# Patient Record
Sex: MALE | Race: WHITE | NOT HISPANIC OR LATINO | Employment: FULL TIME | URBAN - NONMETROPOLITAN AREA
[De-identification: names, ages, dates, MRNs, and addresses within clinical notes are randomized per-mention and may not be internally consistent; named-entity substitution may affect disease eponyms.]

---

## 2023-12-14 ENCOUNTER — APPOINTMENT (OUTPATIENT)
Dept: RADIOLOGY | Facility: HOSPITAL | Age: 68
End: 2023-12-14
Payer: MEDICARE

## 2023-12-14 ENCOUNTER — APPOINTMENT (OUTPATIENT)
Dept: CARDIOLOGY | Facility: HOSPITAL | Age: 68
End: 2023-12-14
Payer: MEDICARE

## 2023-12-14 ENCOUNTER — HOSPITAL ENCOUNTER (OUTPATIENT)
Facility: HOSPITAL | Age: 68
Setting detail: OBSERVATION
LOS: 1 days | Discharge: HOME | End: 2023-12-16
Attending: FAMILY MEDICINE | Admitting: NURSE PRACTITIONER
Payer: MEDICARE

## 2023-12-14 ENCOUNTER — OFFICE VISIT (OUTPATIENT)
Dept: PRIMARY CARE | Facility: CLINIC | Age: 68
End: 2023-12-14
Payer: MEDICARE

## 2023-12-14 VITALS
BODY MASS INDEX: 19.95 KG/M2 | WEIGHT: 127.4 LBS | DIASTOLIC BLOOD PRESSURE: 64 MMHG | OXYGEN SATURATION: 99 % | HEART RATE: 73 BPM | SYSTOLIC BLOOD PRESSURE: 144 MMHG

## 2023-12-14 DIAGNOSIS — I63.9 CEREBROVASCULAR ACCIDENT (CVA), UNSPECIFIED MECHANISM (MULTI): Primary | ICD-10-CM

## 2023-12-14 DIAGNOSIS — K31.819 AVM (ARTERIOVENOUS MALFORMATION) OF DUODENUM, ACQUIRED: ICD-10-CM

## 2023-12-14 DIAGNOSIS — G45.9 TIA (TRANSIENT ISCHEMIC ATTACK): Primary | ICD-10-CM

## 2023-12-14 DIAGNOSIS — N17.0 ACUTE KIDNEY INJURY (AKI) WITH ACUTE TUBULAR NECROSIS (ATN) (CMS-HCC): ICD-10-CM

## 2023-12-14 DIAGNOSIS — J44.9 CHRONIC OBSTRUCTIVE PULMONARY DISEASE, UNSPECIFIED COPD TYPE (MULTI): ICD-10-CM

## 2023-12-14 DIAGNOSIS — E78.5 HYPERLIPIDEMIA, UNSPECIFIED HYPERLIPIDEMIA TYPE: ICD-10-CM

## 2023-12-14 DIAGNOSIS — I25.10 CORONARY ARTERY DISEASE INVOLVING NATIVE CORONARY ARTERY OF NATIVE HEART WITHOUT ANGINA PECTORIS: ICD-10-CM

## 2023-12-14 DIAGNOSIS — I63.9 ACUTE EMBOLIC STROKE (MULTI): ICD-10-CM

## 2023-12-14 DIAGNOSIS — N18.30 STAGE 3 CHRONIC KIDNEY DISEASE, UNSPECIFIED WHETHER STAGE 3A OR 3B CKD (MULTI): ICD-10-CM

## 2023-12-14 DIAGNOSIS — I10 BENIGN HYPERTENSION: ICD-10-CM

## 2023-12-14 DIAGNOSIS — R00.1 BRADYCARDIA: ICD-10-CM

## 2023-12-14 DIAGNOSIS — E03.9 HYPOTHYROIDISM, UNSPECIFIED TYPE: ICD-10-CM

## 2023-12-14 DIAGNOSIS — E78.00 HYPERCHOLESTEROLEMIA: ICD-10-CM

## 2023-12-14 DIAGNOSIS — E87.1 HYPONATREMIA: ICD-10-CM

## 2023-12-14 DIAGNOSIS — F17.200 NICOTINE DEPENDENCE, UNCOMPLICATED, UNSPECIFIED NICOTINE PRODUCT TYPE: ICD-10-CM

## 2023-12-14 DIAGNOSIS — E46 PROTEIN-CALORIE MALNUTRITION, UNSPECIFIED SEVERITY (MULTI): ICD-10-CM

## 2023-12-14 PROBLEM — G45.3 AMAUROSIS FUGAX OF RIGHT EYE: Status: ACTIVE | Noted: 2023-12-14

## 2023-12-14 PROBLEM — I65.29 CAROTID ARTERY STENOSIS: Status: ACTIVE | Noted: 2023-12-14

## 2023-12-14 PROBLEM — N17.9 RENAL FAILURE, ACUTE (CMS-HCC): Status: ACTIVE | Noted: 2023-12-14

## 2023-12-14 PROBLEM — R06.09 DYSPNEA ON EXERTION: Status: ACTIVE | Noted: 2023-12-14

## 2023-12-14 PROBLEM — K55.20 AVM (ARTERIOVENOUS MALFORMATION) OF COLON: Status: ACTIVE | Noted: 2023-12-14

## 2023-12-14 PROBLEM — D50.9 IRON DEFICIENCY ANEMIA: Status: ACTIVE | Noted: 2023-12-14

## 2023-12-14 PROBLEM — R53.83 FATIGUE: Status: ACTIVE | Noted: 2023-12-14

## 2023-12-14 PROBLEM — E55.9 VITAMIN D DEFICIENCY: Status: ACTIVE | Noted: 2023-12-14

## 2023-12-14 PROBLEM — U07.1 COVID-19 VIRUS INFECTION: Status: ACTIVE | Noted: 2023-12-14

## 2023-12-14 PROBLEM — I65.23 BILATERAL CAROTID ARTERY STENOSIS: Status: ACTIVE | Noted: 2023-12-14

## 2023-12-14 PROBLEM — D64.9 ANEMIA: Status: ACTIVE | Noted: 2023-12-14

## 2023-12-14 PROBLEM — R63.4 ABNORMAL WEIGHT LOSS: Status: ACTIVE | Noted: 2023-12-14

## 2023-12-14 PROBLEM — K21.9 GASTROESOPHAGEAL REFLUX DISEASE: Status: ACTIVE | Noted: 2023-12-14

## 2023-12-14 LAB
ALBUMIN SERPL BCP-MCNC: 4.2 G/DL (ref 3.4–5)
ALP SERPL-CCNC: 41 U/L (ref 33–136)
ALT SERPL W P-5'-P-CCNC: 15 U/L (ref 10–52)
ANION GAP SERPL CALC-SCNC: 14 MMOL/L (ref 10–20)
APPEARANCE UR: CLEAR
AST SERPL W P-5'-P-CCNC: 26 U/L (ref 9–39)
ATRIAL RATE: 55 BPM
ATRIAL RATE: 56 BPM
BASOPHILS # BLD AUTO: 0.04 X10*3/UL (ref 0–0.1)
BASOPHILS NFR BLD AUTO: 0.7 %
BILIRUB SERPL-MCNC: 0.3 MG/DL (ref 0–1.2)
BILIRUB UR STRIP.AUTO-MCNC: NEGATIVE MG/DL
BUN SERPL-MCNC: 30 MG/DL (ref 6–23)
CALCIUM SERPL-MCNC: 9.2 MG/DL (ref 8.6–10.3)
CARDIAC TROPONIN I PNL SERPL HS: 12 NG/L (ref 0–20)
CARDIAC TROPONIN I PNL SERPL HS: 13 NG/L (ref 0–20)
CHLORIDE SERPL-SCNC: 93 MMOL/L (ref 98–107)
CO2 SERPL-SCNC: 23 MMOL/L (ref 21–32)
COLOR UR: YELLOW
CREAT SERPL-MCNC: 1.85 MG/DL (ref 0.5–1.3)
EOSINOPHIL # BLD AUTO: 0.09 X10*3/UL (ref 0–0.7)
EOSINOPHIL NFR BLD AUTO: 1.6 %
ERYTHROCYTE [DISTWIDTH] IN BLOOD BY AUTOMATED COUNT: 16.8 % (ref 11.5–14.5)
GFR SERPL CREATININE-BSD FRML MDRD: 39 ML/MIN/1.73M*2
GLUCOSE SERPL-MCNC: 165 MG/DL (ref 74–99)
GLUCOSE UR STRIP.AUTO-MCNC: NEGATIVE MG/DL
HCT VFR BLD AUTO: 34 % (ref 41–52)
HGB BLD-MCNC: 10.8 G/DL (ref 13.5–17.5)
HOLD SPECIMEN: NORMAL
HYALINE CASTS #/AREA URNS AUTO: ABNORMAL /LPF
IMM GRANULOCYTES # BLD AUTO: 0.02 X10*3/UL (ref 0–0.7)
IMM GRANULOCYTES NFR BLD AUTO: 0.3 % (ref 0–0.9)
KETONES UR STRIP.AUTO-MCNC: NEGATIVE MG/DL
LACTATE SERPL-SCNC: 1.7 MMOL/L (ref 0.4–2)
LEUKOCYTE ESTERASE UR QL STRIP.AUTO: NEGATIVE
LYMPHOCYTES # BLD AUTO: 0.95 X10*3/UL (ref 1.2–4.8)
LYMPHOCYTES NFR BLD AUTO: 16.4 %
MAGNESIUM SERPL-MCNC: 1.7 MG/DL (ref 1.6–2.4)
MCH RBC QN AUTO: 26.3 PG (ref 26–34)
MCHC RBC AUTO-ENTMCNC: 31.8 G/DL (ref 32–36)
MCV RBC AUTO: 83 FL (ref 80–100)
MONOCYTES # BLD AUTO: 0.27 X10*3/UL (ref 0.1–1)
MONOCYTES NFR BLD AUTO: 4.7 %
MUCOUS THREADS #/AREA URNS AUTO: ABNORMAL /LPF
NEUTROPHILS # BLD AUTO: 4.41 X10*3/UL (ref 1.2–7.7)
NEUTROPHILS NFR BLD AUTO: 76.3 %
NITRITE UR QL STRIP.AUTO: NEGATIVE
NRBC BLD-RTO: 0 /100 WBCS (ref 0–0)
P AXIS: 67 DEGREES
P AXIS: 69 DEGREES
P OFFSET: 170 MS
P OFFSET: 172 MS
P ONSET: 110 MS
P ONSET: 116 MS
PH UR STRIP.AUTO: 6 [PH]
PLATELET # BLD AUTO: 323 X10*3/UL (ref 150–450)
POTASSIUM SERPL-SCNC: 4.5 MMOL/L (ref 3.5–5.3)
PR INTERVAL: 208 MS
PR INTERVAL: 216 MS
PROT SERPL-MCNC: 7.8 G/DL (ref 6.4–8.2)
PROT UR STRIP.AUTO-MCNC: ABNORMAL MG/DL
Q ONSET: 218 MS
Q ONSET: 220 MS
QRS COUNT: 9 BEATS
QRS COUNT: 9 BEATS
QRS DURATION: 86 MS
QRS DURATION: 96 MS
QT INTERVAL: 412 MS
QT INTERVAL: 420 MS
QTC CALCULATION(BAZETT): 397 MS
QTC CALCULATION(BAZETT): 401 MS
QTC FREDERICIA: 403 MS
QTC FREDERICIA: 408 MS
R AXIS: 72 DEGREES
R AXIS: 72 DEGREES
RBC # BLD AUTO: 4.11 X10*6/UL (ref 4.5–5.9)
RBC # UR STRIP.AUTO: NEGATIVE /UL
RBC #/AREA URNS AUTO: ABNORMAL /HPF
SODIUM SERPL-SCNC: 125 MMOL/L (ref 136–145)
SP GR UR STRIP.AUTO: 1.01
T AXIS: 46 DEGREES
T AXIS: 59 DEGREES
T OFFSET: 426 MS
T OFFSET: 428 MS
UROBILINOGEN UR STRIP.AUTO-MCNC: <2 MG/DL
VENTRICULAR RATE: 55 BPM
VENTRICULAR RATE: 56 BPM
WBC # BLD AUTO: 5.8 X10*3/UL (ref 4.4–11.3)
WBC #/AREA URNS AUTO: ABNORMAL /HPF

## 2023-12-14 PROCEDURE — G0378 HOSPITAL OBSERVATION PER HR: HCPCS

## 2023-12-14 PROCEDURE — 2500000001 HC RX 250 WO HCPCS SELF ADMINISTERED DRUGS (ALT 637 FOR MEDICARE OP): Performed by: NURSE PRACTITIONER

## 2023-12-14 PROCEDURE — 93005 ELECTROCARDIOGRAM TRACING: CPT

## 2023-12-14 PROCEDURE — 84484 ASSAY OF TROPONIN QUANT: CPT | Performed by: FAMILY MEDICINE

## 2023-12-14 PROCEDURE — 93000 ELECTROCARDIOGRAM COMPLETE: CPT | Performed by: INTERNAL MEDICINE

## 2023-12-14 PROCEDURE — 3078F DIAST BP <80 MM HG: CPT | Performed by: INTERNAL MEDICINE

## 2023-12-14 PROCEDURE — 3077F SYST BP >= 140 MM HG: CPT | Performed by: INTERNAL MEDICINE

## 2023-12-14 PROCEDURE — 96360 HYDRATION IV INFUSION INIT: CPT | Performed by: NURSE PRACTITIONER

## 2023-12-14 PROCEDURE — 80053 COMPREHEN METABOLIC PANEL: CPT | Performed by: FAMILY MEDICINE

## 2023-12-14 PROCEDURE — 83605 ASSAY OF LACTIC ACID: CPT | Performed by: FAMILY MEDICINE

## 2023-12-14 PROCEDURE — 99285 EMERGENCY DEPT VISIT HI MDM: CPT | Performed by: FAMILY MEDICINE

## 2023-12-14 PROCEDURE — 85025 COMPLETE CBC W/AUTO DIFF WBC: CPT | Performed by: FAMILY MEDICINE

## 2023-12-14 PROCEDURE — 99214 OFFICE O/P EST MOD 30 MIN: CPT | Performed by: INTERNAL MEDICINE

## 2023-12-14 PROCEDURE — 70450 CT HEAD/BRAIN W/O DYE: CPT

## 2023-12-14 PROCEDURE — 71046 X-RAY EXAM CHEST 2 VIEWS: CPT | Mod: FY

## 2023-12-14 PROCEDURE — 94762 N-INVAS EAR/PLS OXIMTRY CONT: CPT

## 2023-12-14 PROCEDURE — 2500000004 HC RX 250 GENERAL PHARMACY W/ HCPCS (ALT 636 FOR OP/ED): Performed by: FAMILY MEDICINE

## 2023-12-14 PROCEDURE — 1159F MED LIST DOCD IN RCRD: CPT | Performed by: INTERNAL MEDICINE

## 2023-12-14 PROCEDURE — 1126F AMNT PAIN NOTED NONE PRSNT: CPT | Performed by: INTERNAL MEDICINE

## 2023-12-14 PROCEDURE — 1160F RVW MEDS BY RX/DR IN RCRD: CPT | Performed by: INTERNAL MEDICINE

## 2023-12-14 PROCEDURE — 83735 ASSAY OF MAGNESIUM: CPT | Performed by: FAMILY MEDICINE

## 2023-12-14 PROCEDURE — 81003 URINALYSIS AUTO W/O SCOPE: CPT | Performed by: FAMILY MEDICINE

## 2023-12-14 PROCEDURE — 4004F PT TOBACCO SCREEN RCVD TLK: CPT | Performed by: INTERNAL MEDICINE

## 2023-12-14 PROCEDURE — 96361 HYDRATE IV INFUSION ADD-ON: CPT | Performed by: NURSE PRACTITIONER

## 2023-12-14 PROCEDURE — 71046 X-RAY EXAM CHEST 2 VIEWS: CPT | Performed by: RADIOLOGY

## 2023-12-14 PROCEDURE — 36415 COLL VENOUS BLD VENIPUNCTURE: CPT | Performed by: FAMILY MEDICINE

## 2023-12-14 PROCEDURE — 70450 CT HEAD/BRAIN W/O DYE: CPT | Performed by: RADIOLOGY

## 2023-12-14 RX ORDER — ONDANSETRON HYDROCHLORIDE 2 MG/ML
4 INJECTION, SOLUTION INTRAVENOUS EVERY 8 HOURS PRN
Status: DISCONTINUED | OUTPATIENT
Start: 2023-12-14 | End: 2023-12-16 | Stop reason: HOSPADM

## 2023-12-14 RX ORDER — ACETAMINOPHEN 160 MG/5ML
650 SOLUTION ORAL EVERY 4 HOURS PRN
Status: DISCONTINUED | OUTPATIENT
Start: 2023-12-14 | End: 2023-12-16 | Stop reason: HOSPADM

## 2023-12-14 RX ORDER — ACETAMINOPHEN 325 MG/1
650 TABLET ORAL EVERY 4 HOURS PRN
Status: DISCONTINUED | OUTPATIENT
Start: 2023-12-14 | End: 2023-12-16 | Stop reason: HOSPADM

## 2023-12-14 RX ORDER — PANTOPRAZOLE SODIUM 40 MG/10ML
40 INJECTION, POWDER, LYOPHILIZED, FOR SOLUTION INTRAVENOUS
Status: DISCONTINUED | OUTPATIENT
Start: 2023-12-15 | End: 2023-12-16 | Stop reason: HOSPADM

## 2023-12-14 RX ORDER — LISINOPRIL AND HYDROCHLOROTHIAZIDE 12.5; 2 MG/1; MG/1
1 TABLET ORAL DAILY
Status: DISCONTINUED | OUTPATIENT
Start: 2023-12-14 | End: 2023-12-15

## 2023-12-14 RX ORDER — AMLODIPINE BESYLATE 5 MG/1
5 TABLET ORAL DAILY
COMMUNITY

## 2023-12-14 RX ORDER — AMLODIPINE BESYLATE 5 MG/1
5 TABLET ORAL DAILY
Status: DISCONTINUED | OUTPATIENT
Start: 2023-12-14 | End: 2023-12-16 | Stop reason: HOSPADM

## 2023-12-14 RX ORDER — LISINOPRIL AND HYDROCHLOROTHIAZIDE 12.5; 2 MG/1; MG/1
1 TABLET ORAL DAILY
COMMUNITY

## 2023-12-14 RX ORDER — ENOXAPARIN SODIUM 100 MG/ML
40 INJECTION SUBCUTANEOUS EVERY 24 HOURS
Status: DISCONTINUED | OUTPATIENT
Start: 2023-12-14 | End: 2023-12-14

## 2023-12-14 RX ORDER — ACETAMINOPHEN 650 MG/1
650 SUPPOSITORY RECTAL EVERY 4 HOURS PRN
Status: DISCONTINUED | OUTPATIENT
Start: 2023-12-14 | End: 2023-12-16 | Stop reason: HOSPADM

## 2023-12-14 RX ORDER — ONDANSETRON 4 MG/1
4 TABLET, FILM COATED ORAL EVERY 8 HOURS PRN
Status: DISCONTINUED | OUTPATIENT
Start: 2023-12-14 | End: 2023-12-16 | Stop reason: HOSPADM

## 2023-12-14 RX ORDER — ASPIRIN 81 MG/1
81 TABLET ORAL DAILY
Status: DISCONTINUED | OUTPATIENT
Start: 2023-12-14 | End: 2023-12-16 | Stop reason: HOSPADM

## 2023-12-14 RX ORDER — ATORVASTATIN CALCIUM 40 MG/1
40 TABLET, FILM COATED ORAL NIGHTLY
Status: DISCONTINUED | OUTPATIENT
Start: 2023-12-14 | End: 2023-12-16 | Stop reason: HOSPADM

## 2023-12-14 RX ORDER — PANTOPRAZOLE SODIUM 40 MG/1
40 TABLET, DELAYED RELEASE ORAL
Status: DISCONTINUED | OUTPATIENT
Start: 2023-12-15 | End: 2023-12-16 | Stop reason: HOSPADM

## 2023-12-14 RX ORDER — ENOXAPARIN SODIUM 100 MG/ML
30 INJECTION SUBCUTANEOUS EVERY 24 HOURS
Status: DISCONTINUED | OUTPATIENT
Start: 2023-12-14 | End: 2023-12-16 | Stop reason: HOSPADM

## 2023-12-14 RX ORDER — CETIRIZINE HYDROCHLORIDE 10 MG/1
5 TABLET ORAL DAILY
COMMUNITY

## 2023-12-14 RX ORDER — POLYETHYLENE GLYCOL 3350 17 G/17G
17 POWDER, FOR SOLUTION ORAL DAILY PRN
Status: DISCONTINUED | OUTPATIENT
Start: 2023-12-14 | End: 2023-12-16 | Stop reason: HOSPADM

## 2023-12-14 RX ADMIN — SODIUM CHLORIDE 500 ML: 9 INJECTION, SOLUTION INTRAVENOUS at 14:05

## 2023-12-14 RX ADMIN — ASPIRIN 81 MG: 81 TABLET, COATED ORAL at 21:09

## 2023-12-14 RX ADMIN — ATORVASTATIN CALCIUM 40 MG: 40 TABLET, FILM COATED ORAL at 21:09

## 2023-12-14 SDOH — SOCIAL STABILITY: SOCIAL INSECURITY: ARE THERE ANY APPARENT SIGNS OF INJURIES/BEHAVIORS THAT COULD BE RELATED TO ABUSE/NEGLECT?: NO

## 2023-12-14 SDOH — SOCIAL STABILITY: SOCIAL INSECURITY: WERE YOU ABLE TO COMPLETE ALL THE BEHAVIORAL HEALTH SCREENINGS?: YES

## 2023-12-14 SDOH — SOCIAL STABILITY: SOCIAL INSECURITY: ARE YOU OR HAVE YOU BEEN THREATENED OR ABUSED PHYSICALLY, EMOTIONALLY, OR SEXUALLY BY ANYONE?: NO

## 2023-12-14 SDOH — SOCIAL STABILITY: SOCIAL INSECURITY: HAS ANYONE EVER THREATENED TO HURT YOUR FAMILY OR YOUR PETS?: NO

## 2023-12-14 SDOH — SOCIAL STABILITY: SOCIAL INSECURITY: DO YOU FEEL ANYONE HAS EXPLOITED OR TAKEN ADVANTAGE OF YOU FINANCIALLY OR OF YOUR PERSONAL PROPERTY?: NO

## 2023-12-14 SDOH — SOCIAL STABILITY: SOCIAL INSECURITY: DO YOU FEEL UNSAFE GOING BACK TO THE PLACE WHERE YOU ARE LIVING?: NO

## 2023-12-14 SDOH — SOCIAL STABILITY: SOCIAL INSECURITY: ABUSE: ADULT

## 2023-12-14 SDOH — SOCIAL STABILITY: SOCIAL INSECURITY: HAVE YOU HAD THOUGHTS OF HARMING ANYONE ELSE?: NO

## 2023-12-14 SDOH — SOCIAL STABILITY: SOCIAL INSECURITY: DOES ANYONE TRY TO KEEP YOU FROM HAVING/CONTACTING OTHER FRIENDS OR DOING THINGS OUTSIDE YOUR HOME?: NO

## 2023-12-14 ASSESSMENT — COLUMBIA-SUICIDE SEVERITY RATING SCALE - C-SSRS
1. IN THE PAST MONTH, HAVE YOU WISHED YOU WERE DEAD OR WISHED YOU COULD GO TO SLEEP AND NOT WAKE UP?: NO
2. HAVE YOU ACTUALLY HAD ANY THOUGHTS OF KILLING YOURSELF?: NO
6. HAVE YOU EVER DONE ANYTHING, STARTED TO DO ANYTHING, OR PREPARED TO DO ANYTHING TO END YOUR LIFE?: NO

## 2023-12-14 ASSESSMENT — ACTIVITIES OF DAILY LIVING (ADL)
PATIENT'S MEMORY ADEQUATE TO SAFELY COMPLETE DAILY ACTIVITIES?: YES
FEEDING YOURSELF: INDEPENDENT
DRESSING YOURSELF: INDEPENDENT
HEARING - RIGHT EAR: FUNCTIONAL
GROOMING: INDEPENDENT
TOILETING: INDEPENDENT
HEARING - LEFT EAR: FUNCTIONAL
ADEQUATE_TO_COMPLETE_ADL: YES
LACK_OF_TRANSPORTATION: NO
JUDGMENT_ADEQUATE_SAFELY_COMPLETE_DAILY_ACTIVITIES: YES
WALKS IN HOME: INDEPENDENT
BATHING: INDEPENDENT
ASSISTIVE_DEVICE: EYEGLASSES

## 2023-12-14 ASSESSMENT — PAIN SCALES - GENERAL
PAINLEVEL_OUTOF10: 0 - NO PAIN

## 2023-12-14 ASSESSMENT — LIFESTYLE VARIABLES
AUDIT-C TOTAL SCORE: 6
HOW OFTEN DO YOU HAVE A DRINK CONTAINING ALCOHOL: 4 OR MORE TIMES A WEEK
AUDIT-C TOTAL SCORE: 6
HOW MANY STANDARD DRINKS CONTAINING ALCOHOL DO YOU HAVE ON A TYPICAL DAY: 3 OR 4
SKIP TO QUESTIONS 9-10: 0
HOW OFTEN DO YOU HAVE 6 OR MORE DRINKS ON ONE OCCASION: LESS THAN MONTHLY

## 2023-12-14 ASSESSMENT — COGNITIVE AND FUNCTIONAL STATUS - GENERAL
MOBILITY SCORE: 24
PATIENT BASELINE BEDBOUND: NO
DAILY ACTIVITIY SCORE: 24

## 2023-12-14 ASSESSMENT — PAIN - FUNCTIONAL ASSESSMENT
PAIN_FUNCTIONAL_ASSESSMENT: 0-10
PAIN_FUNCTIONAL_ASSESSMENT: 0-10

## 2023-12-14 ASSESSMENT — PATIENT HEALTH QUESTIONNAIRE - PHQ9
1. LITTLE INTEREST OR PLEASURE IN DOING THINGS: NOT AT ALL
2. FEELING DOWN, DEPRESSED OR HOPELESS: NOT AT ALL
SUM OF ALL RESPONSES TO PHQ9 QUESTIONS 1 & 2: 0

## 2023-12-14 ASSESSMENT — ENCOUNTER SYMPTOMS
NUMBNESS: 1
EXTREMITY WEAKNESS: 1

## 2023-12-14 NOTE — PROGRESS NOTES
Subjective   Patient ID: Denys Denton is a 68 y.o. male who presents for Extremity Weakness (Right side foot, could not lift foot/Right arm and hand-having a hard time writing/Started on Monday 12/11/23 at 7pm/) and Numbness (Right arm ).  Extremity Weakness  Associated symptoms include numbness.   Neuropathy          Same day sick        Last seen 11-22        Patient noticed 12/11/2023 sudden onset right lower extremity and   right   upper extremity weakness and tingling.  Also denied speech difficulties.  He denied any chest pain dyspnea and cough.  Patient with a history of TIAs.     hypothyroidism on rx no side effects     s/p symptomatic anemia 2ry to AVM bleed  no bleeding seen  feels much better  was using a lot of NSAID's for arthritic pains     GERD stable on rx no side effects     CAD stable on rx no side effects     hypertension on rx no side effects     hypercholesterolemia on rx no side effects     h/o strokes 2013     nicotine cessation discussed     diet / exercise rev'd     follow up cardio / Dr Chapin     follow weights    Review of Systems   Musculoskeletal:  Positive for extremity weakness.   Neurological:  Positive for numbness.   All other systems reviewed and are negative.      Objective   /64   Pulse 73   Wt 57.8 kg (127 lb 6.4 oz)   SpO2 99%   BMI 19.95 kg/m²   Lab Results   Component Value Date    WBC 5.8 12/14/2023    HGB 10.8 (L) 12/14/2023    HCT 34.0 (L) 12/14/2023     12/14/2023    CHOL 174 02/21/2022    TRIG 60 02/21/2022    HDL 72.0 02/21/2022    ALT 15 12/14/2023    AST 26 12/14/2023     (L) 12/14/2023    K 4.5 12/14/2023    CL 93 (L) 12/14/2023    CREATININE 1.85 (H) 12/14/2023    BUN 30 (H) 12/14/2023    CO2 23 12/14/2023    TSH 11.71 (H) 08/30/2022    INR 0.9 07/07/2022    HGBA1C 5.4 11/22/2022           Physical Exam  Vitals reviewed.   Constitutional:       Appearance: Normal appearance. He is normal weight.   HENT:      Head: Normocephalic and  atraumatic.      Mouth/Throat:      Pharynx: No posterior oropharyngeal erythema.   Eyes:      General: No scleral icterus.     Conjunctiva/sclera: Conjunctivae normal.      Pupils: Pupils are equal, round, and reactive to light.   Cardiovascular:      Rate and Rhythm: Normal rate and regular rhythm.      Heart sounds: Normal heart sounds.   Pulmonary:      Effort: No respiratory distress.      Breath sounds: No wheezing.   Abdominal:      General: Abdomen is flat. Bowel sounds are normal. There is no distension.      Palpations: Abdomen is soft. There is no mass.      Tenderness: There is no abdominal tenderness. There is no rebound.   Musculoskeletal:         General: Normal range of motion.      Cervical back: Normal range of motion and neck supple.   Skin:     General: Skin is warm and dry.   Neurological:      General: No focal deficit present.      Mental Status: He is alert and oriented to person, place, and time. Mental status is at baseline.      Motor: Weakness present.      Gait: Gait abnormal.      Comments: RUE weak   Normal speech  DTR's normal / =   Psychiatric:         Mood and Affect: Mood normal.         Behavior: Behavior normal.         Thought Content: Thought content normal.         Judgment: Judgment normal.         Problem List Items Addressed This Visit             ICD-10-CM       Cardiac and Vasculature    Benign hypertension I10    CAD (coronary artery disease) I25.10    Relevant Medications    amLODIPine (Norvasc) 5 mg tablet    Hypercholesterolemia E78.00    Relevant Orders    ECG 12 Lead (Completed)       Endocrine/Metabolic    Hypothyroidism E03.9    Protein-calorie malnutrition, unspecified severity (CMS/HCC) E46       Gastrointestinal and Abdominal    AVM (arteriovenous malformation) of duodenum, acquired K31.819       Genitourinary and Reproductive    Stage 3 chronic kidney disease, unspecified whether stage 3a or 3b CKD (CMS/HCC) N18.30       Pulmonary and Pneumonias    Chronic  obstructive pulmonary disease, unspecified COPD type (CMS/[facility]) J44.9     Other Visit Diagnoses         Codes    Cerebrovascular accident (CVA), unspecified mechanism (CMS/[facility])    -  Primary I63.9    Nicotine dependence, uncomplicated, unspecified nicotine product type     F17.200          Assessment/Plan       Same day sick        Last seen 11-22        Patient noticed 12/11/2023 sudden onset right lower extremity and   right   upper extremity weakness and tingling.  Also denied speech difficulties.  He denied any chest pain dyspnea and cough.  Patient with a history of TIAs / strokes  EKG ok  To ER ASAP  D/W ER team need for admission for further workup     hypothyroidism on rx no side effects     s/p symptomatic anemia 2ry to AVM bleed  no bleeding seen  feels much better  was using a lot of NSAID's for arthritic pains     GERD stable on rx no side effects     CAD stable on rx no side effects     hypertension on rx no side effects     hypercholesterolemia on rx no side effects     h/o strokes 2013     nicotine cessation discussed     diet / exercise rev'd     follow up cardio / Dr Chapin     follow weights    Follow up pending

## 2023-12-14 NOTE — ED PROVIDER NOTES
"HPI   Chief Complaint   Patient presents with    Weakness, Gen     Right sided weakness that began on Monday; history of \"a couple of strokes,\" reports calling Dr. Queenie Garza this morning who recommended ED visit.       HPI  This 68-year-old male patient came to the emergency room with complaint of onset of weakness in right arm or right leg on Monday, 72 hours before reported emergency room patient stated that she did not fully recuperate but  he is able to walk better still has some dragging of the leg and for the stated that he has dexterity problem weak as he cannot write even though he has good handgrip and strength has improved in the arm too.  He denies loss of consciousness blackout or pass out chest pain or short of breath abdominal pain vomiting or diarrhea.  Denies prior history of stroke recent stroke on Monday.  He denies any headache and dizziness lightheadedness.  Patient not using a blood thinner.  Denies trauma fall injury.  He does smoke.  He denies history of close problem admitted history of hypertension.       PHYSICAL EXAM    CONSTITUTIONAL: Patient is awake alert pleasant cooperative.  She is drooping or drooling no stridor.  Slight weakness in right hand when he tried to read and write right since but he has good handgrip and strength is 5/5 in the right and right leg when he walks he has slightNo for asymmetrical mood but strength is 5/5 in right arm and right leg.  Actually both arms both legs.  No facial drooping or drooling no stridor neck was supple awake alert oriented x 3.    HENMT: The airway was intact. There was no ear or nose discharge. No drooling or stridor. Neck was supple. Talking and breathing comfortably.  EYES: Clear bilaterally, pupils equal, round and reactive to light. CARDIOVASCULAR: Regular rate and rhythm. No friction rub or murmur good peripheral pulses no peripheral edema. Nontender Homans sign negative. RESPIRATORY: Patient was breathing comfortably. No " tachypnea no respiratory distress.  On auscultation he has diminished breath sounds crackles in the lung bases.  However has good skin perfusion. GASTROINTESTINAL: Abdomen soft positive bowel sounds noted right lower quadrant tenderness no guarding, rebound surgery no CVA tenderness 20 no pulsatile mass.   GENITOURINARY:  No costovertebral angle tenderness. MUSCULOSKELETAL: Head was normocephalic atraumatic cervical thoracic lumbar spine nontender.  Chest was nontender  Both upper extremity good motion nontender intact distal pulse intact sensation. NEUROLOGICAL: No facial drooping or drooling symmetrical strength both upper lower extremity strength 5/5 intact sensation.  When he walks he think he has slight dragging of leg on the right.  Dorsiflexion plantar foot and toes.  He also complains of dexterity problems and unable to write and seems to drop things but handgrip is symmetrical bilaterally.      SKIN: Skin normal color for race, warm, dry and intact. No evidence of trauma or lesions. PSYCHIATRIC: Awake alert and without acute distress. No obvious depression, no suicidal thoughts or ideation. Appropriate mood. Talking and normal tone. HEME/LYMPH: No adenopathy or splenomegaly.        CRITICAL CARE: Patient required 15 minutes of critical care in the ER    VITAL SIGNS:            DISPOSITION                      No data recorded                Patient History   Past Medical History:   Diagnosis Date    Personal history of other diseases of the circulatory system     History of hypertension    Personal history of other specified conditions 08/30/2022    History of chest pain    Personal history of transient ischemic attack (TIA), and cerebral infarction without residual deficits 05/05/2021    History of cerebrovascular accident    Pure hypercholesterolemia, unspecified 08/30/2022    Hypercholesterolemia     Past Surgical History:   Procedure Laterality Date    COLOSTOMY  05/08/2014    Colostomy    KNEE SURGERY   05/08/2014    Knee Surgery    OTHER SURGICAL HISTORY  05/05/2021    Carpal tunnel surgery    OTHER SURGICAL HISTORY  05/05/2021    Sinus surgery    OTHER SURGICAL HISTORY  06/23/2021    Complete colonoscopy    OTHER SURGICAL HISTORY  06/17/2014    Carotid Artery Catheterization     No family history on file.  Social History     Tobacco Use    Smoking status: Every Day     Types: Cigarettes    Smokeless tobacco: Never   Substance Use Topics    Alcohol use: Not on file    Drug use: Not on file       Physical Exam   ED Triage Vitals [12/14/23 1237]   Temp Heart Rate Resp BP   36.3 °C (97.4 °F) 65 15 174/78      SpO2 Temp Source Heart Rate Source Patient Position   100 % Temporal -- --      BP Location FiO2 (%)     -- --       Physical Exam    ED Course & MDM   Diagnoses as of 01/10/24 0633   Acute embolic stroke (CMS/HCC)   Hyponatremia   Acute kidney injury ([facility]) with acute tubular necrosis (ATN) (CMS/HCC)   Bradycardia   EKG obtained at 1421 hrs. showed sinus bradycardia rate of 56.  Tall T waves, no ST-T wave elevation no STEMI.  When compared with the prior EKG on July 7, 2023 no new changes noted.  No STEMI.  I read this EKG.    Second EKG done at 1550 hrs. again showed sinus bradycardia with first-degree AV block on this EKG, tall T waves.  No ST-T evaluation.  No STEMI.  Abnormal EKG CO at this EKG.    Medical Decision Making  This 68-year-old male patient came to the emergency room with complaint of onset of weakness in right arm or right leg on Monday, 72 hours before he reported emergency room.  Patient stated that she did not fully recuperate but  he is able to walk better still has some dragging of the leg and for the stated that he has dexterity problem weak as he cannot write even though he has good handgrip and strength has improved in the arm too.  He denies loss of consciousness blackout or pass out chest pain or short of breath abdominal pain vomiting or diarrhea.  Denies prior history of stroke  recent stroke on Monday.  He denies any headache and dizziness lightheadedness.  Patient not using a blood thinner.  Denies trauma fall injury.  He does smoke.  He denies history of close problem admitted history of hypertension.    Upon examination patient is awake and alert oriented x 3.  No facial drooping or drooling no stridor.  No facial asymmetry.  Neck is supple lungs are clear heart regular rate and rhythm lungs auscultated abdomen soft and nontender no pulsatile mass noted.  No facial drooping or drooling symmetrical strength both upper lower extremity strength 5/5 intact sensation.  When he walks he think he has slight dragging of leg on the right.  Dorsiflexion plantar foot and toes.  He also complains of dexterity problems and unable to write and seems to drop things but handgrip is symmetrical bilaterally.    Patient reported emergency room almost 4 days after onset of symptoms he is having recovery of some of the stroke symptoms in the right arm right leg weakness.  His CAT scan however showed encephalomalacia but no definite acute stroke see CT report.    Case discussed with Dr. Blanquita MELÉNDEZ MD's stroke attending at [facility] agree patient does not qualify for thrombolysis thrombolytic treatment she advised patient to be admitted locally and they are doing virtual consult after patient workup done for stroke as an inpatient.  Patient was given aspirin and subsequently admitted to the floor.  Patient labs did show hyponatremia rest of the chemistry essentially unremarkable except BUN was 30 and creatinine 1.65.  Patient troponin negative.  Chest x-ray was negative.  EKG showed bradycardia, no ST-T wave elevation.  No STEMI.  Patient required 15 minutes of critical care in the ER:       Procedure  Procedures     Fady Yeung MD  01/10/24 [id number]       Fady Yeung MD  01/10/24 [id number]

## 2023-12-15 ENCOUNTER — APPOINTMENT (OUTPATIENT)
Dept: CARDIOLOGY | Facility: HOSPITAL | Age: 68
End: 2023-12-15
Payer: MEDICARE

## 2023-12-15 ENCOUNTER — APPOINTMENT (OUTPATIENT)
Dept: RADIOLOGY | Facility: HOSPITAL | Age: 68
End: 2023-12-15
Payer: MEDICARE

## 2023-12-15 LAB
ALBUMIN SERPL BCP-MCNC: 4 G/DL (ref 3.4–5)
ALP SERPL-CCNC: 33 U/L (ref 33–136)
ALT SERPL W P-5'-P-CCNC: 10 U/L (ref 10–52)
ANION GAP SERPL CALC-SCNC: 13 MMOL/L (ref 10–20)
AORTIC VALVE PEAK VELOCITY: 1.34
AST SERPL W P-5'-P-CCNC: 19 U/L (ref 9–39)
AV PEAK GRADIENT: 7.2
AVA (PEAK VEL): 2.79
BILIRUB DIRECT SERPL-MCNC: 0 MG/DL (ref 0–0.3)
BILIRUB SERPL-MCNC: 0.3 MG/DL (ref 0–1.2)
BNP SERPL-MCNC: 94 PG/ML (ref 0–99)
BUN SERPL-MCNC: 31 MG/DL (ref 6–23)
CALCIUM SERPL-MCNC: 9.1 MG/DL (ref 8.6–10.3)
CHLORIDE SERPL-SCNC: 100 MMOL/L (ref 98–107)
CHOLEST SERPL-MCNC: 156 MG/DL (ref 0–199)
CHOLESTEROL/HDL RATIO: 2.7
CO2 SERPL-SCNC: 21 MMOL/L (ref 21–32)
CREAT SERPL-MCNC: 1.04 MG/DL (ref 0.5–1.3)
EJECTION FRACTION APICAL 4 CHAMBER: 70.9
EJECTION FRACTION: 72
ERYTHROCYTE [DISTWIDTH] IN BLOOD BY AUTOMATED COUNT: 16.9 % (ref 11.5–14.5)
EST. AVERAGE GLUCOSE BLD GHB EST-MCNC: 108 MG/DL
GFR SERPL CREATININE-BSD FRML MDRD: 77 ML/MIN/1.73M*2
GLUCOSE SERPL-MCNC: 98 MG/DL (ref 74–99)
HBA1C MFR BLD: 5.4 %
HCT VFR BLD AUTO: 33 % (ref 41–52)
HDLC SERPL-MCNC: 58 MG/DL
HGB BLD-MCNC: 10.4 G/DL (ref 13.5–17.5)
LDLC SERPL CALC-MCNC: 88 MG/DL
LEFT ATRIUM VOLUME AREA LENGTH INDEX BSA: 28.2
LEFT VENTRICLE INTERNAL DIMENSION DIASTOLE: 4.27 (ref 3.5–6)
LEFT VENTRICULAR OUTFLOW TRACT DIAMETER: 2.1
MCH RBC QN AUTO: 26.1 PG (ref 26–34)
MCHC RBC AUTO-ENTMCNC: 31.5 G/DL (ref 32–36)
MCV RBC AUTO: 83 FL (ref 80–100)
MITRAL VALVE E/A RATIO: 0.77
MITRAL VALVE E/E' RATIO: 7.39
NON HDL CHOLESTEROL: 98 MG/DL (ref 0–149)
NRBC BLD-RTO: 0 /100 WBCS (ref 0–0)
PLATELET # BLD AUTO: 287 X10*3/UL (ref 150–450)
POTASSIUM SERPL-SCNC: 4.1 MMOL/L (ref 3.5–5.3)
PROT SERPL-MCNC: 7.1 G/DL (ref 6.4–8.2)
RBC # BLD AUTO: 3.98 X10*6/UL (ref 4.5–5.9)
RIGHT VENTRICLE FREE WALL PEAK S': 12.8
RIGHT VENTRICLE PEAK SYSTOLIC PRESSURE: 21.8
SODIUM SERPL-SCNC: 130 MMOL/L (ref 136–145)
TRICUSPID ANNULAR PLANE SYSTOLIC EXCURSION: 1.9
TRIGL SERPL-MCNC: 51 MG/DL (ref 0–149)
VLDL: 10 MG/DL (ref 0–40)
WBC # BLD AUTO: 5.2 X10*3/UL (ref 4.4–11.3)

## 2023-12-15 PROCEDURE — 36415 COLL VENOUS BLD VENIPUNCTURE: CPT | Performed by: NURSE PRACTITIONER

## 2023-12-15 PROCEDURE — 83036 HEMOGLOBIN GLYCOSYLATED A1C: CPT | Mod: GENLAB | Performed by: NURSE PRACTITIONER

## 2023-12-15 PROCEDURE — 80053 COMPREHEN METABOLIC PANEL: CPT

## 2023-12-15 PROCEDURE — 70547 MR ANGIOGRAPHY NECK W/O DYE: CPT

## 2023-12-15 PROCEDURE — 80061 LIPID PANEL: CPT | Performed by: NURSE PRACTITIONER

## 2023-12-15 PROCEDURE — 96361 HYDRATE IV INFUSION ADD-ON: CPT | Performed by: NURSE PRACTITIONER

## 2023-12-15 PROCEDURE — 70551 MRI BRAIN STEM W/O DYE: CPT

## 2023-12-15 PROCEDURE — 99222 1ST HOSP IP/OBS MODERATE 55: CPT | Performed by: NURSE PRACTITIONER

## 2023-12-15 PROCEDURE — 99233 SBSQ HOSP IP/OBS HIGH 50: CPT | Performed by: PSYCHIATRY & NEUROLOGY

## 2023-12-15 PROCEDURE — 85027 COMPLETE CBC AUTOMATED: CPT | Performed by: NURSE PRACTITIONER

## 2023-12-15 PROCEDURE — 83880 ASSAY OF NATRIURETIC PEPTIDE: CPT | Performed by: NURSE PRACTITIONER

## 2023-12-15 PROCEDURE — 36415 COLL VENOUS BLD VENIPUNCTURE: CPT

## 2023-12-15 PROCEDURE — 70551 MRI BRAIN STEM W/O DYE: CPT | Performed by: RADIOLOGY

## 2023-12-15 PROCEDURE — 93306 TTE W/DOPPLER COMPLETE: CPT

## 2023-12-15 PROCEDURE — G0378 HOSPITAL OBSERVATION PER HR: HCPCS

## 2023-12-15 PROCEDURE — 82248 BILIRUBIN DIRECT: CPT | Performed by: NURSE PRACTITIONER

## 2023-12-15 PROCEDURE — 2500000001 HC RX 250 WO HCPCS SELF ADMINISTERED DRUGS (ALT 637 FOR MEDICARE OP)

## 2023-12-15 PROCEDURE — 70547 MR ANGIOGRAPHY NECK W/O DYE: CPT | Performed by: RADIOLOGY

## 2023-12-15 PROCEDURE — 70544 MR ANGIOGRAPHY HEAD W/O DYE: CPT | Performed by: RADIOLOGY

## 2023-12-15 PROCEDURE — 70544 MR ANGIOGRAPHY HEAD W/O DYE: CPT | Mod: 59

## 2023-12-15 PROCEDURE — 2500000004 HC RX 250 GENERAL PHARMACY W/ HCPCS (ALT 636 FOR OP/ED): Mod: MUE

## 2023-12-15 RX ORDER — LISINOPRIL 20 MG/1
20 TABLET ORAL DAILY
Status: DISCONTINUED | OUTPATIENT
Start: 2023-12-15 | End: 2023-12-16 | Stop reason: HOSPADM

## 2023-12-15 RX ORDER — HYDROCHLOROTHIAZIDE 12.5 MG/1
12.5 TABLET ORAL DAILY
Status: DISCONTINUED | OUTPATIENT
Start: 2023-12-15 | End: 2023-12-16 | Stop reason: HOSPADM

## 2023-12-15 RX ORDER — SODIUM CHLORIDE 9 MG/ML
75 INJECTION, SOLUTION INTRAVENOUS CONTINUOUS
Status: DISCONTINUED | OUTPATIENT
Start: 2023-12-15 | End: 2023-12-16 | Stop reason: HOSPADM

## 2023-12-15 RX ADMIN — SODIUM CHLORIDE 75 ML/HR: 9 INJECTION, SOLUTION INTRAVENOUS at 13:40

## 2023-12-15 RX ADMIN — LISINOPRIL 20 MG: 20 TABLET ORAL at 08:36

## 2023-12-15 RX ADMIN — HYDROCHLOROTHIAZIDE 12.5 MG: 12.5 TABLET ORAL at 08:36

## 2023-12-15 RX ADMIN — AMLODIPINE BESYLATE 5 MG: 5 TABLET ORAL at 08:36

## 2023-12-15 RX ADMIN — PANTOPRAZOLE SODIUM 40 MG: 40 TABLET, DELAYED RELEASE ORAL at 08:36

## 2023-12-15 RX ADMIN — ASPIRIN 81 MG: 81 TABLET, COATED ORAL at 08:36

## 2023-12-15 RX ADMIN — ATORVASTATIN CALCIUM 40 MG: 40 TABLET, FILM COATED ORAL at 21:54

## 2023-12-15 SDOH — ECONOMIC STABILITY: HOUSING INSECURITY: IN THE LAST 12 MONTHS, HOW MANY PLACES HAVE YOU LIVED?: 1

## 2023-12-15 SDOH — SOCIAL STABILITY: SOCIAL NETWORK: ARE YOU MARRIED, WIDOWED, DIVORCED, SEPARATED, NEVER MARRIED, OR LIVING WITH A PARTNER?: MARRIED

## 2023-12-15 SDOH — ECONOMIC STABILITY: HOUSING INSECURITY
IN THE LAST 12 MONTHS, WAS THERE A TIME WHEN YOU DID NOT HAVE A STEADY PLACE TO SLEEP OR SLEPT IN A SHELTER (INCLUDING NOW)?: NO

## 2023-12-15 SDOH — SOCIAL STABILITY: SOCIAL INSECURITY
WITHIN THE LAST YEAR, HAVE TO BEEN RAPED OR FORCED TO HAVE ANY KIND OF SEXUAL ACTIVITY BY YOUR PARTNER OR EX-PARTNER?: NO

## 2023-12-15 SDOH — ECONOMIC STABILITY: FOOD INSECURITY: WITHIN THE PAST 12 MONTHS, YOU WORRIED THAT YOUR FOOD WOULD RUN OUT BEFORE YOU GOT MONEY TO BUY MORE.: NEVER TRUE

## 2023-12-15 SDOH — SOCIAL STABILITY: SOCIAL INSECURITY: WITHIN THE LAST YEAR, HAVE YOU BEEN HUMILIATED OR EMOTIONALLY ABUSED IN OTHER WAYS BY YOUR PARTNER OR EX-PARTNER?: NO

## 2023-12-15 SDOH — ECONOMIC STABILITY: FOOD INSECURITY: WITHIN THE PAST 12 MONTHS, THE FOOD YOU BOUGHT JUST DIDN'T LAST AND YOU DIDN'T HAVE MONEY TO GET MORE.: NEVER TRUE

## 2023-12-15 SDOH — HEALTH STABILITY: MENTAL HEALTH
STRESS IS WHEN SOMEONE FEELS TENSE, NERVOUS, ANXIOUS, OR CAN'T SLEEP AT NIGHT BECAUSE THEIR MIND IS TROUBLED. HOW STRESSED ARE YOU?: NOT AT ALL

## 2023-12-15 SDOH — SOCIAL STABILITY: SOCIAL INSECURITY
WITHIN THE LAST YEAR, HAVE YOU BEEN KICKED, HIT, SLAPPED, OR OTHERWISE PHYSICALLY HURT BY YOUR PARTNER OR EX-PARTNER?: NO

## 2023-12-15 SDOH — SOCIAL STABILITY: SOCIAL INSECURITY: WITHIN THE LAST YEAR, HAVE YOU BEEN AFRAID OF YOUR PARTNER OR EX-PARTNER?: NO

## 2023-12-15 SDOH — ECONOMIC STABILITY: INCOME INSECURITY: IN THE LAST 12 MONTHS, WAS THERE A TIME WHEN YOU WERE NOT ABLE TO PAY THE MORTGAGE OR RENT ON TIME?: NO

## 2023-12-15 SDOH — ECONOMIC STABILITY: TRANSPORTATION INSECURITY
IN THE PAST 12 MONTHS, HAS THE LACK OF TRANSPORTATION KEPT YOU FROM MEDICAL APPOINTMENTS OR FROM GETTING MEDICATIONS?: NO

## 2023-12-15 SDOH — ECONOMIC STABILITY: INCOME INSECURITY: IN THE PAST 12 MONTHS, HAS THE ELECTRIC, GAS, OIL, OR WATER COMPANY THREATENED TO SHUT OFF SERVICE IN YOUR HOME?: NO

## 2023-12-15 SDOH — ECONOMIC STABILITY: TRANSPORTATION INSECURITY
IN THE PAST 12 MONTHS, HAS LACK OF TRANSPORTATION KEPT YOU FROM MEETINGS, WORK, OR FROM GETTING THINGS NEEDED FOR DAILY LIVING?: NO

## 2023-12-15 SDOH — ECONOMIC STABILITY: INCOME INSECURITY: HOW HARD IS IT FOR YOU TO PAY FOR THE VERY BASICS LIKE FOOD, HOUSING, MEDICAL CARE, AND HEATING?: NOT HARD AT ALL

## 2023-12-15 ASSESSMENT — PAIN - FUNCTIONAL ASSESSMENT
PAIN_FUNCTIONAL_ASSESSMENT: 0-10
PAIN_FUNCTIONAL_ASSESSMENT: 0-10

## 2023-12-15 ASSESSMENT — COGNITIVE AND FUNCTIONAL STATUS - GENERAL
MOBILITY SCORE: 24
DAILY ACTIVITIY SCORE: 24

## 2023-12-15 ASSESSMENT — PAIN SCALES - GENERAL
PAINLEVEL_OUTOF10: 0 - NO PAIN

## 2023-12-15 NOTE — PROGRESS NOTES
Denys Denton is a 68 y.o. male on day 1 of admission presenting with TIA (transient ischemic attack).      Subjective   Pt assessed at bedside. States he suddenly developed right sided weakness on Monday evening. He said it improved slightly but he slept most of the day Tuesday. He was still experiencing some weakness in his right arm so he called his PCP on Thursday and decided to come into the ED for eval on Thursday night. Pt states that he does not take ASA or plavix because they made him bleed. Pt also states that he was on a cholesterol medication years ago with his prior CVA but stopped taking it because his cholesterol is fine.        Objective     Last Recorded Vitals  /83 (BP Location: Right arm, Patient Position: Sitting)   Pulse 56   Temp 36.7 °C (98.1 °F) (Temporal)   Resp 18   Wt 55.8 kg (123 lb 0.3 oz)   SpO2 96%   Intake/Output last 3 Shifts:    Intake/Output Summary (Last 24 hours) at 12/15/2023 0962  Last data filed at 12/15/2023 0924  Gross per 24 hour   Intake 740 ml   Output --   Net 740 ml       Admission Weight  Weight: 57.6 kg (127 lb) (12/14/23 1237)    Daily Weight  12/14/23 : 55.8 kg (123 lb 0.3 oz)    Image Results  ECG 12 Lead  NSR 62  ECG 12 lead  Sinus bradycardia  Otherwise normal ECG  When compared with ECG of 07-JUL-2022 04:27,  No significant change was found  See ED provider note for full interpretation and clinical correlation  Confirmed by Anjelica Earl ([id number]) on 12/14/2023 8:55:11 PM  ECG 12 lead  Sinus bradycardia with 1st degree AV block  Otherwise normal ECG  When compared with ECG of 14-DEC-2023 14:21, (unconfirmed)  No significant change was found  See ED provider note for full interpretation and clinical correlation  Confirmed by Anjelica Earl ([id number]) on 12/14/2023 8:54:25 PM  XR chest 2 views  Narrative: Interpreted By:  Gopal Shaw,   STUDY:  XR CHEST 2 VIEWS;  12/14/2023 2:45 pm      INDICATION:  Signs/Symptoms:VCA SX.       COMPARISON:  07/07/2022      ACCESSION NUMBER(S):  [id number]      ORDERING CLINICIAN:  ADWOA ANSARI      FINDINGS:  The lungs are clear without apparent pleural effusion. Unchanged  appearance of the cardiomediastinal silhouette. Aortic  atherosclerosis. No pulmonary vascular congestion. Thoracic  degenerative changes are present.      Impression: No active disease in the chest.          Signed by: Gopal Shaw 12/14/2023 3:07 PM  Dictation workstation:   [id number]  CT head wo IV contrast  Narrative: Interpreted By:  Igor Cabrera,   STUDY:  CT HEAD WO IV CONTRAST; 12/14/2023 2:36 pm      INDICATION:  Stroke syndrome with right-sided weakness started on Monday with some  interval improvement..      COMPARISON:  None.      ACCESSION NUMBER(S):  [id number]      ORDERING CLINICIAN:  ADWOA ANSARI      TECHNIQUE:  Contiguous axial CT images were obtained through the head at 5 mm  slice thickness without contrast administration.      FINDINGS:  INTRACRANIAL:  Encephalomalacia is seen in the right parietal lobe, consistent with  prior infarct. Mild diffuse volume loss is seen bilaterally. Mild  subcortical and periventricular white matter changes are seen.  The  grey-white differentiation is intact. There is no mass effect or  midline shift. There is no extraaxial fluid collection. There is no  intracranial hemorrhage.  The calvarium  is unremarkable.      EXTRACRANIAL:  Visualized paranasal sinuses and mastoids are clear.      Impression: 1. Encephalomalacia in the right parietal lobe, consistent with prior  infarct.  2. Diffuse volume loss and periventricular white matter changes, most  consistent with small vessel ischemic disease.  3. No evidence of acute cortical infarct or intracranial hemorrhage.          MACRO:  None      Signed by: Igor Cabrera 12/14/2023 2:46 PM  Dictation workstation:   [id number]      Physical Exam  HENT:      Head: Normocephalic.      Nose: Nose normal.      Mouth/Throat:       Pharynx: Oropharynx is clear.   Eyes:      Conjunctiva/sclera: Conjunctivae normal.   Cardiovascular:      Rate and Rhythm: Normal rate and regular rhythm.   Pulmonary:      Breath sounds: Decreased breath sounds present.   Abdominal:      General: Bowel sounds are normal.      Palpations: Abdomen is soft.   Musculoskeletal:         General: Normal range of motion.      Cervical back: Normal range of motion and neck supple.   Skin:     General: Skin is dry.   Neurological:      Mental Status: He is alert and oriented to person, place, and time.      Sensory: Sensation is intact.      Motor: Motor function is intact.      Coordination: Coordination is intact.      Gait: Gait is intact.      Comments: Muscle strength in right upper and lower extremities 4/5   Psychiatric:         Mood and Affect: Mood normal.         Behavior: Behavior normal.         Relevant Results  Scheduled medications  amLODIPine, 5 mg, oral, Daily  aspirin, 81 mg, oral, Daily  atorvastatin, 40 mg, oral, Nightly  enoxaparin, 30 mg, subcutaneous, q24h  hydroCHLOROthiazide, 12.5 mg, oral, Daily  lisinopril, 20 mg, oral, Daily  pantoprazole, 40 mg, oral, Daily before breakfast   Or  pantoprazole, 40 mg, intravenous, Daily before breakfast  perflutren lipid microspheres, 0.5-10 mL of dilution, intravenous, Once in imaging  perflutren protein A microsphere, 0.5 mL, intravenous, Once in imaging  sulfur hexafluoride microsphr, 2 mL, intravenous, Once in imaging      Continuous medications  sodium chloride 0.9%, 75 mL/hr      PRN medications  PRN medications: acetaminophen **OR** acetaminophen **OR** acetaminophen, acetaminophen **OR** acetaminophen **OR** acetaminophen, ondansetron **OR** ondansetron, oxygen, polyethylene glycol  Results for orders placed or performed during the hospital encounter of 12/14/23 (from the past 24 hour(s))   CBC and Auto Differential   Result Value Ref Range    WBC 5.8 4.4 - 11.3 x10*3/uL    nRBC 0.0 0.0 - 0.0 /100 WBCs     RBC 4.11 (L) 4.50 - 5.90 x10*6/uL    Hemoglobin 10.8 (L) 13.5 - 17.5 g/dL    Hematocrit 34.0 (L) 41.0 - 52.0 %    MCV 83 80 - 100 fL    MCH 26.3 26.0 - 34.0 pg    MCHC 31.8 (L) 32.0 - 36.0 g/dL    RDW 16.8 (H) 11.5 - 14.5 %    Platelets 323 150 - 450 x10*3/uL    Neutrophils % 76.3 40.0 - 80.0 %    Immature Granulocytes %, Automated 0.3 0.0 - 0.9 %    Lymphocytes % 16.4 13.0 - 44.0 %    Monocytes % 4.7 2.0 - 10.0 %    Eosinophils % 1.6 0.0 - 6.0 %    Basophils % 0.7 0.0 - 2.0 %    Neutrophils Absolute 4.41 1.20 - 7.70 x10*3/uL    Immature Granulocytes Absolute, Automated 0.02 0.00 - 0.70 x10*3/uL    Lymphocytes Absolute 0.95 (L) 1.20 - 4.80 x10*3/uL    Monocytes Absolute 0.27 0.10 - 1.00 x10*3/uL    Eosinophils Absolute 0.09 0.00 - 0.70 x10*3/uL    Basophils Absolute 0.04 0.00 - 0.10 x10*3/uL   Comprehensive Metabolic Panel   Result Value Ref Range    Glucose 165 (H) 74 - 99 mg/dL    Sodium 125 (L) 136 - 145 mmol/L    Potassium 4.5 3.5 - 5.3 mmol/L    Chloride 93 (L) 98 - 107 mmol/L    Bicarbonate 23 21 - 32 mmol/L    Anion Gap 14 10 - 20 mmol/L    Urea Nitrogen 30 (H) 6 - 23 mg/dL    Creatinine 1.85 (H) 0.50 - 1.30 mg/dL    eGFR 39 (L) >60 mL/min/1.73m*2    Calcium 9.2 8.6 - 10.3 mg/dL    Albumin 4.2 3.4 - 5.0 g/dL    Alkaline Phosphatase 41 33 - 136 U/L    Total Protein 7.8 6.4 - 8.2 g/dL    AST 26 9 - 39 U/L    Bilirubin, Total 0.3 0.0 - 1.2 mg/dL    ALT 15 10 - 52 U/L   Magnesium   Result Value Ref Range    Magnesium 1.70 1.60 - 2.40 mg/dL   Lactate   Result Value Ref Range    Lactate 1.7 0.4 - 2.0 mmol/L   Troponin I, High Sensitivity, Initial   Result Value Ref Range    Troponin I, High Sensitivity 13 0 - 20 ng/L   ECG 12 lead   Result Value Ref Range    Ventricular Rate 55 BPM    Atrial Rate 55 BPM    ND Interval 216 ms    QRS Duration 96 ms    QT Interval 420 ms    QTC Calculation(Bazett) 401 ms    P Axis 69 degrees    R Axis 72 degrees    T Axis 59 degrees    QRS Count 9 beats    Q Onset 218 ms    P Onset  110 ms    P Offset 170 ms    T Offset 428 ms    QTC Fredericia 408 ms   ECG 12 lead   Result Value Ref Range    Ventricular Rate 56 BPM    Atrial Rate 56 BPM    PA Interval 208 ms    QRS Duration 86 ms    QT Interval 412 ms    QTC Calculation(Bazett) 397 ms    P Axis 67 degrees    R Axis 72 degrees    T Axis 46 degrees    QRS Count 9 beats    Q Onset 220 ms    P Onset 116 ms    P Offset 172 ms    T Offset 426 ms    QTC Fredericia 403 ms   Troponin, High Sensitivity, 1 Hour   Result Value Ref Range    Troponin I, High Sensitivity 12 0 - 20 ng/L   Urinalysis with Reflex Microscopic and Culture   Result Value Ref Range    Color, Urine Yellow Straw, Yellow    Appearance, Urine Clear Clear    Specific Gravity, Urine 1.012 1.005 - 1.035    pH, Urine 6.0 5.0, 5.5, 6.0, 6.5, 7.0, 7.5, 8.0    Protein, Urine 100 (2+) (N) NEGATIVE mg/dL    Glucose, Urine NEGATIVE NEGATIVE mg/dL    Blood, Urine NEGATIVE NEGATIVE    Ketones, Urine NEGATIVE NEGATIVE mg/dL    Bilirubin, Urine NEGATIVE NEGATIVE    Urobilinogen, Urine <2.0 <2.0 mg/dL    Nitrite, Urine NEGATIVE NEGATIVE    Leukocyte Esterase, Urine NEGATIVE NEGATIVE   Extra Urine Gray Tube   Result Value Ref Range    Extra Tube Hold for add-ons.    Urinalysis Microscopic   Result Value Ref Range    WBC, Urine NONE 1-5, NONE /HPF    RBC, Urine NONE NONE, 1-2, 3-5 /HPF    Mucus, Urine FEW Reference range not established. /LPF    Hyaline Casts, Urine 2+ (A) NONE /LPF   CBC   Result Value Ref Range    WBC 5.2 4.4 - 11.3 x10*3/uL    nRBC 0.0 0.0 - 0.0 /100 WBCs    RBC 3.98 (L) 4.50 - 5.90 x10*6/uL    Hemoglobin 10.4 (L) 13.5 - 17.5 g/dL    Hematocrit 33.0 (L) 41.0 - 52.0 %    MCV 83 80 - 100 fL    MCH 26.1 26.0 - 34.0 pg    MCHC 31.5 (L) 32.0 - 36.0 g/dL    RDW 16.9 (H) 11.5 - 14.5 %    Platelets 287 150 - 450 x10*3/uL   Basic metabolic panel   Result Value Ref Range    Glucose 98 74 - 99 mg/dL    Sodium 130 (L) 136 - 145 mmol/L    Potassium 4.1 3.5 - 5.3 mmol/L    Chloride 100 98 - 107  "mmol/L    Bicarbonate 21 21 - 32 mmol/L    Anion Gap 13 10 - 20 mmol/L    Urea Nitrogen 31 (H) 6 - 23 mg/dL    Creatinine 1.04 0.50 - 1.30 mg/dL    eGFR 77 >60 mL/min/1.73m*2    Calcium 9.1 8.6 - 10.3 mg/dL   Hepatic Function Panel   Result Value Ref Range    Albumin 4.0 3.4 - 5.0 g/dL    Bilirubin, Total 0.3 0.0 - 1.2 mg/dL    Bilirubin, Direct 0.0 0.0 - 0.3 mg/dL    Alkaline Phosphatase 33 33 - 136 U/L    ALT 10 10 - 52 U/L    AST 19 9 - 39 U/L    Total Protein 7.1 6.4 - 8.2 g/dL   B-Type Natriuretic Peptide   Result Value Ref Range    BNP 94 0 - 99 pg/mL   Lipid Panel   Result Value Ref Range    Cholesterol 156 0 - 199 mg/dL    HDL-Cholesterol 58.0 mg/dL    Cholesterol/HDL Ratio 2.7     LDL Calculated 88 <=99 mg/dL    VLDL 10 0 - 40 mg/dL    Triglycerides 51 0 - 149 mg/dL    Non HDL Cholesterol 98 0 - 149 mg/dL   Transthoracic Echo (TTE) Complete   Result Value Ref Range    BSA 1.6 m2          Assessment/Plan        Principal Problem:    TIA (transient ischemic attack)  Active Problems:    Acute embolic stroke (CMS/HCC)    #TIA with hx of CVA  #Essential HTN  -Right sided weakness since Monday evening  -Not on ASA, plavix, or other anticoagulation due to GI bleeding per pt   -Stopped taking statin because his \"cholesterol is fine\"  -CT head: 1. Encephalomalacia in the right parietal lobe, consistent with prior  infarct.  2. Diffuse volume loss and periventricular white matter changes, most  consistent with small vessel ischemic disease.  3. No evidence of acute cortical infarct or intracranial hemorrhage.  -MRI/MRA pending  -Lipid panel wnl  -Hgb A1C pending  -Trop 12  -Neuro tele stroke consult, appreciate recs  -Cardiology consult, appreciate recs  -Q4 neuro checks   -Q4 vital signs   -Cardiac monitoring  -Started ASA, atorvastatin   -Continue amlodipine, hydrochlorothiazide, lisinopril     #Tobacco use disorder  -Nicotine patch PRN  -Encourage cessation    DVT ppx  -lovenox    PUD ppx  -pantoprazole    F: " PRN  E: Replete per protocol  N: Regular  A: PIV    Disposition: Pt requires less than 2 inpatient days at this time  Code Status: Full Code    Total accumulated time spent face to face and not face to face preparing to see the patient, obtaining and reviewing separately obtained history; performing a medically appropriate examination and/or evaluation; counseling and educating the patient, family; ordering medications, tests, or procedures; referring and communicating with other health care professionals; documenting clinical information in the patient's medical record; independently interpreting results and communicating the results to the patient, family; and care coordination was 45 minutes.         Zoe Stephenson, APRN-CNP

## 2023-12-15 NOTE — PROGRESS NOTES
Patient evaluated at bedside. AAOX3. Lives with his wife and two adult children in a 2 story single family home. There are 3 steps with single railing to enter the residence. Once inside, he has 13 stairs with single railing to navigate to his bedroom. Patient denies any difficulty performing stairs.  DME: none. Independent in ADL's and iADL's. Patient does drive. He drove himself to the hospital and will drive himself home on discharge.  Patient denies any falls within the past 6 months. PCP: Queenie Garza last seen 12/14/23. Pharmacy: [facility] in [place]. Patient self manages their home medications directly from the medication bottles without difficulty, and denies issues with affordability. Patient denies any need for further assistance after discharge home. They feel comfortable going home when medically ready.     Discharge plan: Home no needs. PLAN SECURE.

## 2023-12-15 NOTE — CARE PLAN
The patient's goals for the shift include  to be discharged    The clinical goals for the shift include patient neurochecks to remain WNL this shift    Over the shift, the patient did make progress toward the following goals.     1530 Patient returned to floor from MRI. Assumed care of patient. Tele reapplied. Patient denies pain or complaints at this time.    1650 Cardiology at bedside.    1705 Dr. Chapin states patient is cleared from Cardiology standpoint.  states he spoke to Neurologist who agrees with discharge and no need to wait for MRI results. Dr. Chapin requests that patient is discharged with statin and Asa, message sent to ROSEMARY for discharge.    1905 Message sent to ROSEMARY regarding discussed discharge by cardiology. Order to discharge in am if stable. Patient made aware by charge nurse. Patient agrees to plan.    2200 Patient resting in bed. IV infusing as ordered. Denies complaints. Call light within reach.

## 2023-12-15 NOTE — CONSULTS
Consults  History Of Present Illness:    Denys Denton is a 68 y.o. male presenting with acute right lower extremity weakness and right upper extremity paresthesia, weakness, dysequilibrium    No palpitations or chest pain or dyspnea    Telemetry reviewed normal sinus rhythm     Imaging studies and old records reviewed    CT of Head showed Encephalomalacia in the right parietal lobe, consistent with prior infarct. 2. Diffuse volume loss and periventricular white matter changes, most consistent with small vessel ischemic disease. 3. No evidence of acute cortical infarct or intracranial hemorrhage.      EKG Sinus bradycardia with 1st degree AV block Otherwise normal ECG When compared with ECG of 14-DEC-2023     Past Medical History:  He has a past medical history of Personal history of other diseases of the circulatory system, Personal history of other specified conditions (08/30/2022), Personal history of transient ischemic attack (TIA), and cerebral infarction without residual deficits (05/05/2021), and Pure hypercholesterolemia, unspecified (08/30/2022).    Past Surgical History:  He has a past surgical history that includes Colostomy (05/08/2014); Knee surgery (05/08/2014); Other surgical history (05/05/2021); Other surgical history (05/05/2021); Other surgical history (06/23/2021); Other surgical history (06/17/2014); MR angio head wo IV contrast (12/15/2023); and MR angio neck wo IV contrast (12/15/2023).      Social History:  He reports that he has been smoking cigarettes. He has been smoking an average of 1 pack per day. He has been exposed to tobacco smoke. He has never used smokeless tobacco. He reports current alcohol use of about 28.0 standard drinks of alcohol per week. He reports that he does not use drugs.    Family History:  No family history on file.     Allergies:  Clopidogrel, Sulfa (sulfonamide antibiotics), and Sulfamethoxazole-trimethoprim    Outpatient Medications:  Current Outpatient  Medications   Medication Instructions    amLODIPine (NORVASC) 5 mg, oral, Daily    cetirizine (ZYRTEC) 5 mg, oral, Daily    lisinopriL-hydrochlorothiazide 20-12.5 mg tablet 1 tablet, oral, Daily         Inpatient Medications:  PRN medications   Medication    acetaminophen    Or    acetaminophen    Or    acetaminophen    acetaminophen    Or    acetaminophen    Or    acetaminophen    ondansetron    Or    ondansetron    oxygen    polyethylene glycol     Continuous Medications   Medication Dose Last Rate    sodium chloride 0.9%  75 mL/hr 75 mL/hr (12/15/23 1340)       Last Labs:  Results for orders placed or performed during the hospital encounter of 12/14/23 (from the past 96 hour(s))   CBC and Auto Differential   Result Value Ref Range    WBC 5.8 4.4 - 11.3 x10*3/uL    nRBC 0.0 0.0 - 0.0 /100 WBCs    RBC 4.11 (L) 4.50 - 5.90 x10*6/uL    Hemoglobin 10.8 (L) 13.5 - 17.5 g/dL    Hematocrit 34.0 (L) 41.0 - 52.0 %    MCV 83 80 - 100 fL    MCH 26.3 26.0 - 34.0 pg    MCHC 31.8 (L) 32.0 - 36.0 g/dL    RDW 16.8 (H) 11.5 - 14.5 %    Platelets 323 150 - 450 x10*3/uL    Neutrophils % 76.3 40.0 - 80.0 %    Immature Granulocytes %, Automated 0.3 0.0 - 0.9 %    Lymphocytes % 16.4 13.0 - 44.0 %    Monocytes % 4.7 2.0 - 10.0 %    Eosinophils % 1.6 0.0 - 6.0 %    Basophils % 0.7 0.0 - 2.0 %    Neutrophils Absolute 4.41 1.20 - 7.70 x10*3/uL    Immature Granulocytes Absolute, Automated 0.02 0.00 - 0.70 x10*3/uL    Lymphocytes Absolute 0.95 (L) 1.20 - 4.80 x10*3/uL    Monocytes Absolute 0.27 0.10 - 1.00 x10*3/uL    Eosinophils Absolute 0.09 0.00 - 0.70 x10*3/uL    Basophils Absolute 0.04 0.00 - 0.10 x10*3/uL   Comprehensive Metabolic Panel   Result Value Ref Range    Glucose 165 (H) 74 - 99 mg/dL    Sodium 125 (L) 136 - 145 mmol/L    Potassium 4.5 3.5 - 5.3 mmol/L    Chloride 93 (L) 98 - 107 mmol/L    Bicarbonate 23 21 - 32 mmol/L    Anion Gap 14 10 - 20 mmol/L    Urea Nitrogen 30 (H) 6 - 23 mg/dL    Creatinine 1.85 (H) 0.50 - 1.30 mg/dL     eGFR 39 (L) >60 mL/min/1.73m*2    Calcium 9.2 8.6 - 10.3 mg/dL    Albumin 4.2 3.4 - 5.0 g/dL    Alkaline Phosphatase 41 33 - 136 U/L    Total Protein 7.8 6.4 - 8.2 g/dL    AST 26 9 - 39 U/L    Bilirubin, Total 0.3 0.0 - 1.2 mg/dL    ALT 15 10 - 52 U/L   Magnesium   Result Value Ref Range    Magnesium 1.70 1.60 - 2.40 mg/dL   Lactate   Result Value Ref Range    Lactate 1.7 0.4 - 2.0 mmol/L   Troponin I, High Sensitivity, Initial   Result Value Ref Range    Troponin I, High Sensitivity 13 0 - 20 ng/L   ECG 12 lead   Result Value Ref Range    Ventricular Rate 55 BPM    Atrial Rate 55 BPM    TN Interval 216 ms    QRS Duration 96 ms    QT Interval 420 ms    QTC Calculation(Bazett) 401 ms    P Axis 69 degrees    R Axis 72 degrees    T Axis 59 degrees    QRS Count 9 beats    Q Onset 218 ms    P Onset 110 ms    P Offset 170 ms    T Offset 428 ms    QTC Fredericia 408 ms   ECG 12 lead   Result Value Ref Range    Ventricular Rate 56 BPM    Atrial Rate 56 BPM    TN Interval 208 ms    QRS Duration 86 ms    QT Interval 412 ms    QTC Calculation(Bazett) 397 ms    P Axis 67 degrees    R Axis 72 degrees    T Axis 46 degrees    QRS Count 9 beats    Q Onset 220 ms    P Onset 116 ms    P Offset 172 ms    T Offset 426 ms    QTC Fredericia 403 ms   Troponin, High Sensitivity, 1 Hour   Result Value Ref Range    Troponin I, High Sensitivity 12 0 - 20 ng/L   Urinalysis with Reflex Microscopic and Culture   Result Value Ref Range    Color, Urine Yellow Straw, Yellow    Appearance, Urine Clear Clear    Specific Gravity, Urine 1.012 1.005 - 1.035    pH, Urine 6.0 5.0, 5.5, 6.0, 6.5, 7.0, 7.5, 8.0    Protein, Urine 100 (2+) (N) NEGATIVE mg/dL    Glucose, Urine NEGATIVE NEGATIVE mg/dL    Blood, Urine NEGATIVE NEGATIVE    Ketones, Urine NEGATIVE NEGATIVE mg/dL    Bilirubin, Urine NEGATIVE NEGATIVE    Urobilinogen, Urine <2.0 <2.0 mg/dL    Nitrite, Urine NEGATIVE NEGATIVE    Leukocyte Esterase, Urine NEGATIVE NEGATIVE   Extra Urine Gray Tube    Result Value Ref Range    Extra Tube Hold for add-ons.    Urinalysis Microscopic   Result Value Ref Range    WBC, Urine NONE 1-5, NONE /HPF    RBC, Urine NONE NONE, 1-2, 3-5 /HPF    Mucus, Urine FEW Reference range not established. /LPF    Hyaline Casts, Urine 2+ (A) NONE /LPF   CBC   Result Value Ref Range    WBC 5.2 4.4 - 11.3 x10*3/uL    nRBC 0.0 0.0 - 0.0 /100 WBCs    RBC 3.98 (L) 4.50 - 5.90 x10*6/uL    Hemoglobin 10.4 (L) 13.5 - 17.5 g/dL    Hematocrit 33.0 (L) 41.0 - 52.0 %    MCV 83 80 - 100 fL    MCH 26.1 26.0 - 34.0 pg    MCHC 31.5 (L) 32.0 - 36.0 g/dL    RDW 16.9 (H) 11.5 - 14.5 %    Platelets 287 150 - 450 x10*3/uL   Basic metabolic panel   Result Value Ref Range    Glucose 98 74 - 99 mg/dL    Sodium 130 (L) 136 - 145 mmol/L    Potassium 4.1 3.5 - 5.3 mmol/L    Chloride 100 98 - 107 mmol/L    Bicarbonate 21 21 - 32 mmol/L    Anion Gap 13 10 - 20 mmol/L    Urea Nitrogen 31 (H) 6 - 23 mg/dL    Creatinine 1.04 0.50 - 1.30 mg/dL    eGFR 77 >60 mL/min/1.73m*2    Calcium 9.1 8.6 - 10.3 mg/dL   Hepatic Function Panel   Result Value Ref Range    Albumin 4.0 3.4 - 5.0 g/dL    Bilirubin, Total 0.3 0.0 - 1.2 mg/dL    Bilirubin, Direct 0.0 0.0 - 0.3 mg/dL    Alkaline Phosphatase 33 33 - 136 U/L    ALT 10 10 - 52 U/L    AST 19 9 - 39 U/L    Total Protein 7.1 6.4 - 8.2 g/dL   B-Type Natriuretic Peptide   Result Value Ref Range    BNP 94 0 - 99 pg/mL   Lipid Panel   Result Value Ref Range    Cholesterol 156 0 - 199 mg/dL    HDL-Cholesterol 58.0 mg/dL    Cholesterol/HDL Ratio 2.7     LDL Calculated 88 <=99 mg/dL    VLDL 10 0 - 40 mg/dL    Triglycerides 51 0 - 149 mg/dL    Non HDL Cholesterol 98 0 - 149 mg/dL   Transthoracic Echo (TTE) Complete   Result Value Ref Range    AV pk yovany 1.34     LVOT diam 2.10     LV biplane EF 72     MV avg E/e' ratio 7.39     MV E/A ratio 0.77     LA vol index A/L 28.2     Tricuspid annular plane systolic excursion 1.9     RV free wall pk S' 12.80     LVIDd 4.27     RVSP 21.8     Aortic  Valve Area by Continuity of Peak Velocity 2.79     AV pk grad 7.2     LV A4C EF 70.9           Last Recorded Vitals:  Vitals:    12/15/23 0657 12/15/23 0818 12/15/23 1133 12/15/23 1425   BP: 141/70 139/83 145/82 149/80   BP Location: Right arm Right arm Right arm Right arm   Patient Position: Lying Sitting Sitting Sitting   Pulse: 58 56 58 53   Resp: 16 18 18 18   Temp: 36.6 °C (97.9 °F) 36.7 °C (98.1 °F) 36.4 °C (97.5 °F) 36.5 °C (97.7 °F)   TempSrc: Temporal Temporal Temporal Temporal   SpO2: 96% 96% 100% 100%   Weight:       Height:         I/O last 3 completed shifts:  In: 500 (9 mL/kg) [IV Piggyback:500]  Out: - (0 mL/kg)   Weight: 55.8 kg       Physical Exam:  Constitutional: Well developed, awake/alert/oriented x3, no distress, alert and cooperative  Eyes: PERRL, EOMI, clear sclera  ENMT: mucous membranes moist, no apparent injury, no lesions seen  Head/Neck: Neck supple, no apparent injury, thyroid without mass or tenderness, No JVD, trachea midline, no bruits  Respiratory/Thorax: Patent airways, CTAB, normal breath sounds with good chest expansion, thorax symmetric  Cardiovascular: Regular, rate and rhythm, no murmurs, 2+ equal pulses of the extremities, normal S 1and S 2  Gastrointestinal: Nondistended, soft, non-tender, no rebound tenderness or guarding, no masses palpable, no organomegaly, +BS, no bruits  Musculoskeletal: ROM intact, no joint swelling, normal strength  Extremities: normal extremities, no cyanosis edema, contusions or wounds, no clubbing  Neurological: alert and oriented x3, intact senses, motor, response and reflexes, normal strength  Lymphatic: No significant lymphadenopathy  Psychological: Appropriate mood and behavior  Skin: Warm and dry, no lesions, no rashes     Assessment/Plan   Problem List Items Addressed This Visit          Neuro    * (Principal) TIA (transient ischemic attack) - Primary    Relevant Orders    Transthoracic Echo (TTE) Complete (Completed)    Acute embolic stroke  (CMS/HCC)    Old CVA  Tobacco user  HTN    Advise  Admitted and monitored on telemetry, no AF detected  Echo reviewed normal LV RV function no valve disease negative bubble study  MRI Brain pending  Stable for discharge on low dose ASA, statins, ACE-hydrochlorothiazide  OP Ziopatch, if negative will need loop recorder implant   Advised to quit smoking    Peripheral IV 12/14/23 20 G Right Antecubital (Active)   Site Assessment Clean;Dry;Intact 12/15/23 1539   Dressing Status Clean;Dry;Occlusive 12/15/23 1539   Number of days: 1       Code Status:  Full Code    I spent 30 minutes in the professional and overall care of this patient.        Tavares Hodge MD

## 2023-12-15 NOTE — NURSING NOTE
Secure chat to Chris Santiago NP- Doing the MRI screening form. Patient has bilateral titanium knee joints & is unsure if he has mesh from an ABD hernia repair. He says surgeries were after his last MRI.     Ordered to discontinue MRI order.

## 2023-12-15 NOTE — H&P
History and Physical         Denys Denton 68 y.o. 1955     History Of Present Illness  Denys Denton is a 68 y.o. male presented to [facility] from home. Chief complaint of acute right sided weakness. PMH of TIA noted.  On 12/11/2023 sudden onset right lower extremity and   right   upper extremity weakness and tingling. Patient reported that since Monday he has had gross Right sided weakness. He reported an inability to ambulate steadily. In the ED CT of Head showed Encephalomalacia in the right parietal lobe, consistent with prior infarct. 2. Diffuse volume loss and periventricular white matter changes, most consistent with small vessel ischemic disease. 3. No evidence of acute cortical infarct or intracranial hemorrhage.  -EKG Sinus bradycardia with 1st degree AV block Otherwise normal ECG When compared with ECG of 14-DEC-2023  On exam  patient resting in bed Alert x 4 CN II-XII grossly intact Bilateral Hand  noted. No  pronator drift noted. Patient was able to roll over to stomach for posterior lung exam with assistance of difficult. Patient denies pain cp dizziness SOB CHASE N/V/D .  No acute medical needs at this time.      Past Medical History  Past Medical History:   Diagnosis Date    Personal history of other diseases of the circulatory system     History of hypertension    Personal history of other specified conditions 08/30/2022    History of chest pain    Personal history of transient ischemic attack (TIA), and cerebral infarction without residual deficits 05/05/2021    History of cerebrovascular accident    Pure hypercholesterolemia, unspecified 08/30/2022    Hypercholesterolemia        Surgical History  He has a past surgical history that includes Colostomy (05/08/2014); Knee surgery (05/08/2014); Other surgical history (05/05/2021); Other surgical history (05/05/2021); Other surgical history (06/23/2021); and Other surgical history (06/17/2014).     Social  History  Social History     Socioeconomic History    Marital status:      Spouse name: Not on file    Number of children: Not on file    Years of education: Not on file    Highest education level: Not on file   Occupational History    Not on file   Tobacco Use    Smoking status: Every Day     Packs/day: 1     Types: Cigarettes     Passive exposure: Current    Smokeless tobacco: Never   Vaping Use    Vaping Use: Never used   Substance and Sexual Activity    Alcohol use: Yes     Alcohol/week: 28.0 standard drinks of alcohol     Types: 28 Cans of beer per week     Comment: daily colleen drinker 2-3 x many years    Drug use: Never    Sexual activity: Not on file   Other Topics Concern    Not on file   Social History Narrative    Not on file     Social Determinants of Health     Financial Resource Strain: Low Risk  (12/14/2023)    Overall Financial Resource Strain (CARDIA)     Difficulty of Paying Living Expenses: Not hard at all   Food Insecurity: Not on file   Transportation Needs: No Transportation Needs (12/14/2023)    PRAPARE - Transportation     Lack of Transportation (Medical): No     Lack of Transportation (Non-Medical): No   Physical Activity: Not on file   Stress: Not on file   Social Connections: Not on file   Intimate Partner Violence: Not on file   Housing Stability: Low Risk  (12/14/2023)    Housing Stability Vital Sign     Unable to Pay for Housing in the Last Year: No     Number of Places Lived in the Last Year: 1     Unstable Housing in the Last Year: No        Family History  No family history on file.     Allergies  Allergies   Allergen Reactions    Clopidogrel GI bleeding     May/June 2013    Sulfa (Sulfonamide Antibiotics) Hives and Rash    Sulfamethoxazole-Trimethoprim Rash        Vital Signs  Temp:  [36.3 °C (97.3 °F)-37.1 °C (98.8 °F)] 37.1 °C (98.8 °F)  Heart Rate:  [51-73] 61  Resp:  [15-18] 18  BP: (124-174)/(54-78) 124/54    Home Medications   Prior to Admission Medications   Prescriptions  Last Dose Informant Patient Reported? Taking?   amLODIPine (Norvasc) 5 mg tablet 12/14/2023 at 0900  Yes Yes   Sig: Take 1 tablet (5 mg) by mouth once daily.   cetirizine (ZyrTEC) 10 mg tablet 12/14/2023 at 0900  Yes Yes   Sig: Take 0.5 tablets (5 mg) by mouth once daily.   lisinopriL-hydrochlorothiazide 20-12.5 mg tablet 12/14/2023 at 0900  Yes Yes   Sig: Take 1 tablet by mouth once daily.      Facility-Administered Medications: None       New Hospital Orders  Current Facility-Administered Medications   Medication Dose Route Frequency Provider Last Rate Last Admin    acetaminophen (Tylenol) tablet 650 mg  650 mg oral q4h PRN DANIEL Berg        Or    acetaminophen (Tylenol) oral liquid 650 mg  650 mg nasogastric tube q4h PRN DANIEL Berg        Or    acetaminophen (Tylenol) suppository 650 mg  650 mg rectal q4h PRN DANIEL Berg        acetaminophen (Tylenol) tablet 650 mg  650 mg oral q4h PRN DANIEL Berg        Or    acetaminophen (Tylenol) oral liquid 650 mg  650 mg oral q4h PRN DANIEL Berg        Or    acetaminophen (Tylenol) suppository 650 mg  650 mg rectal q4h PRN DANIEL Berg        amLODIPine (Norvasc) tablet 5 mg  5 mg oral Daily DANIEL Ochoa        aspirin EC tablet 81 mg  81 mg oral Daily DANIEL Berg   81 mg at 12/14/23 2109    atorvastatin (Lipitor) tablet 40 mg  40 mg oral Nightly DANIEL Berg   40 mg at 12/14/23 2109    enoxaparin (Lovenox) syringe 30 mg  30 mg subcutaneous q24h DANIEL Ochoa        lisinopriL-hydrochlorothiazide 20-12.5 mg per tablet 1 tablet  1 tablet oral Daily DANIEL Ochoa        ondansetron (Zofran) tablet 4 mg  4 mg oral q8h PRN DANIEL Berg        Or    ondansetron (Zofran) injection 4 mg  4 mg intravenous q8h PRN Estela Brock, APRN-CNP        oxygen (O2) therapy   inhalation Continuous PRN - O2/gases  "ABDULKADIR Berg-CYRUS        pantoprazole (ProtoNix) EC tablet 40 mg  40 mg oral Daily before breakfast DANIEL Berg        Or    pantoprazole (ProtoNix) injection 40 mg  40 mg intravenous Daily before breakfast DANIEL Berg        polyethylene glycol (Glycolax, Miralax) packet 17 g  17 g oral Daily PRN DANIEL Berg               Review of Systems   All other systems reviewed and are negative.          Physical Exam  Constitutional:       Appearance: Normal appearance.   HENT:      Head: Normocephalic and atraumatic.      Nose: Nose normal.      Mouth/Throat:      Mouth: Mucous membranes are moist.      Pharynx: Oropharynx is clear.   Eyes:      Extraocular Movements: Extraocular movements intact.      Pupils: Pupils are equal, round, and reactive to light.   Cardiovascular:      Rate and Rhythm: Normal rate and regular rhythm.      Pulses: Normal pulses.      Heart sounds: Normal heart sounds.   Pulmonary:      Effort: Pulmonary effort is normal.      Breath sounds: Normal breath sounds.   Abdominal:      General: Abdomen is flat.      Palpations: Abdomen is soft.   Musculoskeletal:         General: Normal range of motion.      Cervical back: Normal range of motion and neck supple.   Skin:     General: Skin is warm and dry.   Neurological:      General: No focal deficit present.      Mental Status: He is alert and oriented to person, place, and time.   Psychiatric:         Mood and Affect: Mood normal.         Behavior: Behavior normal.            Last Recorded Vitals  Blood pressure 124/54, pulse 61, temperature 37.1 °C (98.8 °F), temperature source Temporal, resp. rate 18, height 1.651 m (5' 5\"), weight 55.8 kg (123 lb 0.3 oz), SpO2 97 %.    Relevant Results  Results for orders placed or performed during the hospital encounter of 12/14/23   CBC and Auto Differential   Result Value Ref Range    WBC 5.8 4.4 - 11.3 x10*3/uL    nRBC 0.0 0.0 - 0.0 /100 WBCs    RBC " 4.11 (L) 4.50 - 5.90 x10*6/uL    Hemoglobin 10.8 (L) 13.5 - 17.5 g/dL    Hematocrit 34.0 (L) 41.0 - 52.0 %    MCV 83 80 - 100 fL    MCH 26.3 26.0 - 34.0 pg    MCHC 31.8 (L) 32.0 - 36.0 g/dL    RDW 16.8 (H) 11.5 - 14.5 %    Platelets 323 150 - 450 x10*3/uL    Neutrophils % 76.3 40.0 - 80.0 %    Immature Granulocytes %, Automated 0.3 0.0 - 0.9 %    Lymphocytes % 16.4 13.0 - 44.0 %    Monocytes % 4.7 2.0 - 10.0 %    Eosinophils % 1.6 0.0 - 6.0 %    Basophils % 0.7 0.0 - 2.0 %    Neutrophils Absolute 4.41 1.20 - 7.70 x10*3/uL    Immature Granulocytes Absolute, Automated 0.02 0.00 - 0.70 x10*3/uL    Lymphocytes Absolute 0.95 (L) 1.20 - 4.80 x10*3/uL    Monocytes Absolute 0.27 0.10 - 1.00 x10*3/uL    Eosinophils Absolute 0.09 0.00 - 0.70 x10*3/uL    Basophils Absolute 0.04 0.00 - 0.10 x10*3/uL   Comprehensive Metabolic Panel   Result Value Ref Range    Glucose 165 (H) 74 - 99 mg/dL    Sodium 125 (L) 136 - 145 mmol/L    Potassium 4.5 3.5 - 5.3 mmol/L    Chloride 93 (L) 98 - 107 mmol/L    Bicarbonate 23 21 - 32 mmol/L    Anion Gap 14 10 - 20 mmol/L    Urea Nitrogen 30 (H) 6 - 23 mg/dL    Creatinine 1.85 (H) 0.50 - 1.30 mg/dL    eGFR 39 (L) >60 mL/min/1.73m*2    Calcium 9.2 8.6 - 10.3 mg/dL    Albumin 4.2 3.4 - 5.0 g/dL    Alkaline Phosphatase 41 33 - 136 U/L    Total Protein 7.8 6.4 - 8.2 g/dL    AST 26 9 - 39 U/L    Bilirubin, Total 0.3 0.0 - 1.2 mg/dL    ALT 15 10 - 52 U/L   Magnesium   Result Value Ref Range    Magnesium 1.70 1.60 - 2.40 mg/dL   Lactate   Result Value Ref Range    Lactate 1.7 0.4 - 2.0 mmol/L   Troponin I, High Sensitivity, Initial   Result Value Ref Range    Troponin I, High Sensitivity 13 0 - 20 ng/L   Troponin, High Sensitivity, 1 Hour   Result Value Ref Range    Troponin I, High Sensitivity 12 0 - 20 ng/L   Urinalysis with Reflex Microscopic and Culture   Result Value Ref Range    Color, Urine Yellow Straw, Yellow    Appearance, Urine Clear Clear    Specific Gravity, Urine 1.012 1.005 - 1.035    pH,  Urine 6.0 5.0, 5.5, 6.0, 6.5, 7.0, 7.5, 8.0    Protein, Urine 100 (2+) (N) NEGATIVE mg/dL    Glucose, Urine NEGATIVE NEGATIVE mg/dL    Blood, Urine NEGATIVE NEGATIVE    Ketones, Urine NEGATIVE NEGATIVE mg/dL    Bilirubin, Urine NEGATIVE NEGATIVE    Urobilinogen, Urine <2.0 <2.0 mg/dL    Nitrite, Urine NEGATIVE NEGATIVE    Leukocyte Esterase, Urine NEGATIVE NEGATIVE   Extra Urine Gray Tube   Result Value Ref Range    Extra Tube Hold for add-ons.    ECG 12 lead   Result Value Ref Range    Ventricular Rate 55 BPM    Atrial Rate 55 BPM    WY Interval 216 ms    QRS Duration 96 ms    QT Interval 420 ms    QTC Calculation(Bazett) 401 ms    P Axis 69 degrees    R Axis 72 degrees    T Axis 59 degrees    QRS Count 9 beats    Q Onset 218 ms    P Onset 110 ms    P Offset 170 ms    T Offset 428 ms    QTC Fredericia 408 ms   ECG 12 lead   Result Value Ref Range    Ventricular Rate 56 BPM    Atrial Rate 56 BPM    WY Interval 208 ms    QRS Duration 86 ms    QT Interval 412 ms    QTC Calculation(Bazett) 397 ms    P Axis 67 degrees    R Axis 72 degrees    T Axis 46 degrees    QRS Count 9 beats    Q Onset 220 ms    P Onset 116 ms    P Offset 172 ms    T Offset 426 ms    QTC Fredericia 403 ms   Urinalysis Microscopic   Result Value Ref Range    WBC, Urine NONE 1-5, NONE /HPF    RBC, Urine NONE NONE, 1-2, 3-5 /HPF    Mucus, Urine FEW Reference range not established. /LPF    Hyaline Casts, Urine 2+ (A) NONE /LPF        Imaging   ECG 12 Lead    Result Date: 12/14/2023  NSR 62    ECG 12 lead    Result Date: 12/14/2023  Sinus bradycardia Otherwise normal ECG When compared with ECG of 07-JUL-2022 04:27, No significant change was found See ED provider note for full interpretation and clinical correlation Confirmed by Anjelica Earl ([id number]) on 12/14/2023 8:55:11 PM    ECG 12 lead    Result Date: 12/14/2023  Sinus bradycardia with 1st degree AV block Otherwise normal ECG When compared with ECG of 14-DEC-2023 14:21, (unconfirmed) No significant  change was found See ED provider note for full interpretation and clinical correlation Confirmed by Anjelica Earl ([id number]) on 12/14/2023 8:54:25 PM    XR chest 2 views    Result Date: 12/14/2023  Interpreted By:  Gopal Shaw, STUDY: XR CHEST 2 VIEWS;  12/14/2023 2:45 pm   INDICATION: Signs/Symptoms:VCA SX.   COMPARISON: 07/07/2022   ACCESSION NUMBER(S): [id number]   ORDERING CLINICIAN: ADWOA ANSARI   FINDINGS: The lungs are clear without apparent pleural effusion. Unchanged appearance of the cardiomediastinal silhouette. Aortic atherosclerosis. No pulmonary vascular congestion. Thoracic degenerative changes are present.       No active disease in the chest.     Signed by: Gopal Shaw 12/14/2023 3:07 PM Dictation workstation:   [id number]    CT head wo IV contrast    Result Date: 12/14/2023  Interpreted By:  Igor Cabrera, STUDY: CT HEAD WO IV CONTRAST; 12/14/2023 2:36 pm   INDICATION: Stroke syndrome with right-sided weakness started on Monday with some interval improvement..   COMPARISON: None.   ACCESSION NUMBER(S): [id number]   ORDERING CLINICIAN: ADWOA ANSARI   TECHNIQUE: Contiguous axial CT images were obtained through the head at 5 mm slice thickness without contrast administration.   FINDINGS: INTRACRANIAL: Encephalomalacia is seen in the right parietal lobe, consistent with prior infarct. Mild diffuse volume loss is seen bilaterally. Mild subcortical and periventricular white matter changes are seen.  The grey-white differentiation is intact. There is no mass effect or midline shift. There is no extraaxial fluid collection. There is no intracranial hemorrhage.  The calvarium  is unremarkable.   EXTRACRANIAL: Visualized paranasal sinuses and mastoids are clear.       1. Encephalomalacia in the right parietal lobe, consistent with prior infarct. 2. Diffuse volume loss and periventricular white matter changes, most consistent with small vessel ischemic disease. 3. No evidence of acute cortical infarct  or intracranial hemorrhage.     MACRO: None   Signed by: Igor Cabrera 12/14/2023 2:46 PM Dictation workstation:   [id number]       Assessment/Plan   Principal Problem:    TIA (transient ischemic attack)  Active Problems:    Acute embolic stroke (CMS/HCC)  -On exam Patient moves all extremities well  -Patient noticed 12/11/2023 sudden onset right lower extremity and   right   upper extremity weakness and tingling.  Also denied speech difficulties.  He denied any chest pain dyspnea and cough.  Patient with a history of TIAs / strokes   -CT of Head Encephalomalacia in the right parietal lobe, consistent with prior infarct. 2. Diffuse volume loss and periventricular white matter changes, most consistent with small vessel ischemic disease. 3. No evidence of acute cortical infarct or intracranial hemorrhage.   -EKG Sinus bradycardia with 1st degree AV block Otherwise normal ECG When compared with ECG of 14-DEC-2023   -Ordered MRI then CX Patient has bilateral titanium knee joints & is unsure if he has mesh from an ABD hernia repair   -Order MRA of head   -Order  MRA of neck   -Pending labs Lipid Panel BNP Hba1c   -Repeat labs in AM   -Tele Monitor     ##HTN HLD CAD  Well Controlled   Continue home meds  Lipitor Lisinopril hydrochlorothiazide     DVT Prophylaxis Lovenox   Fluids: PRN   Electrolytes: replace as needed  Nutrition: Regular   Adjuncts: PIV        Total accumulated time spent face to face and not face to face preparing to see the patient, obtaining and reviewing separately obtained history; performing a medically appropriate examination and/or evaluation; counseling and educating the patient, family; ordering medications, tests, or procedures; referring and communicating with other health care professionals; documenting clinical information in the patient's medical record; independently interpreting results and communicating the results to the patient, family; and care coordination was 40  minutes      Chris  ANASTASIA Santiago, APRN-CNP

## 2023-12-15 NOTE — DISCHARGE INSTR - OTHER ORDERS
Thank you for choosing [facility] for your Health Care needs. As you transition from the hospital back to home, we hope we took your preferences into account on how you manage your health needs so you can manage your health at home.     You may receive a survey in the mail within the next couple weeks. Please take the time to complete it and return it. Your input is ALWAYS important to us. Thank you!  Your Care Transition Team - Grace Turner Amanda, Sonido Chaudhari - [phone]

## 2023-12-15 NOTE — TELECONSULT
HPI: Telestroke consult at   68 y.o. male with hx of stroke in 2013 with no residual deficits who presented to [facility] on 12/14th with 3d hx of R sided weakness.    Patient states he first noticed weakness in his R foot when coming back from work Monday evening (4 days prior). He was still able to drive home which is only 5min away. When he got home, he noticed his R had and arm was weak as well. Denies any sensory changes. Denies facial involvement or speech / language changes. The sx has been slowly improving since the onset. He went to his PCP yesterday where he was advised to go to ED. He was admitted for workup of R hemiparesis.    He states that he still has R hand fine motor issues such as writing. His typing on his laptop has been ok. Denies trouble with balance or walking. He has been eating ok without coughing or choking.    Note he has hx of R parietal ischemic infarct in 2013. He states he has been placed on plavix at that time, which resulted in gi bleed. Per chart review, this was secondary to AVM bleed in colon. He is unsure if he was taking both aspirin and plavix.     At the time of patient interview MRI / MRA results were pending. Subsequently MRI showed L corona radiata ischemic stroke. MRA did not reveal any significant stenosis.     Vascular neurology evaluation:  MRI brain 12/15/2023: L ren radiata   MRA head / neck: no flow-limiting stenosis  Echo: LVEF 70%, LA upper limit of normal, negative bubble, mild aortic valve regurgitation  LDL 12/15/2023: 88 (not on any meds)   A1c 12/15/2023: 5.4   BP on presentation 170/53, currently 153/73   APT: none prior; currently on 81mg     Last known Well: 12/11 evening (4d ago)   NIH Stroke Scale Reported: 0    Prior Functional Status (Modified [place] Scale):  0 No symptoms at all     Patient Active Problem List    Diagnosis Date Noted    Abnormal weight loss 12/14/2023    Amaurosis fugax of right eye 12/14/2023    Anemia 12/14/2023    AVM (arteriovenous  malformation) of colon 12/14/2023    AVM (arteriovenous malformation) of duodenum, acquired 12/14/2023    Benign hypertension 12/14/2023    Bilateral carotid artery stenosis 12/14/2023    CAD (coronary artery disease) 12/14/2023    Carotid artery stenosis 12/14/2023    COVID-19 virus infection 12/14/2023    Dyspnea on exertion 12/14/2023    Fatigue 12/14/2023    Gastroesophageal reflux disease 12/14/2023    Hypercholesterolemia 12/14/2023    Hyperlipidemia 12/14/2023    Hyponatremia 12/14/2023    Hypothyroidism 12/14/2023    Iron deficiency anemia 12/14/2023    Renal failure, acute (CMS/[facility]) 12/14/2023    Vitamin D deficiency 12/14/2023    Protein-calorie malnutrition, unspecified severity (CMS/[facility]) 12/14/2023    Chronic obstructive pulmonary disease, unspecified COPD type (CMS/[facility]) 12/14/2023    TIA (transient ischemic attack) 12/14/2023    Acute embolic stroke (CMS/[facility]) 12/14/2023    Stage 3 chronic kidney disease, unspecified whether stage 3a or 3b CKD (CMS/[facility]) 12/14/2023     Current Facility-Administered Medications   Medication Dose Route Frequency Provider Last Rate Last Admin    acetaminophen (Tylenol) tablet 650 mg  650 mg oral q4h PRN ABDULKADIR Odonnell-CNP        Or    acetaminophen (Tylenol) oral liquid 650 mg  650 mg nasogastric tube q4h PRN Zoe Stephenson APRN-CNP        Or    acetaminophen (Tylenol) suppository 650 mg  650 mg rectal q4h PRN ABDULKADIR Odonnell-CYRUS        acetaminophen (Tylenol) tablet 650 mg  650 mg oral q4h PRN ABDULKADIR Odonnell-CNP        Or    acetaminophen (Tylenol) oral liquid 650 mg  650 mg oral q4h PRN Zoe Stephenson APRN-CNP        Or    acetaminophen (Tylenol) suppository 650 mg  650 mg rectal q4h PRN DANIEL Odonnell        amLODIPine (Norvasc) tablet 5 mg  5 mg oral Daily Zoe Stephenson APRN-CNP   5 mg at 12/15/23 0836    aspirin EC tablet 81 mg  81 mg oral Daily Zoe Stephenson APRN-CNP   81 mg at 12/15/23 0836    atorvastatin (Lipitor) tablet 40 mg  40 mg  oral Nightly AIDAN OdonnellCNP   40 mg at 12/14/23 2109    enoxaparin (Lovenox) syringe 30 mg  30 mg subcutaneous q24h DANIEL Odonnell        hydroCHLOROthiazide (HYDRODiuril) tablet 12.5 mg  12.5 mg oral Daily ABDULKADIR Odonnell-CNP   12.5 mg at 12/15/23 0836    lisinopril tablet 20 mg  20 mg oral Daily ABDULKADIR Odonnell-CNP   20 mg at 12/15/23 0836    ondansetron (Zofran) tablet 4 mg  4 mg oral q8h PRN DANIEL Odonnell        Or    ondansetron (Zofran) injection 4 mg  4 mg intravenous q8h PRN DANIEL Odonnell        oxygen (O2) therapy   inhalation Continuous PRN - O2/gases DANIEL Odonnell        pantoprazole (ProtoNix) EC tablet 40 mg  40 mg oral Daily before breakfast ABDULKADIR Odonnell-CNP   40 mg at 12/15/23 0836    Or    pantoprazole (ProtoNix) injection 40 mg  40 mg intravenous Daily before breakfast DANIEL Odonnell        perflutren lipid microspheres (Definity) injection 0.5-10 mL of dilution  0.5-10 mL of dilution intravenous Once in imaging Estela DANIEL Coleman        perflutren protein A microsphere (Optison) injection 0.5 mL  0.5 mL intravenous Once in imaging Estela DANIEL Coleman        polyethylene glycol (Glycolax, Miralax) packet 17 g  17 g oral Daily PRN DANIEL Odonnell        sodium chloride 0.9% infusion  75 mL/hr intravenous Continuous DANIEL Odonnell 75 mL/hr at 12/15/23 1340 75 mL/hr at 12/15/23 1340    sulfur hexafluoride microsphr (Lumason) injection 24.28 mg  2 mL intravenous Once in imaging Estela DANIEL Coleman         Allergies: Clopidogrel, Sulfa (sulfonamide antibiotics), and Sulfamethoxazole-trimethoprim  Past Medical History:   Diagnosis Date    Personal history of other diseases of the circulatory system     History of hypertension    Personal history of other specified conditions 08/30/2022    History of chest pain    Personal history of transient ischemic attack (TIA), and cerebral  infarction without residual deficits 05/05/2021    History of cerebrovascular accident    Pure hypercholesterolemia, unspecified 08/30/2022    Hypercholesterolemia     Past Surgical History:   Procedure Laterality Date    COLOSTOMY  05/08/2014    Colostomy    KNEE SURGERY  05/08/2014    Knee Surgery    MR HEAD ANGIO WO IV CONTRAST  12/15/2023    MR HEAD ANGIO WO IV CONTRAST 12/15/2023 GEN MRI    MR NECK ANGIO WO IV CONTRAST  12/15/2023    MR NECK ANGIO WO IV CONTRAST 12/15/2023 GEN MRI    OTHER SURGICAL HISTORY  05/05/2021    Carpal tunnel surgery    OTHER SURGICAL HISTORY  05/05/2021    Sinus surgery    OTHER SURGICAL HISTORY  06/23/2021    Complete colonoscopy    OTHER SURGICAL HISTORY  06/17/2014    Carotid Artery Catheterization     No family history on file.  Social History     Tobacco Use    Smoking status: Every Day     Packs/day: 1     Types: Cigarettes     Passive exposure: Current    Smokeless tobacco: Never   Substance Use Topics    Alcohol use: Yes     Alcohol/week: 28.0 standard drinks of alcohol     Types: 28 Cans of beer per week     Comment: daily colleen drinker 2-3 x many years       Exam:  Vitals:    12/15/23 1950   BP: 153/73   Pulse: 59   Resp: 19   Temp: 37 °C (98.6 °F)   SpO2: 97%     NIHSS 0  Language intact  Face symmetric   No drift x4  Slowed finger tapping on R hand   Sensation intact  Gait intact     Imaging Results:  MRI: MR brain wo IV contrast    Result Date: 12/15/2023  Interpreted By:  Aryan Graff and Tavana Shahrzad STUDY: MR BRAIN WO IV CONTRAST; MR ANGIO HEAD WO IV CONTRAST; MR ANGIO NECK WO IV CONTRAST;  12/15/2023 3:27 pm   INDICATION: Signs/Symptoms:weakness; Signs/Symptoms:dizziness.  Stroke protocol.   COMPARISON: MRI 04/04/2013, CT 12/14/2023   ACCESSION NUMBER(S): [id number]; [id number]; [id number]   ORDERING CLINICIAN: MARIO ALBERTO ANDRE   TECHNIQUE: Axial T2, FLAIR, DWI, gradient echo T2 and  sagittal and coronal T1 weighted images of brain were acquired.    Time-of-flight MRA of the head  and neck was performed. The images were reviewed as source images and maximum intensity projections.   FINDINGS: Brain:   CSF Spaces: Mild prominence of the ventricles and sulci, consistent with mild generalized parenchymal volume loss. The basal cisterns are patent.   Parenchyma: There is a small focus of diffusion restriction within the left centrum semiovale corresponding to loss of signal on ADC map and associated FLAIR hyperintense signal, consistent with an acute/early subacute infarct. No significant mass effect or hemorrhagic conversion identified. Encephalomalacia is noted within the right parietal lobe corresponding to a remote focus of infarct. Moderate to severe burden of nonspecific T2 hyperintense signal in the cerebral white matter bilaterally , likely secondary to chronic small vessel ischemic disease in this age demographic.  No acute intracranial hemorrhage. There is no mass effect or midline shift.   Paranasal Sinuses and Mastoids: Polypoidal mucosal thickening predominantly involving the ethmoid air cells in the frontal sinuses is overall less prominent compared to prior MRI on 04/04/2013. The mastoid air cells are clear.     MRA of head:   Anterior circulation:  Slightly attenuated flow is noted within the proximal right A1 segment. Otherwise, there is expected flow signal in bilateral intracranial internal carotid arteries, bilateral carotid terminals, bilateral proximal anterior and middle cerebral arteries.   Posterior circulation:  Left dominant vertebral artery system. The right vertebral artery is hypoplastic and appears to terminate in PICA. The vertebrobasilar junction, basilar artery, bilateral superior cerebellar arteries and the right PCA demonstrate normal signal. Bilateral posterior communicating arteries are noted with a fetal origin of the left PCA supplying the left P2 segment and a hypoplastic left P1 segment.     MRA of neck:   The source images  are moderately degraded by artifact.   Right carotid vessels:  There is expected flow signal in the visualized portion of the common carotid artery.  There is mild attenuation of flow signal at the carotid bifurcation which may be secondary to flow related artifact. The internal carotid artery in the neck demonstrates expected flow signal.   Left carotid vessels:   There is expected flow signal in the visualized portion of the common carotid artery.  There is mild attenuation of flow signal at the carotid bifurcation which may be secondary to flow related artifact. The internal carotid artery in the neck demonstrates expected flow signal.   Vertebral vessels: Left dominant vertebral artery system. The visualized segments of the cervical vertebral arteries demonstrate expected flow signal.       MRI Brain:   Small focus of acute/early subacute infarct within the left centrum semiovale without significant mass effect or hemorrhagic conversion. A remote right parietal infarct is again noted with associated encephalomalacia, superimposed on moderate to severe burden of T2/FLAIR hyperintense signal in the periventricular white matter, suggestive of small-vessel ischemic change in this age demographic.   MRA: Slightly attenuated flow within the proximal A1 segment of the right FRANK may represent a focal mild narrowing of the vessel. Otherwise, no evidence of major vessel cutoff or significant stenosis on MRA head and neck.   MACRO: Dr. Leyla Garcia discussed the significance and urgency of this critical finding by epic secure chat with CNP MARIO ALBERTO ANDRE on 12/15/2023 at 3:45 pm.  (**-RCF-**) Findings:  See findings.   Signed by: Aryan Graff 12/15/2023 4:02 PM Dictation workstation:   [id number]   CT Head: CT head wo IV contrast    Result Date: 12/14/2023  Interpreted By:  Igor Cabrera, STUDY: CT HEAD WO IV CONTRAST; 12/14/2023 2:36 pm   INDICATION: Stroke syndrome with right-sided weakness started on Monday with  some interval improvement..   COMPARISON: None.   ACCESSION NUMBER(S): [id number]   ORDERING CLINICIAN: ADWOA ANSARI   TECHNIQUE: Contiguous axial CT images were obtained through the head at 5 mm slice thickness without contrast administration.   FINDINGS: INTRACRANIAL: Encephalomalacia is seen in the right parietal lobe, consistent with prior infarct. Mild diffuse volume loss is seen bilaterally. Mild subcortical and periventricular white matter changes are seen.  The grey-white differentiation is intact. There is no mass effect or midline shift. There is no extraaxial fluid collection. There is no intracranial hemorrhage.  The calvarium  is unremarkable.   EXTRACRANIAL: Visualized paranasal sinuses and mastoids are clear.       1. Encephalomalacia in the right parietal lobe, consistent with prior infarct. 2. Diffuse volume loss and periventricular white matter changes, most consistent with small vessel ischemic disease. 3. No evidence of acute cortical infarct or intracranial hemorrhage.     MACRO: None   Signed by: Igor Cabrera 12/14/2023 2:46 PM Dictation workstation:   [id number]     Vascular evaluation:  Echo: Transthoracic Echo (TTE) Complete    Result Date: 12/15/2023   [facility], [address] [place] [address]              Tel [phone] and Fax [phone] TRANSTHORACIC ECHOCARDIOGRAM REPORT  Patient Name:      RIDGE Cortez Physician:    [id number] Tavares Hodge MD Study Date:        12/15/2023          Ordering Provider:    [id number] ANDRE MRN/PID:           [id number]            Fellow: Accession#:        [id number]        Nurse:                Diane Branch RN Date of Birth/Age: 1955 / 68 years Sonographer:          Gina Britt RDCS Gender:            M                   Additional Staff: Height:            165.10 cm            Admit Date: Weight:            55.79 kg            Admission Status:     Inpatient - Routine BSA:               1.61 m2             Encounter#:           [id number]                                        Department Location:  [facility] Blood Pressure: 141 /70 mmHg Study Type:    TRANSTHORACIC ECHO (TTE) COMPLETE Diagnosis/ICD: Transient cerebral ischemic attack, unspecified (NOT on                LCD)-G45.9 Indication:    Transischemic Attack CPT Code:      Echo Complete w Full Doppler-[id number] Patient History: Pertinent History: Chest Pain, CAD, CVD, HTN, TIA, Dyspnea and COPD. GERD, CKD                    III, hypothyroid. Study Detail: The following Echo studies were performed: 2D, Doppler, M-Mode and               color flow. Agitated saline used as a contrast agent for               intraseptal flow evaluation. The patient was awake.  PHYSICIAN INTERPRETATION: Left Ventricle: The left ventricular systolic function is hyperdynamic, with an estimated ejection fraction of 70%. There are no regional wall motion abnormalities. The left ventricular cavity size is normal. There is mild concentric left ventricular hypertrophy. Spectral Doppler shows an impaired relaxation pattern of left ventricular diastolic filling. Left Atrium: The left atrium is upper limits of normal in size. A bubble study using agitated saline was performed. Bubble study is negative. Right Ventricle: The right ventricle is normal in size. There is normal right ventricular global systolic function. Right Atrium: The right atrium is normal in size. Aortic Valve: The aortic valve is trileaflet. There is mild aortic valve cusp calcification. There is mild aortic valve regurgitation. The peak instantaneous gradient of the aortic valve is 7.2 mmHg. Mitral Valve: The mitral valve is normal in structure. There is trace mitral valve regurgitation. Tricuspid Valve: The tricuspid valve is  structurally normal. There is trace to mild tricuspid regurgitation. Pulmonic Valve: The pulmonic valve is structurally normal. There is physiologic pulmonic valve regurgitation. Pericardium: There is no pericardial effusion noted. Aorta: The aortic root is normal. Pulmonary Artery: The tricuspid regurgitant velocity is 2.17 m/s, and with an estimated right atrial pressure of 3 mmHg, the estimated pulmonary artery pressure is normal with the RVSP at 21.8 mmHg. Pulmonary Veins: The pulmonary veins appear normal and return normally to the left atrium. Systemic Veins: The inferior vena cava size appears small. There is IVC inspiratory collapse greater than 50%.  CONCLUSIONS:  1. Left ventricular systolic function is hyperdynamic with a 70% estimated ejection fraction.  2. Spectral Doppler shows an impaired relaxation pattern of left ventricular diastolic filling.  3. Mild aortic valve regurgitation.  4. CVP is low. QUANTITATIVE DATA SUMMARY: 2D MEASUREMENTS:                          Normal Ranges: Ao Root d:     3.10 cm   (2.0-3.7cm) LAs:           3.10 cm   (2.7-4.0cm) IVSd:          1.03 cm   (0.6-1.1cm) LVPWd:         0.95 cm   (0.6-1.1cm) LVIDd:         4.27 cm   (3.9-5.9cm) LVIDs:         2.74 cm LV Mass Index: 86.6 g/m2 LV % FS        35.8 % LA VOLUME:                               Normal Ranges: LA Vol A4C:        35.8 ml    (22+/-6mL/m2) LA Vol A2C:        53.6 ml LA Vol BP:         45.4 ml LA Vol Index A4C:  22.3ml/m2 LA Vol Index A2C:  33.3 ml/m2 LA Vol Index BP:   28.2 ml/m2 LA Area A4C:       15.9 cm2 LA Area A2C:       18.8 cm2 LA Major Axis A4C: 6.0 cm LA Major Axis A2C: 5.6 cm LA Volume Index:   27.0 ml/m2 RA VOLUME BY A/L METHOD:                               Normal Ranges: RA Vol A4C:        33.2 ml    (8.3-19.5ml) RA Vol Index A4C:  20.7 ml/m2 RA Area A4C:       14.8 cm2 RA Major Axis A4C: 5.6 cm M-MODE MEASUREMENTS:                  Normal Ranges: Ao Root: 3.20 cm (2.0-3.7cm) LAs:     2.90 cm  (2.7-4.0cm) AORTA MEASUREMENTS:                    Normal Ranges: Asc Ao, d: 3.40 cm (2.1-3.4cm) LV SYSTOLIC FUNCTION BY 2D PLANIMETRY (MOD):                     Normal Ranges: EF-A4C View: 70.9 % (>=55%) EF-A2C View: 72.6 % EF-Biplane:  71.9 % LV DIASTOLIC FUNCTION:                               Normal Ranges: MV Peak E:        0.66 m/s    (0.7-1.2 m/s) MV Peak A:        0.87 m/s    (0.42-0.7 m/s) E/A Ratio:        0.77        (1.0-2.2) MV e'             0.09 m/s    (>8.0) MV lateral e'     0.11 m/s MV medial e'      0.07 m/s MV A Dur:         217.00 msec E/e' Ratio:       7.39        (<8.0) PulmV Sys Jose Roberto:    61.10 cm/s PulmV Dominguez Jose Roberto:   41.10 cm/s PulmV S/D Jose Roberto:    1.50 PulmV A Revs Jose Roberto: 47.90 cm/s PulmV A Revs Dur: 137.00 msec MITRAL VALVE:                 Normal Ranges: MV DT: 417 msec (150-240msec) AORTIC VALVE:                         Normal Ranges: AoV Vmax:      1.34 m/s (<=1.7m/s) AoV Peak P.2 mmHg (<20mmHg) LVOT Max Jose Roberto:  1.08 m/s (<=1.1m/s) LVOT VTI:      25.10 cm LVOT Diameter: 2.10 cm  (1.8-2.4cm) AoV Area,Vmax: 2.79 cm2 (2.5-4.5cm2) AORTIC INSUFFICIENCY: AI Vmax:       4.94 m/s AI Half-time:  635 msec AI Decel Rate: 228.00 cm/s2  RIGHT VENTRICLE: RV Basal 2.80 cm RV Mid   1.50 cm RV Major 8.1 cm TAPSE:   19.0 mm RV s'    0.13 m/s TRICUSPID VALVE/RVSP:                             Normal Ranges: Peak TR Velocity: 2.17 m/s RV Syst Pressure: 21.8 mmHg (< 30mmHg) IVC Diam:         1.10 cm PULMONIC VALVE:                      Normal Ranges: PV Max Jose Roberto: 1.0 m/s  (0.6-0.9m/s) PV Max P.2 mmHg Pulmonary Veins: PulmV A Revs Dur: 137.00 msec PulmV A Revs Jose Roberto: 47.90 cm/s PulmV Dominguez Jose Roberto:   41.10 cm/s PulmV S/D Jose Roberto:    1.50 PulmV Sys Jose Roberto:    61.10 cm/s  [id number] Tavares Hodge MD Electronically signed on 12/15/2023 at 2:58:52 PM  ** Final **   - pending final read  LDL:   88  A1c:   Results from last 7 days   Lab Units 12/15/23  0633   HEMOGLOBIN A1C % 5.4   5.4   Current antithrombotics:  aspirin      Assessment:   Denys Denton is a 68 y.o. male who presented to the hospital with R sided weakness, already improving. MRI confirmed L corona radiata ischemic stroke. Etiology likely Ischemic Stroke: Small-vessel disease    Diagnoses:  Remote hx of R parietal cortical infarct in 2013, unclear etiology, evaluation data unavailable   L corona radiata new ischemic infarct, likely small vessel disease   Hx of GIB 2/2 AVM on plavix   HTN    Tobacco use     Recommendations:   IV tPA is not recommended/ Rationale: outside of window, mild deficit  Patient is NOT a candidate for endovascular treatment/ Rationale: out of window, mild deficit      Additional Recommendations:   - agree with starting aspirin 81mg, will avoid more aggressive measures given hx of GIB, will coordinate care with patient's inpatient provider, outpatient PCP, GI and cardiology providers  - agree with starting high intensity statin, goal LDL<70  - continue optimization of BP, goal <130/90  - patient may need outpt OT given persistent R hand weakness  - follow up as outpatient with Dr. Blanquita Hodge (mentor clinic or virtual clinic)   - not addressed today but will discuss smoking cessation on follow up    Patient seen, evaluated and discussed with attending physician, Dr. Hodge.    Kirstie Morales PGY5  Vascular Neurology Fellow      Consult completed by: TELEPHONE and VIDEO interactive communication was used to provide this telehealth service. Time includes consultation with ED provider and extensive review of data- history, medical records, lab/radiology/medical test results, neuroimaging studies:  70 minutes was spent in INITIAL telehealth consultation    I saw and evaluated the patient. I personally obtained the key and critical portions of the history and physical exam or was physically present for key and critical portions performed by the resident/fellow. I reviewed the resident/fellow's documentation and discussed the patient  with the resident/fellow. I agree with the resident/fellow's medical decision making as documented in the note with the exception/addition of the following:  Of note, I was present for the entire telehealth visit.    On exam he does have subtle finding of right hemiparesis with right finger curling on [place] and orbiting around the right arm.  No other focal signs including no sensory changes.  Anticipate that he has a lacunar infarct.  Rec aspirin 81mg, atorvastatin 40mg, (will likely be able to lower statin dose later).  Of note, there is evidence that statins can reduced stroke risk through mechanisms independent of cholesterol, his goal LDL is less than 70.  Echo shows Ejection fraction of 70% with Left ventricular hypertrophy.      Addendum: MRI shows a small left corona radiata infarction (lacune) without large vessel occlusion.  OK to discharge to home as detailed by Dr Morales.

## 2023-12-16 VITALS
SYSTOLIC BLOOD PRESSURE: 161 MMHG | WEIGHT: 123.02 LBS | BODY MASS INDEX: 20.5 KG/M2 | TEMPERATURE: 98.2 F | OXYGEN SATURATION: 98 % | HEART RATE: 64 BPM | HEIGHT: 65 IN | DIASTOLIC BLOOD PRESSURE: 83 MMHG | RESPIRATION RATE: 14 BRPM

## 2023-12-16 LAB
ANION GAP SERPL CALC-SCNC: 9 MMOL/L (ref 10–20)
BUN SERPL-MCNC: 23 MG/DL (ref 6–23)
CALCIUM SERPL-MCNC: 8.9 MG/DL (ref 8.6–10.3)
CHLORIDE SERPL-SCNC: 102 MMOL/L (ref 98–107)
CO2 SERPL-SCNC: 24 MMOL/L (ref 21–32)
CREAT SERPL-MCNC: 0.91 MG/DL (ref 0.5–1.3)
ERYTHROCYTE [DISTWIDTH] IN BLOOD BY AUTOMATED COUNT: 16.8 % (ref 11.5–14.5)
GFR SERPL CREATININE-BSD FRML MDRD: >90 ML/MIN/1.73M*2
GLUCOSE SERPL-MCNC: 92 MG/DL (ref 74–99)
HCT VFR BLD AUTO: 31 % (ref 41–52)
HGB BLD-MCNC: 9.7 G/DL (ref 13.5–17.5)
MCH RBC QN AUTO: 26.1 PG (ref 26–34)
MCHC RBC AUTO-ENTMCNC: 31.3 G/DL (ref 32–36)
MCV RBC AUTO: 83 FL (ref 80–100)
NRBC BLD-RTO: 0 /100 WBCS (ref 0–0)
PLATELET # BLD AUTO: 276 X10*3/UL (ref 150–450)
POTASSIUM SERPL-SCNC: 4.1 MMOL/L (ref 3.5–5.3)
RBC # BLD AUTO: 3.72 X10*6/UL (ref 4.5–5.9)
SODIUM SERPL-SCNC: 131 MMOL/L (ref 136–145)
WBC # BLD AUTO: 4.9 X10*3/UL (ref 4.4–11.3)

## 2023-12-16 PROCEDURE — 36415 COLL VENOUS BLD VENIPUNCTURE: CPT

## 2023-12-16 PROCEDURE — 2500000001 HC RX 250 WO HCPCS SELF ADMINISTERED DRUGS (ALT 637 FOR MEDICARE OP)

## 2023-12-16 PROCEDURE — 80048 BASIC METABOLIC PNL TOTAL CA: CPT

## 2023-12-16 PROCEDURE — G0378 HOSPITAL OBSERVATION PER HR: HCPCS

## 2023-12-16 PROCEDURE — 97166 OT EVAL MOD COMPLEX 45 MIN: CPT | Mod: GO

## 2023-12-16 PROCEDURE — 85027 COMPLETE CBC AUTOMATED: CPT

## 2023-12-16 PROCEDURE — 2500000004 HC RX 250 GENERAL PHARMACY W/ HCPCS (ALT 636 FOR OP/ED): Mod: MUE

## 2023-12-16 PROCEDURE — 99231 SBSQ HOSP IP/OBS SF/LOW 25: CPT | Performed by: NURSE PRACTITIONER

## 2023-12-16 RX ORDER — ASPIRIN 81 MG/1
81 TABLET ORAL DAILY
Qty: 30 TABLET | Refills: 0 | Status: SHIPPED | OUTPATIENT
Start: 2023-12-16 | End: 2024-01-15

## 2023-12-16 RX ORDER — ATORVASTATIN CALCIUM 40 MG/1
40 TABLET, FILM COATED ORAL NIGHTLY
Qty: 30 TABLET | Refills: 0 | Status: SHIPPED | OUTPATIENT
Start: 2023-12-16 | End: 2023-12-18 | Stop reason: SDUPTHER

## 2023-12-16 RX ADMIN — AMLODIPINE BESYLATE 5 MG: 5 TABLET ORAL at 09:06

## 2023-12-16 RX ADMIN — LISINOPRIL 20 MG: 20 TABLET ORAL at 09:06

## 2023-12-16 RX ADMIN — ASPIRIN 81 MG: 81 TABLET, COATED ORAL at 09:06

## 2023-12-16 RX ADMIN — PANTOPRAZOLE SODIUM 40 MG: 40 TABLET, DELAYED RELEASE ORAL at 06:31

## 2023-12-16 RX ADMIN — HYDROCHLOROTHIAZIDE 12.5 MG: 12.5 TABLET ORAL at 09:06

## 2023-12-16 ASSESSMENT — COGNITIVE AND FUNCTIONAL STATUS - GENERAL
DAILY ACTIVITIY SCORE: 24
MOBILITY SCORE: 24
DAILY ACTIVITIY SCORE: 24

## 2023-12-16 ASSESSMENT — PAIN SCALES - GENERAL
PAINLEVEL_OUTOF10: 0 - NO PAIN
PAINLEVEL_OUTOF10: 0 - NO PAIN

## 2023-12-16 ASSESSMENT — PAIN - FUNCTIONAL ASSESSMENT: PAIN_FUNCTIONAL_ASSESSMENT: 0-10

## 2023-12-16 ASSESSMENT — ACTIVITIES OF DAILY LIVING (ADL)
BATHING_ASSISTANCE: INDEPENDENT
ADL_ASSISTANCE: INDEPENDENT

## 2023-12-16 NOTE — CARE PLAN
The clinical goals for the shift include Patient will have no change in neurochecks during shift    Over the shift, the patient did make progress toward the following goals. Patient being discharged home. No change in neurochecks, no deficits noted.

## 2023-12-16 NOTE — CARE PLAN
The patient's goals for the shift include      The clinical goals for the shift include patient neurochecks to remain WNL this shift    Pt ready to be discharged was anticipating a dishcarge on 12/15 and was upset that he had to stay another night. Otherwise no complaints or concerns

## 2023-12-16 NOTE — PROGRESS NOTES
Occupational Therapy    Evaluation    Patient Name: Denys Denton  MRN: [id number]  Today's Date: 12/16/2023  Time Calculation  Start Time: 0923  Stop Time: 0950  Time Calculation (min): 27 min    Assessment  IP OT Assessment  OT Assessment: OT evaluation completed.   OT intervention does not appear to be indicated at this time as the pt. seems to be at his pior level of functioning for self care skills & functional transfers.  Pt. verbalized agreement with this & declines the need for any further OT services at this time. OT intructed the pt. on activities & functional tasks that he can perform to address his  mild R UE coordination deficit.  Discontinue OT due to no further acute care OT needs at this time.  Evaluation/Treatment Tolerance: Patient tolerated treatment well  Medical Staff Made Aware: Yes  End of Session Communication: Bedside nurse  End of Session Patient Position: Up in chair, Alarm off, not on at start of session  Plan:  No Skilled OT: At baseline function  OT Discharge Recommendations: No further acute OT, No OT needed after discharge.  Pt. declines the need for outpatient OT.  OT Recommended Transfer Status: Independent  OT - OK to Discharge: Yes Based on completed evaluation and care plan recommendations, no barriers to discharge to next site of care      Subjective   Current Problem:  1. TIA (transient ischemic attack)  Transthoracic Echo (TTE) Complete    Transthoracic Echo (TTE) Complete    CANCELED: Transthoracic echo (TTE) complete    CANCELED: Transthoracic echo (TTE) complete      2. Acute embolic stroke (CMS/HCC)        3. Hyperlipidemia, unspecified hyperlipidemia type  aspirin 81 mg EC tablet    atorvastatin (Lipitor) 40 mg tablet        General:  General  Reason for Referral: Acute CVA  Referred By: Estela Chau CNP  Past Medical History Relevant to Rehab: HTN, CAD, GERD, TIA's, CVA's, hypothyroidism, colostomy, B TKA(12/22 & 1/23), AVM, COPD, CKD 3, vitamin D  deficiency.  Family/Caregiver Present: No  Prior to Session Communication: Bedside nurse  Patient Position Received: Up in chair, Alarm off, not on at start of session  Preferred Learning Style: verbal, visual  Precautions:  Precautions Comment: telemetry     Pain:No c/o pain at this time.     Objective   Cognition:  Overall Cognitive Status: Within Functional Limits  Attention: Within Functional Limits  Memory: Within Funtional Limits  Safety/Judgement: Within Functional Limits       Home Living:  Type of Home: House  Lives With:  His wife & his 2 adult children  Home Adaptive Equipment: grab bars in the shower  Home Layout: Two level, 1/2 bath on main level  Home Access: Stairs to enter with rails  Entrance Stairs-Rails: Both  Entrance Stairs-Number of Steps: 3 steps with 1 railing  Bathroom Shower/Tub: Walk-in shower  Bathroom Equipment: Grab bars in shower  Home Living Comments: Has a half bathroom on the first floor. The pt.'s bedroom & full bathroom(Walk in shower) are located on the second floor.   Pt. has 13 steps with 1 railing to access the second level of his home.  Prior Function:  Level of [place]: Independent with ADLs and functional transfers, Independent with homemaking with ambulation  ADL Assistance: Independent  Homemaking Assistance: Independent. Pt. reported that he does most of the meal prepartion &  laundry.  Ambulatory Assistance: Independent ambulation without a device.  Vocational: Pt. works at a golf course during the season at [place]  Leisure: Pt. reported that he likes to do projects around the house.  Hand Dominance: Right  IADL History:  Homemaking Responsibilities: Yes  Meal Prep Responsibility: Primary  Laundry Responsibility: Primary  Current License: Yes  ADL:  Eating Assistance: Independent  Grooming Assistance: Independent  Bathing Assistance: Independent  UE Dressing Assistance: Independent Independent donning a pull over shirt.  Toileting Assistance with  Device: Independent  Activity Tolerance:  Endurance: Endurance does not limit participation in activity  Bed Mobility/Transfers: Transfers  Transfer: Yes  Transfer 1  Technique 1: Stand pivot, Sit to stand, Stand to sit  Transfer Level of Assistance 1: Independent    Ambulation/Gait Training:  Ambulation/Gait Training  Ambulation/Gait Training Performed: Yes  Ambulation/Gait Training 1  Surface 1: Level tile  Device 1: No device  Assistance 1: Independent  Sitting Balance:  Static Sitting Balance  Static Sitting-Level of Assistance: Independent  Standing Balance:  Static Standing Balance  Static Standing-Level of Assistance: Independent  Dynamic Standing Balance  Dynamic Standing-Comments: Pt. was able to perform item retrieval up off of the floor inedpendently without any observed loss of balance at this time.    IADL's:   Homemaking Responsibilities: Yes  Meal Prep Responsibility: Primary  Laundry Responsibility: Primary  Current License: Yes  Vision: Vision - Basic Assessment  Current Vision: Pt. wears contact & glasses for reading.  Sensation:  Light Touch: No apparent deficits  Stereognosis: No apparent deficits  Proprioception: No apparent deficits  Strength:  Strength Comments:  L UE strength is WFL.  R UE:should flex & abd:G-, distal R UE strength is G  Perception:Pt.'s clock drawing was WFL.     Coordination:  Finger to [employer]: Slight dysmetria observed with R UE  Coordination Comment: Pt. was able to write his name using his R hand with good legibility.  Hand Function:  Hand Function  Gross Grasp: Functional  Coordination:  R UE fine motor coordination appears good-, however pt.'s R UE coordination is WFL for ADL performance(ie. tying shoes)  Extremities:   RUE : Within Functional Limits and LUE   LUE: Within Functional Limits    Outcome Measures: [facility] Daily Activity  Putting on and taking off regular lower body clothing: None  Bathing (including washing, rinsing, drying): None  Putting on and taking off  regular upper body clothing: None  Toileting, which includes using toilet, bedpan or urinal: None  Taking care of personal grooming such as brushing teeth: None  Eating Meals: None  Daily Activity - Total Score: 24      Education Documentation  Precautions, taught by Jazmin Porras OT at 12/16/2023 11:29 AM.  Learner: Patient  Readiness: Acceptance  Method: Explanation, Demonstration  Response: Verbalizes Understanding, Demonstrated Understanding  Comment: OT educated the pt. on OT services, R UE coordination exercises & activities & on safety with balance during transitional movements.    ADL Training, taught by Jazmin Porras OT at 12/16/2023 11:29 AM.  Learner: Patient  Readiness: Acceptance  Method: Explanation, Demonstration  Response: Verbalizes Understanding, Demonstrated Understanding  Comment: OT educated the pt. on OT services, R UE coordination exercises & activities & on safety with balance during transitional movements.

## 2023-12-16 NOTE — DISCHARGE SUMMARY
Discharge Diagnosis  TIA (transient ischemic attack)  Stroke/MRI brain 12/15/2023: L corona radiata   Tobacco use  Hypertension  Hyperlipidemia    HOSPITAL COURSE:    HPI:   68 y.o. male with hx of stroke in 2013 with no residual deficits who presented to [facility] on 12/14th with 3d hx of R sided weakness.     Patient states he first noticed weakness in his R foot when coming back [employer] Monday evening. He was still able to drive home. When he got home, he noticed his R had and arm was weak as well. Denies any sensory changes. Denies facial involvement or speech / language changes. The sx has been slowly improving since the onset. He went to his PCP who recommended he be evaluated in the ED for R hemiparesis.     He states that he still has R hand fine motor issues such as writing. His typing on his laptop has been ok. Denies trouble with balance or walking. He has been eating ok without coughing or choking.     Note he has hx of R parietal ischemic infarct in 2013. He states he has been placed on plavix at that time, which resulted in gi bleed.     Vascular neurology evaluation:  MRI brain 12/15/2023: L ren radiata   MRA head / neck: no flow-limiting stenosis  Echo: LVEF 70%, LA upper limit of normal, negative bubble, mild aortic valve regurgitation  LDL 12/15/2023: 88 (not on any meds)   A1c 12/15/2023: 5.4   BP on presentation 170/53  APT: none prior; currently on 81mg      Last known Well: 12/11 evening (4d ago)   NIH Stroke Scale Reported: 0     Prior Functional Status (Modified Efren Scale):  0 No symptoms at all     Exam:      Vitals:     12/15/23 1950   BP: 153/73   Pulse: 59   Resp: 19   Temp: 37 °C (98.6 °F)   SpO2: 97%      NIHSS 0  Language intact  Face symmetric   No drift x4  Slowed finger tapping on R hand   Sensation intact  Gait intact      Imaging Results:  MRI: MR brain wo IV contrast     Result Date: 12/15/2023  Interpreted By:  Aryan Graff,  and Jose Mayer STUDY: MR BRAIN WO IV  CONTRAST; MR ANGIO HEAD WO IV CONTRAST; MR ANGIO NECK WO IV CONTRAST;  12/15/2023 3:27 pm   INDICATION: Signs/Symptoms:weakness; Signs/Symptoms:dizziness.  Stroke protocol.   COMPARISON: MRI 04/04/2013, CT 12/14/2023   ACCESSION NUMBER(S): [id number]; [id number]; [id number]   ORDERING CLINICIAN: MARIO ALBERTO ANDRE   TECHNIQUE: Axial T2, FLAIR, DWI, gradient echo T2 and  sagittal and coronal T1 weighted images of brain were acquired.   Time-of-flight MRA of the head  and neck was performed. The images were reviewed as source images and maximum intensity projections.   FINDINGS: Brain:   CSF Spaces: Mild prominence of the ventricles and sulci, consistent with mild generalized parenchymal volume loss. The basal cisterns are patent.   Parenchyma: There is a small focus of diffusion restriction within the left centrum semiovale corresponding to loss of signal on ADC map and associated FLAIR hyperintense signal, consistent with an acute/early subacute infarct. No significant mass effect or hemorrhagic conversion identified. Encephalomalacia is noted within the right parietal lobe corresponding to a remote focus of infarct. Moderate to severe burden of nonspecific T2 hyperintense signal in the cerebral white matter bilaterally , likely secondary to chronic small vessel ischemic disease in this age demographic.  No acute intracranial hemorrhage. There is no mass effect or midline shift.   Paranasal Sinuses and Mastoids: Polypoidal mucosal thickening predominantly involving the ethmoid air cells in the frontal sinuses is overall less prominent compared to prior MRI on 04/04/2013. The mastoid air cells are clear.     MRA of head:   Anterior circulation:  Slightly attenuated flow is noted within the proximal right A1 segment. Otherwise, there is expected flow signal in bilateral intracranial internal carotid arteries, bilateral carotid terminals, bilateral proximal anterior and middle cerebral arteries.   Posterior  circulation:  Left dominant vertebral artery system. The right vertebral artery is hypoplastic and appears to terminate in PICA. The vertebrobasilar junction, basilar artery, bilateral superior cerebellar arteries and the right PCA demonstrate normal signal. Bilateral posterior communicating arteries are noted with a fetal origin of the left PCA supplying the left P2 segment and a hypoplastic left P1 segment.     MRA of neck:   The source images are moderately degraded by artifact.   Right carotid vessels:  There is expected flow signal in the visualized portion of the common carotid artery.  There is mild attenuation of flow signal at the carotid bifurcation which may be secondary to flow related artifact. The internal carotid artery in the neck demonstrates expected flow signal.   Left carotid vessels:   There is expected flow signal in the visualized portion of the common carotid artery.  There is mild attenuation of flow signal at the carotid bifurcation which may be secondary to flow related artifact. The internal carotid artery in the neck demonstrates expected flow signal.   Vertebral vessels: Left dominant vertebral artery system. The visualized segments of the cervical vertebral arteries demonstrate expected flow signal.        MRI Brain:   Small focus of acute/early subacute infarct within the left centrum semiovale without significant mass effect or hemorrhagic conversion. A remote right parietal infarct is again noted with associated encephalomalacia, superimposed on moderate to severe burden of T2/FLAIR hyperintense signal in the periventricular white matter, suggestive of small-vessel ischemic change in this age demographic.   MRA: Slightly attenuated flow within the proximal A1 segment of the right FRANK may represent a focal mild narrowing of the vessel. Otherwise, no evidence of major vessel cutoff or significant stenosis on MRA head and neck.   MACRO: Dr. Leyla Garcia discussed the significance  and urgency of this critical finding by [facility] secure chat with CYRUS MARIO ALBERTO ANDRE on 12/15/2023 at 3:45 pm.  (**-RCF-**) Findings:  See findings.   Signed by: Aryan Graff 12/15/2023 4:02 PM Dictation workstation:   [id number]   CT Head: CT head wo IV contrast     Result Date: 12/14/2023  Interpreted By:  Igor Cabrera, STUDY: CT HEAD WO IV CONTRAST; 12/14/2023 2:36 pm   INDICATION: Stroke syndrome with right-sided weakness started on Monday with some interval improvement..   COMPARISON: None.   ACCESSION NUMBER(S): [id number]   ORDERING CLINICIAN: ADWOA ANSARI   TECHNIQUE: Contiguous axial CT images were obtained through the head at 5 mm slice thickness without contrast administration.   FINDINGS: INTRACRANIAL: Encephalomalacia is seen in the right parietal lobe, consistent with prior infarct. Mild diffuse volume loss is seen bilaterally. Mild subcortical and periventricular white matter changes are seen.  The grey-white differentiation is intact. There is no mass effect or midline shift. There is no extraaxial fluid collection. There is no intracranial hemorrhage.  The calvarium  is unremarkable.   EXTRACRANIAL: Visualized paranasal sinuses and mastoids are clear.        1. Encephalomalacia in the right parietal lobe, consistent with prior infarct. 2. Diffuse volume loss and periventricular white matter changes, most consistent with small vessel ischemic disease. 3. No evidence of acute cortical infarct or intracranial hemorrhage.     MACRO: None   Signed by: Igor Cabrera 12/14/2023 2:46 PM Dictation workstation:   [id number]      Vascular evaluation:  Echo: Transthoracic Echo (TTE) Complete     Result Date: 12/15/2023   [facility], [address] [address]              Tel [phone] and Fax [phone] TRANSTHORACIC ECHOCARDIOGRAM REPORT  Patient Name:      RIDGE Cortez Physician:    [id number] Tavares                                                               Naveen BABIN Study Date:        12/15/2023          Ordering Provider:    [id number] MARIO ALBERTO ANDRE MRN/PID:           [id number]            Fellow: Accession#:        [id number]        Nurse:                Diane Branch RN Date of Birth/Age: 1955 / 68 years Sonographer:          Gina Britt RDCS Gender:            M                   Additional Staff: Height:            165.10 cm           Admit Date: Weight:            55.79 kg            Admission Status:     Inpatient - Routine BSA:               1.61 m2             Encounter#:           [id number]                                        Department Location:  [facility] Blood Pressure: 141 /70 mmHg Study Type:    TRANSTHORACIC ECHO (TTE) COMPLETE Diagnosis/ICD: Transient cerebral ischemic attack, unspecified (NOT on                LCD)-G45.9 Indication:    Transischemic Attack CPT Code:      Echo Complete w Full Doppler-[id number] Patient History: Pertinent History: Chest Pain, CAD, CVD, HTN, TIA, Dyspnea and COPD. GERD, CKD                    III, hypothyroid. Study Detail: The following Echo studies were performed: 2D, Doppler, M-Mode and               color flow. Agitated saline used as a contrast agent for               intraseptal flow evaluation. The patient was awake.  PHYSICIAN INTERPRETATION: Left Ventricle: The left ventricular systolic function is hyperdynamic, with an estimated ejection fraction of 70%. There are no regional wall motion abnormalities. The left ventricular cavity size is normal. There is mild concentric left ventricular hypertrophy. Spectral Doppler shows an impaired relaxation pattern of left ventricular diastolic filling. Left Atrium: The left atrium is upper limits of normal in size. A bubble study using agitated saline was performed. Bubble study is negative. Right Ventricle: The right ventricle is  normal in size. There is normal right ventricular global systolic function. Right Atrium: The right atrium is normal in size. Aortic Valve: The aortic valve is trileaflet. There is mild aortic valve cusp calcification. There is mild aortic valve regurgitation. The peak instantaneous gradient of the aortic valve is 7.2 mmHg. Mitral Valve: The mitral valve is normal in structure. There is trace mitral valve regurgitation. Tricuspid Valve: The tricuspid valve is structurally normal. There is trace to mild tricuspid regurgitation. Pulmonic Valve: The pulmonic valve is structurally normal. There is physiologic pulmonic valve regurgitation. Pericardium: There is no pericardial effusion noted. Aorta: The aortic root is normal. Pulmonary Artery: The tricuspid regurgitant velocity is 2.17 m/s, and with an estimated right atrial pressure of 3 mmHg, the estimated pulmonary artery pressure is normal with the RVSP at 21.8 mmHg. Pulmonary Veins: The pulmonary veins appear normal and return normally to the left atrium. Systemic Veins: The inferior vena cava size appears small. There is IVC inspiratory collapse greater than 50%.  CONCLUSIONS:  1. Left ventricular systolic function is hyperdynamic with a 70% estimated ejection fraction.  2. Spectral Doppler shows an impaired relaxation pattern of left ventricular diastolic filling.  3. Mild aortic valve regurgitation.  4. CVP is low. QUANTITATIVE DATA SUMMARY: 2D MEASUREMENTS:                          Normal Ranges: Ao Root d:     3.10 cm   (2.0-3.7cm) LAs:           3.10 cm   (2.7-4.0cm) IVSd:          1.03 cm   (0.6-1.1cm) LVPWd:         0.95 cm   (0.6-1.1cm) LVIDd:         4.27 cm   (3.9-5.9cm) LVIDs:         2.74 cm LV Mass Index: 86.6 g/m2 LV % FS        35.8 % LA VOLUME:                               Normal Ranges: LA Vol A4C:        35.8 ml    (22+/-6mL/m2) LA Vol A2C:        53.6 ml LA Vol BP:         45.4 ml LA Vol Index A4C:  22.3ml/m2 LA Vol Index A2C:  33.3 ml/m2 LA Vol  Index BP:   28.2 ml/m2 LA Area A4C:       15.9 cm2 LA Area A2C:       18.8 cm2 LA Major Axis A4C: 6.0 cm LA Major Axis A2C: 5.6 cm LA Volume Index:   27.0 ml/m2 RA VOLUME BY A/L METHOD:                               Normal Ranges: RA Vol A4C:        33.2 ml    (8.3-19.5ml) RA Vol Index A4C:  20.7 ml/m2 RA Area A4C:       14.8 cm2 RA Major Axis A4C: 5.6 cm M-MODE MEASUREMENTS:                  Normal Ranges: Ao Root: 3.20 cm (2.0-3.7cm) LAs:     2.90 cm (2.7-4.0cm) AORTA MEASUREMENTS:                    Normal Ranges: Asc Ao, d: 3.40 cm (2.1-3.4cm) LV SYSTOLIC FUNCTION BY 2D PLANIMETRY (MOD):                     Normal Ranges: EF-A4C View: 70.9 % (>=55%) EF-A2C View: 72.6 % EF-Biplane:  71.9 % LV DIASTOLIC FUNCTION:                               Normal Ranges: MV Peak E:        0.66 m/s    (0.7-1.2 m/s) MV Peak A:        0.87 m/s    (0.42-0.7 m/s) E/A Ratio:        0.77        (1.0-2.2) MV e'             0.09 m/s    (>8.0) MV lateral e'     0.11 m/s MV medial e'      0.07 m/s MV A Dur:         217.00 msec E/e' Ratio:       7.39        (<8.0) PulmV Sys Jose Roberto:    61.10 cm/s PulmV Dominguez Jose Roberto:   41.10 cm/s PulmV S/D Jose Roberto:    1.50 PulmV A Revs Jose Roberto: 47.90 cm/s PulmV A Revs Dur: 137.00 msec MITRAL VALVE:                 Normal Ranges: MV DT: 417 msec (150-240msec) AORTIC VALVE:                         Normal Ranges: AoV Vmax:      1.34 m/s (<=1.7m/s) AoV Peak P.2 mmHg (<20mmHg) LVOT Max Jose Roberto:  1.08 m/s (<=1.1m/s) LVOT VTI:      25.10 cm LVOT Diameter: 2.10 cm  (1.8-2.4cm) AoV Area,Vmax: 2.79 cm2 (2.5-4.5cm2) AORTIC INSUFFICIENCY: AI Vmax:       4.94 m/s AI Half-time:  635 msec AI Decel Rate: 228.00 cm/s2  RIGHT VENTRICLE: RV Basal 2.80 cm RV Mid   1.50 cm RV Major 8.1 cm TAPSE:   19.0 mm RV s'    0.13 m/s TRICUSPID VALVE/RVSP:                             Normal Ranges: Peak TR Velocity: 2.17 m/s RV Syst Pressure: 21.8 mmHg (< 30mmHg) IVC Diam:         1.10 cm PULMONIC VALVE:                      Normal Ranges: PV Max Jose Roberto:  1.0 m/s  (0.6-0.9m/s) PV Max P.2 mmHg Pulmonary Veins: PulmV A Revs Dur: 137.00 msec PulmV A Revs Jose Roberto: 47.90 cm/s PulmV Dominguez Jose Roberto:   41.10 cm/s PulmV S/D Jose Roberto:    1.50 PulmV Sys Jose Roberto:    61.10 cm/s  [id number] Tavares Hodge MD Electronically signed on 12/15/2023 at 2:58:52 PM  ** Final **   - pending final read  LDL:   88  A1c:        Results from last 7 days   Lab Units 12/15/23  0633   HEMOGLOBIN A1C % 5.4   5.4   Current antithrombotics:  aspirin      Assessment:   Denys Denton is a 68 y.o. male who presented to the hospital with R sided weakness, already improving. MRI confirmed L corona radiata ischemic stroke. 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 on plavix   HTN    Tobacco use      Recommendations:   IV tPA is not recommended/ Rationale: outside of window, mild deficit  Patient is NOT a candidate for endovascular treatment/ Rationale: out of window, mild deficit      Additional Recommendations:   - agree with starting aspirin 81mg, will avoid more aggressive measures given hx of GIB, will coordinate care with patient's inpatient provider, outpatient PCP, GI and cardiology providers  - agree with starting high intensity statin, goal LDL<70  - continue optimization of BP, goal <130/90  - patient may need outpt OT given persistent R hand weakness  - follow up as outpatient with Dr. Blanquita Hodge (mentor clinic or virtual clinic)             Discharge Meds     Your medication list        START taking these medications        Instructions Last Dose Given Next Dose Due   aspirin 81 mg EC tablet      Take 1 tablet (81 mg) by mouth once daily.       atorvastatin 40 mg tablet  Commonly known as: Lipitor      Take 1 tablet (40 mg) by mouth once daily at bedtime.              CONTINUE taking  these medications        Instructions Last Dose Given Next Dose Due   amLODIPine 5 mg tablet  Commonly known as: Norvasc           cetirizine 10 mg tablet  Commonly known as: ZyrTEC           lisinopriL-hydrochlorothiazide 20-12.5 mg tablet                     Where to Get Your Medications        These medications were sent to [facility] [address] [place] [address] [address] [facility]  [address] [facility] [address]      Phone: [phone]   aspirin 81 mg EC tablet  atorvastatin 40 mg tablet         Test Results Pending At Discharge  Pending Labs       No current pending labs.                Pertinent Physical Exam At Time of Discharge  Physical Exam  Vitals and nursing note reviewed.   Constitutional:       Appearance: Normal appearance. He is normal weight.   HENT:      Head: Normocephalic and atraumatic.      Right Ear: Tympanic membrane, ear canal and external ear normal.      Left Ear: Tympanic membrane, ear canal and external ear normal.      Nose: Nose normal.      Mouth/Throat:      Mouth: Mucous membranes are moist.      Pharynx: Oropharynx is clear.   Cardiovascular:      Rate and Rhythm: Normal rate and regular rhythm.      Pulses: Normal pulses.      Heart sounds: Normal heart sounds.   Pulmonary:      Effort: Pulmonary effort is normal.      Breath sounds: Normal breath sounds.   Abdominal:      General: Bowel sounds are normal.      Palpations: Abdomen is soft.   Musculoskeletal:         General: Normal range of motion.      Cervical back: Normal range of motion and neck supple.   Skin:     General: Skin is warm and dry.      Capillary Refill: Capillary refill takes less than 2 seconds.   Neurological:      General: No focal deficit present.      Mental Status: He is alert and oriented to person, place, and time. Mental status is at baseline.   Psychiatric:         Mood and Affect: Mood normal.         Behavior: Behavior normal.         Thought Content: Thought content normal.          Judgment: Judgment normal.     +slight weakness in right hand with  strength  And right foot      Outpatient Follow-Up  Discussed with patient regarding need for PT OT evaluations due to right side weakness; he declined  He verbalized need to see primary care doctor, cardiologist and neurologist. He will need heart monitor to be picked up at cards office.    Smoking cessation spent 6 minutes discussing with patient about the importance of smoking cessation, benefits and risks reviewed.    Needs to schedule followup appt with   1) primary care doctor  2) cardiologist  3) neurologist - Dr Alvares WILL SEE OUTPATIENT    Discharge home   Total accumulated time spent face to face and not face to face preparing to see the patient, obtaining and reviewing separately obtained history; performing a medically appropriate examination and/or evaluation; counseling and educating the patient, family; ordering medications, tests, or procedures; referring and communicating with other health care professionals; documenting clinical information in the patient's medical record; independently interpreting results and communicating the results to the patient, family; and care coordination was 45 minutes.          Estela Chau, ABDULKADIR-CNP

## 2023-12-16 NOTE — NURSING NOTE
0807 Dr. Orozco and Dai NP aware patient wants to discharge today.  1000 OT states he has not deficits, no OT needs for discharge.  1104 Discharge instructions given to patient. Educated on the new medications. Aware he needs to make follow up appointments upon discharge. Educated on symptoms of stroke and when to call 911 for stroke symptoms.

## 2023-12-18 ENCOUNTER — OFFICE VISIT (OUTPATIENT)
Dept: PRIMARY CARE | Facility: CLINIC | Age: 68
End: 2023-12-18
Payer: MEDICARE

## 2023-12-18 ENCOUNTER — TELEPHONE (OUTPATIENT)
Dept: PRIMARY CARE | Facility: CLINIC | Age: 68
End: 2023-12-18

## 2023-12-18 VITALS
BODY MASS INDEX: 21.47 KG/M2 | WEIGHT: 129 LBS | HEART RATE: 66 BPM | OXYGEN SATURATION: 97 % | SYSTOLIC BLOOD PRESSURE: 148 MMHG | DIASTOLIC BLOOD PRESSURE: 68 MMHG

## 2023-12-18 DIAGNOSIS — F17.200 NICOTINE DEPENDENCE, UNCOMPLICATED, UNSPECIFIED NICOTINE PRODUCT TYPE: ICD-10-CM

## 2023-12-18 DIAGNOSIS — I10 BENIGN HYPERTENSION: ICD-10-CM

## 2023-12-18 DIAGNOSIS — E03.9 HYPOTHYROIDISM, UNSPECIFIED TYPE: ICD-10-CM

## 2023-12-18 DIAGNOSIS — I63.9 CEREBROVASCULAR ACCIDENT (CVA), UNSPECIFIED MECHANISM (MULTI): Primary | ICD-10-CM

## 2023-12-18 DIAGNOSIS — E78.5 HYPERLIPIDEMIA, UNSPECIFIED HYPERLIPIDEMIA TYPE: ICD-10-CM

## 2023-12-18 DIAGNOSIS — I25.10 CORONARY ARTERY DISEASE INVOLVING NATIVE CORONARY ARTERY OF NATIVE HEART WITHOUT ANGINA PECTORIS: ICD-10-CM

## 2023-12-18 DIAGNOSIS — K31.819 AVM (ARTERIOVENOUS MALFORMATION) OF DUODENUM, ACQUIRED: ICD-10-CM

## 2023-12-18 PROCEDURE — 3078F DIAST BP <80 MM HG: CPT | Performed by: INTERNAL MEDICINE

## 2023-12-18 PROCEDURE — 99496 TRANSJ CARE MGMT HIGH F2F 7D: CPT | Performed by: INTERNAL MEDICINE

## 2023-12-18 PROCEDURE — 1160F RVW MEDS BY RX/DR IN RCRD: CPT | Performed by: INTERNAL MEDICINE

## 2023-12-18 PROCEDURE — 3008F BODY MASS INDEX DOCD: CPT | Performed by: INTERNAL MEDICINE

## 2023-12-18 PROCEDURE — 1111F DSCHRG MED/CURRENT MED MERGE: CPT | Performed by: INTERNAL MEDICINE

## 2023-12-18 PROCEDURE — 1159F MED LIST DOCD IN RCRD: CPT | Performed by: INTERNAL MEDICINE

## 2023-12-18 PROCEDURE — 4004F PT TOBACCO SCREEN RCVD TLK: CPT | Performed by: INTERNAL MEDICINE

## 2023-12-18 PROCEDURE — 1126F AMNT PAIN NOTED NONE PRSNT: CPT | Performed by: INTERNAL MEDICINE

## 2023-12-18 PROCEDURE — 3077F SYST BP >= 140 MM HG: CPT | Performed by: INTERNAL MEDICINE

## 2023-12-18 RX ORDER — ATORVASTATIN CALCIUM 40 MG/1
40 TABLET, FILM COATED ORAL NIGHTLY
Qty: 30 TABLET | Refills: 0 | Status: SHIPPED | OUTPATIENT
Start: 2023-12-18 | End: 2024-01-31 | Stop reason: SDUPTHER

## 2023-12-18 RX ORDER — FOLIC ACID 1 MG/1
1 TABLET ORAL
COMMUNITY

## 2023-12-18 NOTE — PROGRESS NOTES
Subjective   Patient ID: Denys Denton is a 68 y.o. male who presents for TCM  HPI    TCM    Dx stroke    Patient noticed 12/11/2023 sudden onset right lower extremity and   right   upper extremity weakness and tingling.  Also denied speech difficulties.  He denied any chest pain dyspnea and cough.  Patient with a history of TIAs / strokes  Sx improving slowly with exercises, aspirin  Neuro follow up  EKG ok    hypothyroidism on rx no side effects     s/p symptomatic anemia 2ry to AVM bleed  no bleeding seen  feels much better  was using a lot of NSAID's for arthritic pains     GERD stable on rx no side effects     CAD stable on rx no side effects     hypertension on rx no side effects     hypercholesterolemia on rx no side effects     h/o strokes 2013     nicotine cessation discussed     diet / exercise rev'd     follow up cardio / Dr Chapin     follow weights      Review of Systems   All other systems reviewed and are negative.      Objective   /68 (BP Location: Right arm, Patient Position: Sitting, BP Cuff Size: Adult)   Pulse 66   Wt 58.5 kg (129 lb)   SpO2 97%   BMI 21.47 kg/m²   Lab Results   Component Value Date    WBC 4.9 12/16/2023    HGB 9.7 (L) 12/16/2023    HCT 31.0 (L) 12/16/2023     12/16/2023    CHOL 156 12/15/2023    TRIG 51 12/15/2023    HDL 58.0 12/15/2023    ALT 10 12/15/2023    AST 19 12/15/2023     (L) 12/16/2023    K 4.1 12/16/2023     12/16/2023    CREATININE 0.91 12/16/2023    BUN 23 12/16/2023    CO2 24 12/16/2023    TSH 11.71 (H) 08/30/2022    INR 0.9 07/07/2022    HGBA1C 5.4 12/15/2023           Physical Exam  Vitals reviewed.   Constitutional:       Appearance: Normal appearance. He is normal weight.   HENT:      Head: Normocephalic and atraumatic.      Mouth/Throat:      Pharynx: No posterior oropharyngeal erythema.   Eyes:      General: No scleral icterus.     Conjunctiva/sclera: Conjunctivae normal.      Pupils: Pupils are equal, round, and reactive  to light.   Cardiovascular:      Rate and Rhythm: Normal rate and regular rhythm.      Heart sounds: Normal heart sounds.   Pulmonary:      Effort: No respiratory distress.      Breath sounds: No wheezing.   Abdominal:      General: Abdomen is flat. Bowel sounds are normal. There is no distension.      Palpations: Abdomen is soft. There is no mass.      Tenderness: There is no abdominal tenderness. There is no rebound.   Musculoskeletal:         General: Normal range of motion.      Cervical back: Normal range of motion and neck supple.   Skin:     General: Skin is warm and dry.   Neurological:      General: No focal deficit present.      Mental Status: He is alert and oriented to person, place, and time. Mental status is at baseline.   Psychiatric:         Mood and Affect: Mood normal.         Behavior: Behavior normal.         Thought Content: Thought content normal.         Judgment: Judgment normal.         Problem List Items Addressed This Visit             ICD-10-CM       Cardiac and Vasculature    Benign hypertension I10    CAD (coronary artery disease) I25.10    Hyperlipidemia E78.5    Relevant Medications    atorvastatin (Lipitor) 40 mg tablet       Endocrine/Metabolic    Hypothyroidism E03.9       Gastrointestinal and Abdominal    AVM (arteriovenous malformation) of duodenum, acquired K31.819     Other Visit Diagnoses         Codes    Cerebrovascular accident (CVA), unspecified mechanism (CMS/HCC)    -  Primary I63.9    Relevant Medications    atorvastatin (Lipitor) 40 mg tablet    Other Relevant Orders    PT eval and treat    Referral to Occupational Therapy    Nicotine dependence, uncomplicated, unspecified nicotine product type     F17.200    BMI 21.0-21.9, adult     Z68.21          Assessment/Plan     TCM    Dx stroke    Patient noticed 12/11/2023 sudden onset right lower extremity and   right   upper extremity weakness and tingling.  Also denied speech difficulties.  He denied any chest pain dyspnea  and cough.  Patient with a history of TIAs / strokes  Sx improving slowly with exercises, aspirin  Neuro and cardio  follow up  EKG ok    hypothyroidism on rx no side effects     s/p symptomatic anemia 2ry to AVM bleed  no bleeding seen  feels much better  was using a lot of NSAID's for arthritic pains     GERD stable on rx no side effects     CAD stable on rx no side effects     hypertension on rx no side effects     hypercholesterolemia on rx no side effects     h/o strokes 2013     nicotine cessation discussed     diet / exercise rev'd     follow up cardio / Dr Chapin     follow weights    Follow up 6 weeks

## 2023-12-18 NOTE — TELEPHONE ENCOUNTER
Transition of Care    Inpatient facility: [facility]  Discharge diagnosis: TIA  Discharged to: HOME  Discharge date: 12/16/2023  Initial Call date: 12/18/2023  Spoke with patient/caregiver: PATIENT                                                                     Do you need assistance  visits prior to your PCP visit: No  Home health care ordered: No  Have you been contacted by home care and have a start of care date: No  Are you taking medications as prescribed at discharge: Yes    Referral to [facility] Pharmacist: No  Patient advised to bring all medications to PCP follow-up appointment.  Patient advised to follow discharge instructions until provider follow-up.  TCM visit date: 12/18/2023  TCM provider visit with:

## 2023-12-20 ENCOUNTER — PATIENT OUTREACH (OUTPATIENT)
Dept: CARE COORDINATION | Facility: CLINIC | Age: 68
End: 2023-12-20
Payer: MEDICARE

## 2023-12-20 NOTE — PROGRESS NOTES
Outreach call to patient to support a smooth transition of care from recent admission.  Left voicemail message for patient with my contact information.  Enrolled patient in [employer] chatbot for additional support and patient education through transition period.  Will continue to monitor through transition period.    RAINA CovarrubiasN, RN

## 2024-01-10 PROBLEM — N17.0 ACUTE KIDNEY INJURY (AKI) WITH ACUTE TUBULAR NECROSIS (ATN) (CMS-HCC): Status: ACTIVE | Noted: 2023-12-14

## 2024-01-26 ENCOUNTER — APPOINTMENT (OUTPATIENT)
Dept: NEUROLOGY | Facility: HOSPITAL | Age: 69
End: 2024-01-26
Payer: MEDICARE

## 2024-01-26 PROBLEM — M17.10 ARTHRITIS OF KNEE: Status: ACTIVE | Noted: 2022-12-07

## 2024-01-26 PROBLEM — M17.12 PRIMARY OSTEOARTHRITIS OF LEFT KNEE: Status: ACTIVE | Noted: 2023-01-13

## 2024-01-31 ENCOUNTER — OFFICE VISIT (OUTPATIENT)
Dept: PRIMARY CARE | Facility: CLINIC | Age: 69
End: 2024-01-31
Payer: MEDICARE

## 2024-01-31 VITALS
SYSTOLIC BLOOD PRESSURE: 150 MMHG | WEIGHT: 127 LBS | DIASTOLIC BLOOD PRESSURE: 68 MMHG | HEART RATE: 73 BPM | BODY MASS INDEX: 22.5 KG/M2 | HEIGHT: 63 IN | OXYGEN SATURATION: 97 %

## 2024-01-31 DIAGNOSIS — F17.200 NICOTINE DEPENDENCE, UNCOMPLICATED, UNSPECIFIED NICOTINE PRODUCT TYPE: ICD-10-CM

## 2024-01-31 DIAGNOSIS — Z00.00 ENCOUNTER FOR SUBSEQUENT ANNUAL WELLNESS VISIT (AWV) IN MEDICARE PATIENT: Primary | ICD-10-CM

## 2024-01-31 DIAGNOSIS — I25.10 CORONARY ARTERY DISEASE INVOLVING NATIVE CORONARY ARTERY OF NATIVE HEART WITHOUT ANGINA PECTORIS: ICD-10-CM

## 2024-01-31 DIAGNOSIS — D64.9 ANEMIA, UNSPECIFIED TYPE: ICD-10-CM

## 2024-01-31 DIAGNOSIS — E78.5 HYPERLIPIDEMIA, UNSPECIFIED HYPERLIPIDEMIA TYPE: ICD-10-CM

## 2024-01-31 DIAGNOSIS — N18.30 STAGE 3 CHRONIC KIDNEY DISEASE, UNSPECIFIED WHETHER STAGE 3A OR 3B CKD (MULTI): ICD-10-CM

## 2024-01-31 DIAGNOSIS — E03.9 HYPOTHYROIDISM, UNSPECIFIED TYPE: ICD-10-CM

## 2024-01-31 DIAGNOSIS — E46 PROTEIN-CALORIE MALNUTRITION, UNSPECIFIED SEVERITY (MULTI): ICD-10-CM

## 2024-01-31 DIAGNOSIS — J44.9 CHRONIC OBSTRUCTIVE PULMONARY DISEASE, UNSPECIFIED COPD TYPE (MULTI): ICD-10-CM

## 2024-01-31 DIAGNOSIS — K21.9 GASTROESOPHAGEAL REFLUX DISEASE WITHOUT ESOPHAGITIS: ICD-10-CM

## 2024-01-31 DIAGNOSIS — Z86.73 HISTORY OF STROKE: ICD-10-CM

## 2024-01-31 DIAGNOSIS — I10 BENIGN HYPERTENSION: ICD-10-CM

## 2024-01-31 PROCEDURE — 3078F DIAST BP <80 MM HG: CPT | Performed by: INTERNAL MEDICINE

## 2024-01-31 PROCEDURE — 3077F SYST BP >= 140 MM HG: CPT | Performed by: INTERNAL MEDICINE

## 2024-01-31 PROCEDURE — G0439 PPPS, SUBSEQ VISIT: HCPCS | Performed by: INTERNAL MEDICINE

## 2024-01-31 PROCEDURE — 1124F ACP DISCUSS-NO DSCNMKR DOCD: CPT | Performed by: INTERNAL MEDICINE

## 2024-01-31 PROCEDURE — 3008F BODY MASS INDEX DOCD: CPT | Performed by: INTERNAL MEDICINE

## 2024-01-31 PROCEDURE — 99406 BEHAV CHNG SMOKING 3-10 MIN: CPT | Performed by: INTERNAL MEDICINE

## 2024-01-31 PROCEDURE — 1159F MED LIST DOCD IN RCRD: CPT | Performed by: INTERNAL MEDICINE

## 2024-01-31 PROCEDURE — 1126F AMNT PAIN NOTED NONE PRSNT: CPT | Performed by: INTERNAL MEDICINE

## 2024-01-31 PROCEDURE — 1160F RVW MEDS BY RX/DR IN RCRD: CPT | Performed by: INTERNAL MEDICINE

## 2024-01-31 PROCEDURE — 99214 OFFICE O/P EST MOD 30 MIN: CPT | Performed by: INTERNAL MEDICINE

## 2024-01-31 PROCEDURE — 1170F FXNL STATUS ASSESSED: CPT | Performed by: INTERNAL MEDICINE

## 2024-01-31 RX ORDER — ATORVASTATIN CALCIUM 40 MG/1
40 TABLET, FILM COATED ORAL NIGHTLY
Qty: 90 TABLET | Refills: 0 | Status: SHIPPED | OUTPATIENT
Start: 2024-01-31 | End: 2024-02-19

## 2024-01-31 ASSESSMENT — ACTIVITIES OF DAILY LIVING (ADL)
TAKING_MEDICATION: INDEPENDENT
BATHING: INDEPENDENT
DRESSING: INDEPENDENT
GROCERY_SHOPPING: INDEPENDENT
MANAGING_FINANCES: INDEPENDENT
DOING_HOUSEWORK: INDEPENDENT

## 2024-01-31 ASSESSMENT — ENCOUNTER SYMPTOMS
OCCASIONAL FEELINGS OF UNSTEADINESS: 0
DEPRESSION: 0
LOSS OF SENSATION IN FEET: 0

## 2024-01-31 ASSESSMENT — PATIENT HEALTH QUESTIONNAIRE - PHQ9
SUM OF ALL RESPONSES TO PHQ9 QUESTIONS 1 AND 2: 0
1. LITTLE INTEREST OR PLEASURE IN DOING THINGS: NOT AT ALL
2. FEELING DOWN, DEPRESSED OR HOPELESS: NOT AT ALL

## 2024-02-16 DIAGNOSIS — E78.5 HYPERLIPIDEMIA, UNSPECIFIED HYPERLIPIDEMIA TYPE: ICD-10-CM

## 2024-02-19 RX ORDER — ATORVASTATIN CALCIUM 40 MG/1
40 TABLET, FILM COATED ORAL NIGHTLY
Qty: 90 TABLET | Refills: 0 | Status: SHIPPED | OUTPATIENT
Start: 2024-02-19 | End: 2024-04-18 | Stop reason: SDUPTHER

## 2024-04-13 ENCOUNTER — LAB (OUTPATIENT)
Dept: LAB | Facility: LAB | Age: 69
End: 2024-04-13
Payer: MEDICARE

## 2024-04-13 DIAGNOSIS — D64.9 ANEMIA, UNSPECIFIED TYPE: ICD-10-CM

## 2024-04-13 LAB
BASOPHILS # BLD AUTO: 0.06 X10*3/UL (ref 0–0.1)
BASOPHILS NFR BLD AUTO: 1.3 %
EOSINOPHIL # BLD AUTO: 0.07 X10*3/UL (ref 0–0.7)
EOSINOPHIL NFR BLD AUTO: 1.5 %
ERYTHROCYTE [DISTWIDTH] IN BLOOD BY AUTOMATED COUNT: 19.3 % (ref 11.5–14.5)
HCT VFR BLD AUTO: 25.3 % (ref 41–52)
HGB BLD-MCNC: 7.2 G/DL (ref 13.5–17.5)
IMM GRANULOCYTES # BLD AUTO: 0.01 X10*3/UL (ref 0–0.7)
IMM GRANULOCYTES NFR BLD AUTO: 0.2 % (ref 0–0.9)
LYMPHOCYTES # BLD AUTO: 0.89 X10*3/UL (ref 1.2–4.8)
LYMPHOCYTES NFR BLD AUTO: 18.8 %
MCH RBC QN AUTO: 21.1 PG (ref 26–34)
MCHC RBC AUTO-ENTMCNC: 28.5 G/DL (ref 32–36)
MCV RBC AUTO: 74 FL (ref 80–100)
MONOCYTES # BLD AUTO: 0.45 X10*3/UL (ref 0.1–1)
MONOCYTES NFR BLD AUTO: 9.5 %
NEUTROPHILS # BLD AUTO: 3.26 X10*3/UL (ref 1.2–7.7)
NEUTROPHILS NFR BLD AUTO: 68.7 %
NRBC BLD-RTO: 0 /100 WBCS (ref 0–0)
PLATELET # BLD AUTO: 344 X10*3/UL (ref 150–450)
RBC # BLD AUTO: 3.41 X10*6/UL (ref 4.5–5.9)
WBC # BLD AUTO: 4.7 X10*3/UL (ref 4.4–11.3)

## 2024-04-13 PROCEDURE — 36415 COLL VENOUS BLD VENIPUNCTURE: CPT

## 2024-04-15 ENCOUNTER — APPOINTMENT (OUTPATIENT)
Dept: RADIOLOGY | Facility: HOSPITAL | Age: 69
End: 2024-04-15
Payer: MEDICARE

## 2024-04-15 ENCOUNTER — APPOINTMENT (OUTPATIENT)
Dept: CARDIOLOGY | Facility: HOSPITAL | Age: 69
End: 2024-04-15
Payer: MEDICARE

## 2024-04-15 ENCOUNTER — HOSPITAL ENCOUNTER (EMERGENCY)
Facility: HOSPITAL | Age: 69
Discharge: HOME | End: 2024-04-15
Attending: EMERGENCY MEDICINE
Payer: MEDICARE

## 2024-04-15 ENCOUNTER — HOSPITAL ENCOUNTER (OUTPATIENT)
Dept: CARDIOLOGY | Facility: HOSPITAL | Age: 69
Discharge: HOME | End: 2024-04-15
Payer: MEDICARE

## 2024-04-15 ENCOUNTER — TELEPHONE (OUTPATIENT)
Dept: PRIMARY CARE | Facility: CLINIC | Age: 69
End: 2024-04-15
Payer: MEDICARE

## 2024-04-15 VITALS
TEMPERATURE: 97.9 F | OXYGEN SATURATION: 96 % | RESPIRATION RATE: 18 BRPM | HEIGHT: 67 IN | BODY MASS INDEX: 19.62 KG/M2 | WEIGHT: 125 LBS | SYSTOLIC BLOOD PRESSURE: 148 MMHG | DIASTOLIC BLOOD PRESSURE: 91 MMHG | HEART RATE: 54 BPM

## 2024-04-15 DIAGNOSIS — D50.0 IRON DEFICIENCY ANEMIA DUE TO CHRONIC BLOOD LOSS: Primary | ICD-10-CM

## 2024-04-15 LAB
ABO GROUP (TYPE) IN BLOOD: NORMAL
ALBUMIN SERPL BCP-MCNC: 4.4 G/DL (ref 3.4–5)
ALP SERPL-CCNC: 46 U/L (ref 33–136)
ALT SERPL W P-5'-P-CCNC: 13 U/L (ref 10–52)
ANION GAP BLDV CALCULATED.4IONS-SCNC: 11 MMOL/L (ref 10–25)
ANION GAP SERPL CALC-SCNC: 15 MMOL/L (ref 10–20)
ANTIBODY SCREEN: NORMAL
APTT PPP: 30 SECONDS (ref 27–38)
AST SERPL W P-5'-P-CCNC: 20 U/L (ref 9–39)
BASE EXCESS BLDV CALC-SCNC: -1 MMOL/L (ref -2–3)
BASOPHILS # BLD AUTO: 0.04 X10*3/UL (ref 0–0.1)
BASOPHILS NFR BLD AUTO: 1 %
BILIRUB SERPL-MCNC: 0.3 MG/DL (ref 0–1.2)
BLOOD EXPIRATION DATE: NORMAL
BNP SERPL-MCNC: 385 PG/ML (ref 0–99)
BODY TEMPERATURE: ABNORMAL
BUN SERPL-MCNC: 31 MG/DL (ref 6–23)
CA-I BLDV-SCNC: 1.19 MMOL/L (ref 1.1–1.33)
CALCIUM SERPL-MCNC: 9.1 MG/DL (ref 8.6–10.3)
CARDIAC TROPONIN I PNL SERPL HS: 11 NG/L (ref 0–20)
CHLORIDE BLDV-SCNC: 104 MMOL/L (ref 98–107)
CHLORIDE SERPL-SCNC: 98 MMOL/L (ref 98–107)
CO2 SERPL-SCNC: 23 MMOL/L (ref 21–32)
CREAT SERPL-MCNC: 1.42 MG/DL (ref 0.5–1.3)
DISPENSE STATUS: NORMAL
EGFRCR SERPLBLD CKD-EPI 2021: 53 ML/MIN/1.73M*2
EOSINOPHIL # BLD AUTO: 0.09 X10*3/UL (ref 0–0.7)
EOSINOPHIL NFR BLD AUTO: 2.2 %
ERYTHROCYTE [DISTWIDTH] IN BLOOD BY AUTOMATED COUNT: 19.4 % (ref 11.5–14.5)
GLUCOSE BLDV-MCNC: 101 MG/DL (ref 74–99)
GLUCOSE SERPL-MCNC: 97 MG/DL (ref 74–99)
HCO3 BLDV-SCNC: 23.4 MMOL/L (ref 22–26)
HCT VFR BLD AUTO: 24.2 % (ref 41–52)
HCT VFR BLD EST: 20 % (ref 41–52)
HGB BLD-MCNC: 7.1 G/DL (ref 13.5–17.5)
HGB BLDV-MCNC: 6.8 G/DL (ref 13.5–17.5)
HYPOCHROMIA BLD QL SMEAR: NORMAL
IMM GRANULOCYTES # BLD AUTO: 0.03 X10*3/UL (ref 0–0.7)
IMM GRANULOCYTES NFR BLD AUTO: 0.7 % (ref 0–0.9)
INHALED O2 CONCENTRATION: 21 %
INR PPP: 1 (ref 0.9–1.1)
LACTATE BLDV-SCNC: 0.8 MMOL/L (ref 0.4–2)
LYMPHOCYTES # BLD AUTO: 1.03 X10*3/UL (ref 1.2–4.8)
LYMPHOCYTES NFR BLD AUTO: 24.8 %
MCH RBC QN AUTO: 21.8 PG (ref 26–34)
MCHC RBC AUTO-ENTMCNC: 29.3 G/DL (ref 32–36)
MCV RBC AUTO: 74 FL (ref 80–100)
MONOCYTES # BLD AUTO: 0.45 X10*3/UL (ref 0.1–1)
MONOCYTES NFR BLD AUTO: 10.8 %
NEUTROPHILS # BLD AUTO: 2.51 X10*3/UL (ref 1.2–7.7)
NEUTROPHILS NFR BLD AUTO: 60.5 %
NRBC BLD-RTO: 0 /100 WBCS (ref 0–0)
OXYHGB MFR BLDV: 73.8 % (ref 45–75)
PCO2 BLDV: 36 MM HG (ref 41–51)
PH BLDV: 7.42 PH (ref 7.33–7.43)
PLATELET # BLD AUTO: 326 X10*3/UL (ref 150–450)
PO2 BLDV: 47 MM HG (ref 35–45)
POLYCHROMASIA BLD QL SMEAR: NORMAL
POTASSIUM BLDV-SCNC: 4.3 MMOL/L (ref 3.5–5.3)
POTASSIUM SERPL-SCNC: 4.1 MMOL/L (ref 3.5–5.3)
PRODUCT BLOOD TYPE: 9500
PRODUCT CODE: NORMAL
PROT SERPL-MCNC: 8.1 G/DL (ref 6.4–8.2)
PROTHROMBIN TIME: 11.7 SECONDS (ref 9.8–12.8)
RBC # BLD AUTO: 3.26 X10*6/UL (ref 4.5–5.9)
RBC MORPH BLD: NORMAL
RH FACTOR (ANTIGEN D): NORMAL
SAO2 % BLDV: 78 % (ref 45–75)
SODIUM BLDV-SCNC: 134 MMOL/L (ref 136–145)
SODIUM SERPL-SCNC: 132 MMOL/L (ref 136–145)
UNIT ABO: NORMAL
UNIT NUMBER: NORMAL
UNIT RH: NORMAL
UNIT VOLUME: 350
WBC # BLD AUTO: 4.2 X10*3/UL (ref 4.4–11.3)
XM INTEP: NORMAL

## 2024-04-15 PROCEDURE — 93005 ELECTROCARDIOGRAM TRACING: CPT

## 2024-04-15 PROCEDURE — 36415 COLL VENOUS BLD VENIPUNCTURE: CPT | Performed by: STUDENT IN AN ORGANIZED HEALTH CARE EDUCATION/TRAINING PROGRAM

## 2024-04-15 PROCEDURE — 71046 X-RAY EXAM CHEST 2 VIEWS: CPT

## 2024-04-15 PROCEDURE — 84484 ASSAY OF TROPONIN QUANT: CPT | Performed by: STUDENT IN AN ORGANIZED HEALTH CARE EDUCATION/TRAINING PROGRAM

## 2024-04-15 PROCEDURE — 99283 EMERGENCY DEPT VISIT LOW MDM: CPT | Mod: 25

## 2024-04-15 PROCEDURE — 99285 EMERGENCY DEPT VISIT HI MDM: CPT | Mod: 25

## 2024-04-15 PROCEDURE — 85610 PROTHROMBIN TIME: CPT | Performed by: STUDENT IN AN ORGANIZED HEALTH CARE EDUCATION/TRAINING PROGRAM

## 2024-04-15 PROCEDURE — 86920 COMPATIBILITY TEST SPIN: CPT

## 2024-04-15 PROCEDURE — 86901 BLOOD TYPING SEROLOGIC RH(D): CPT | Performed by: STUDENT IN AN ORGANIZED HEALTH CARE EDUCATION/TRAINING PROGRAM

## 2024-04-15 PROCEDURE — 85025 COMPLETE CBC W/AUTO DIFF WBC: CPT | Performed by: STUDENT IN AN ORGANIZED HEALTH CARE EDUCATION/TRAINING PROGRAM

## 2024-04-15 PROCEDURE — 36430 TRANSFUSION BLD/BLD COMPNT: CPT

## 2024-04-15 PROCEDURE — P9016 RBC LEUKOCYTES REDUCED: HCPCS

## 2024-04-15 PROCEDURE — 84132 ASSAY OF SERUM POTASSIUM: CPT | Performed by: STUDENT IN AN ORGANIZED HEALTH CARE EDUCATION/TRAINING PROGRAM

## 2024-04-15 PROCEDURE — 71046 X-RAY EXAM CHEST 2 VIEWS: CPT | Performed by: RADIOLOGY

## 2024-04-15 PROCEDURE — 83880 ASSAY OF NATRIURETIC PEPTIDE: CPT | Performed by: STUDENT IN AN ORGANIZED HEALTH CARE EDUCATION/TRAINING PROGRAM

## 2024-04-15 ASSESSMENT — COLUMBIA-SUICIDE SEVERITY RATING SCALE - C-SSRS
1. IN THE PAST MONTH, HAVE YOU WISHED YOU WERE DEAD OR WISHED YOU COULD GO TO SLEEP AND NOT WAKE UP?: NO
6. HAVE YOU EVER DONE ANYTHING, STARTED TO DO ANYTHING, OR PREPARED TO DO ANYTHING TO END YOUR LIFE?: NO
2. HAVE YOU ACTUALLY HAD ANY THOUGHTS OF KILLING YOURSELF?: NO

## 2024-04-15 ASSESSMENT — PAIN SCALES - GENERAL: PAINLEVEL_OUTOF10: 0 - NO PAIN

## 2024-04-15 ASSESSMENT — LIFESTYLE VARIABLES
TOTAL SCORE: 0
HAVE PEOPLE ANNOYED YOU BY CRITICIZING YOUR DRINKING: NO
EVER HAD A DRINK FIRST THING IN THE MORNING TO STEADY YOUR NERVES TO GET RID OF A HANGOVER: NO
EVER FELT BAD OR GUILTY ABOUT YOUR DRINKING: NO
HAVE YOU EVER FELT YOU SHOULD CUT DOWN ON YOUR DRINKING: NO

## 2024-04-15 ASSESSMENT — PAIN - FUNCTIONAL ASSESSMENT: PAIN_FUNCTIONAL_ASSESSMENT: 0-10

## 2024-04-15 NOTE — ED PROVIDER NOTES
HPI   Chief Complaint   Patient presents with    abnormal labs       HPI  Patient is a 68-year-old male with past medical history of hypertension, hyperlipidemia, coronary artery disease, CKD, TIA, COPD and known AVM of the duodenum with chronic microcytic anemia who presents emergency department with chief complaint of abnormal labs.  Patient had routine lab work done as an outpatient and his hemoglobin was found to be 7.2.  He chronically has anemia and notes that the reason that he got labs drawn was that he was starting experience some exertional shortness of breath that is consistent with prior times where she has required transfusion.  He does have a known AVM in the duodenum that is being watched by GI.  He has not noticed any bleeding symptoms specifically denying any melena, hemoptysis, hematochezia or hematuria.  No chest pain, lightheadedness or syncopal events.    PMH:as above.  Meds:reviewed in EMR.  PSH:reviewed in EMR.  allergies:reviewed in EMR.  social:Denies X3.  Family History: non-contributory to acute presentation.    A full 10 point Review of Systems was reviewed with the patient and is negative unless stated in the HPI.                  Rainier Coma Scale Score: 15                     Patient History   Past Medical History:   Diagnosis Date    Personal history of other diseases of the circulatory system     History of hypertension    Personal history of other specified conditions 08/30/2022    History of chest pain    Personal history of transient ischemic attack (TIA), and cerebral infarction without residual deficits 05/05/2021    History of cerebrovascular accident    Pure hypercholesterolemia, unspecified 08/30/2022    Hypercholesterolemia     Past Surgical History:   Procedure Laterality Date    COLOSTOMY  05/08/2014    Colostomy    KNEE SURGERY  05/08/2014    Knee Surgery    MR HEAD ANGIO WO IV CONTRAST  12/15/2023    MR HEAD ANGIO WO IV CONTRAST 12/15/2023 GEN MRI    MR NECK ANGIO WO IV  CONTRAST  12/15/2023    MR NECK ANGIO WO IV CONTRAST 12/15/2023 GEN MRI    OTHER SURGICAL HISTORY  05/05/2021    Carpal tunnel surgery    OTHER SURGICAL HISTORY  05/05/2021    Sinus surgery    OTHER SURGICAL HISTORY  06/23/2021    Complete colonoscopy    OTHER SURGICAL HISTORY  06/17/2014    Carotid Artery Catheterization     No family history on file.  Social History     Tobacco Use    Smoking status: Every Day     Current packs/day: 1.00     Types: Cigarettes     Passive exposure: Current    Smokeless tobacco: Never   Vaping Use    Vaping status: Never Used   Substance Use Topics    Alcohol use: Yes     Alcohol/week: 28.0 standard drinks of alcohol     Types: 28 Cans of beer per week     Comment: daily colleen drinker 2-3 x many years    Drug use: Never       Physical Exam   ED Triage Vitals [04/15/24 1630]   Temperature Heart Rate Respirations BP   36.3 °C (97.3 °F) 76 18 152/57      Pulse Ox Temp Source Heart Rate Source Patient Position   98 % Temporal Monitor --      BP Location FiO2 (%)     -- --       Physical Exam    Physical Exam:    Appearance: Alert, oriented , cooperative,  in no acute distress. Well nourished & well hydrated.    Skin: Intact,  dry skin, no lesions, rash, petechiae or purpura.     Eyes: PERRLA, EOMs intact,  No scleral injection. No scleral icterus.     ENT: Hearing grossly intact. External auditory canals patent. Nares patent, mucus membranes moist. Dentition without lesions.     Neck: Supple, without meningismus. Trachea at midline.     Pulmonary: Clear bilaterally with good chest wall excursion. No rales, rhonchi or wheezing. No accessory muscle use or stridor.    Cardiac: Normal S1, S2 without murmur, rub, gallop or extrasystole. No JVD, Carotids without bruits.    Abdomen: Soft, nontender.  No palpable organomegaly.  No rebound or guarding.      Genitourinary: Exam deferred.    Musculoskeletal:  no edema, or deformity. Pulses full and equal. No cyanosis or clubbing.    Neurological:   Moving all 4 extremities equally, no focal findings identified    Psychiatric: Appropriate mood and affect.     ED Course & MDM   Diagnoses as of 04/17/24 1433   Iron deficiency anemia due to chronic blood loss       Medical Decision Making  Patient is a 68-year-old male with past medical history of hypertension, hyperlipidemia, coronary artery disease, CKD, TIA, COPD and known AVM of the duodenum with chronic microcytic anemia who presents emergency department with low hemoglobin on outpatient labs.  He was asymptomatic.  He is hemodynamically stable apart from mild hypertension.  Normal physical exam.  No signs and symptoms of bleeding however he does have a known arteriovenous malformation of the duodenum that was diagnosed by pill endoscopy which is likely a very slow source of blood loss from the GI tract for him causing his hemoglobin drift.      Labs including basic labs and type and screen were obtained in anticipation of likely transfusion.  His hemoglobin was 7.1 however due to the fact that he had a known source of slow blood loss from GI tract that he would drift below that in a matter of days we consented him for 1 unit of packed red blood cells and he was transfused.  Following transfusion which the patient tolerated, he was discharged from the emergency department.  He will follow-up as an outpatient regarding the AVM and hopefully find a resolution or intervention that can fix this.      This patient was discussed with Dr. Humphries who agrees.      Sarthak Sr MD  Emergency Medicine, PGY3    Procedure  Procedures     Denys Sr MD  Resident  04/17/24 7710

## 2024-04-15 NOTE — ED TRIAGE NOTES
Patient c/o SOB on exertion for the past few weeks, patient had lab work done on Saturday and his hemoglobin came back at 7.2, patients PCP advised him to come the ED.

## 2024-04-15 NOTE — RESULT ENCOUNTER NOTE
Please call the patient regarding his abnormal result.  Pt has severe anemia  Needs to go to ED ASAP

## 2024-04-16 LAB
ATRIAL RATE: 61 BPM
P AXIS: 10 DEGREES
P OFFSET: 177 MS
P ONSET: 137 MS
PR INTERVAL: 168 MS
Q ONSET: 221 MS
QRS COUNT: 10 BEATS
QRS DURATION: 84 MS
QT INTERVAL: 424 MS
QTC CALCULATION(BAZETT): 426 MS
QTC FREDERICIA: 426 MS
R AXIS: -16 DEGREES
T AXIS: 0 DEGREES
T OFFSET: 433 MS
VENTRICULAR RATE: 61 BPM

## 2024-04-18 ENCOUNTER — OFFICE VISIT (OUTPATIENT)
Dept: PRIMARY CARE | Facility: CLINIC | Age: 69
End: 2024-04-18
Payer: MEDICARE

## 2024-04-18 VITALS
DIASTOLIC BLOOD PRESSURE: 70 MMHG | OXYGEN SATURATION: 97 % | HEART RATE: 67 BPM | WEIGHT: 125.2 LBS | BODY MASS INDEX: 19.61 KG/M2 | SYSTOLIC BLOOD PRESSURE: 140 MMHG

## 2024-04-18 DIAGNOSIS — I10 BENIGN HYPERTENSION: ICD-10-CM

## 2024-04-18 DIAGNOSIS — E78.5 HYPERLIPIDEMIA, UNSPECIFIED HYPERLIPIDEMIA TYPE: ICD-10-CM

## 2024-04-18 DIAGNOSIS — K31.819 AVM (ARTERIOVENOUS MALFORMATION) OF DUODENUM, ACQUIRED: ICD-10-CM

## 2024-04-18 DIAGNOSIS — D64.9 ANEMIA, UNSPECIFIED TYPE: Primary | ICD-10-CM

## 2024-04-18 DIAGNOSIS — F17.200 NICOTINE DEPENDENCE, UNCOMPLICATED, UNSPECIFIED NICOTINE PRODUCT TYPE: ICD-10-CM

## 2024-04-18 DIAGNOSIS — I25.10 CORONARY ARTERY DISEASE INVOLVING NATIVE CORONARY ARTERY OF NATIVE HEART WITHOUT ANGINA PECTORIS: ICD-10-CM

## 2024-04-18 DIAGNOSIS — Z86.73 HISTORY OF STROKE: ICD-10-CM

## 2024-04-18 PROCEDURE — 3008F BODY MASS INDEX DOCD: CPT | Performed by: INTERNAL MEDICINE

## 2024-04-18 PROCEDURE — 99214 OFFICE O/P EST MOD 30 MIN: CPT | Performed by: INTERNAL MEDICINE

## 2024-04-18 PROCEDURE — 3077F SYST BP >= 140 MM HG: CPT | Performed by: INTERNAL MEDICINE

## 2024-04-18 PROCEDURE — 3078F DIAST BP <80 MM HG: CPT | Performed by: INTERNAL MEDICINE

## 2024-04-18 PROCEDURE — 1160F RVW MEDS BY RX/DR IN RCRD: CPT | Performed by: INTERNAL MEDICINE

## 2024-04-18 PROCEDURE — 1159F MED LIST DOCD IN RCRD: CPT | Performed by: INTERNAL MEDICINE

## 2024-04-18 RX ORDER — ATORVASTATIN CALCIUM 40 MG/1
40 TABLET, FILM COATED ORAL NIGHTLY
Qty: 30 TABLET | Refills: 0 | Status: SHIPPED | OUTPATIENT
Start: 2024-04-18 | End: 2024-05-18

## 2024-04-18 NOTE — PROGRESS NOTES
Subjective   Patient ID: Denys Denton is a 68 y.o. male who presents for Hospital Follow-up (ER).  HPI    Post ER follow up    Dx anemia symptomatic but no bleeding seen       1 unit PRBC's given        Check labs        Feels better        Follow up GI    s/p symptomatic anemia 2ry to AVM bleed  no bleeding seen    H/o stroke  exercises, aspirin  Neuro and cardio  follow up  EKG ok 12-23     hypothyroidism on rx no side effects     GERD stable on rx no side effects     CAD stable on rx no side effects  follow up cardio / Dr Chapin     hypertension stable on rx no side effects     hypercholesterolemia on rx no side effects     h/o strokes 2013     nicotine cessation discussed  Pt refused    diet / exercise rev'd  Follow weights       Review of Systems   All other systems reviewed and are negative.      Objective   /70   Pulse 67   Wt 56.8 kg (125 lb 3.2 oz)   SpO2 97%   BMI 19.61 kg/m²   Lab Results   Component Value Date    WBC 4.2 (L) 04/15/2024    HGB 7.1 (L) 04/15/2024    HCT 24.2 (L) 04/15/2024     04/15/2024    CHOL 156 12/15/2023    TRIG 51 12/15/2023    HDL 58.0 12/15/2023    ALT 13 04/15/2024    AST 20 04/15/2024     (L) 04/15/2024    K 4.1 04/15/2024    CL 98 04/15/2024    CREATININE 1.42 (H) 04/15/2024    BUN 31 (H) 04/15/2024    CO2 23 04/15/2024    TSH 11.71 (H) 08/30/2022    INR 1.0 04/15/2024    HGBA1C 5.4 12/15/2023           Physical Exam  Vitals reviewed.   Constitutional:       Appearance: Normal appearance. He is normal weight.   HENT:      Head: Normocephalic and atraumatic.      Mouth/Throat:      Pharynx: No posterior oropharyngeal erythema.   Eyes:      General: No scleral icterus.     Conjunctiva/sclera: Conjunctivae normal.      Pupils: Pupils are equal, round, and reactive to light.   Cardiovascular:      Rate and Rhythm: Normal rate and regular rhythm.      Heart sounds: Normal heart sounds.   Pulmonary:      Effort: No respiratory distress.      Breath  sounds: No wheezing.   Abdominal:      General: Abdomen is flat. Bowel sounds are normal. There is no distension.      Palpations: Abdomen is soft. There is no mass.      Tenderness: There is no abdominal tenderness. There is no rebound.   Musculoskeletal:         General: Normal range of motion.      Cervical back: Normal range of motion and neck supple.   Skin:     General: Skin is warm and dry.   Neurological:      General: No focal deficit present.      Mental Status: He is alert and oriented to person, place, and time. Mental status is at baseline.   Psychiatric:         Mood and Affect: Mood normal.         Behavior: Behavior normal.         Thought Content: Thought content normal.         Judgment: Judgment normal.         Problem List Items Addressed This Visit             ICD-10-CM    Anemia - Primary D64.9    Relevant Orders    CBC and Auto Differential    Comprehensive Metabolic Panel    Referral to Gastroenterology    AVM (arteriovenous malformation) of duodenum, acquired K31.819    Relevant Orders    Referral to Gastroenterology    Benign hypertension I10    CAD (coronary artery disease) I25.10    Hyperlipidemia E78.5    Relevant Medications    atorvastatin (Lipitor) 40 mg tablet     Other Visit Diagnoses         Codes    History of stroke     Z86.73    Nicotine dependence, uncomplicated, unspecified nicotine product type     F17.200    Body mass index (BMI) 19.9 or less, adult     Z68.1          Assessment/Plan     Post ER follow up    Dx anemia symptomatic but no bleeding seen       1 unit PRBC's given        Check labs        Feels better        Follow up GI    s/p symptomatic anemia 2ry to AVM bleed  no bleeding seen    H/o stroke  exercises, aspirin  Neuro and cardio  follow up  EKG ok 12-23     hypothyroidism on rx no side effects     GERD stable on rx no side effects     CAD stable on rx no side effects  follow up cardio / Dr Chapin     hypertension stable on rx no side effects      hypercholesterolemia on rx no side effects     h/o strokes 2013     nicotine cessation discussed    diet / exercise rev'd  Follow weights    Prostate n/a  Colonoscopy due 2026  CT chest 7-22 ok  immunizations rev'd  BMI 19.6    Follow up pending

## 2024-04-27 ENCOUNTER — LAB (OUTPATIENT)
Dept: LAB | Facility: LAB | Age: 69
End: 2024-04-27
Payer: MEDICARE

## 2024-04-27 DIAGNOSIS — D64.9 ANEMIA, UNSPECIFIED TYPE: ICD-10-CM

## 2024-04-27 LAB
ALBUMIN SERPL BCP-MCNC: 4.3 G/DL (ref 3.4–5)
ALP SERPL-CCNC: 42 U/L (ref 33–136)
ALT SERPL W P-5'-P-CCNC: 10 U/L (ref 10–52)
ANION GAP SERPL CALC-SCNC: 15 MMOL/L (ref 10–20)
AST SERPL W P-5'-P-CCNC: 18 U/L (ref 9–39)
BASOPHILS # BLD AUTO: 0.06 X10*3/UL (ref 0–0.1)
BASOPHILS NFR BLD AUTO: 1.2 %
BILIRUB SERPL-MCNC: 0.4 MG/DL (ref 0–1.2)
BUN SERPL-MCNC: 20 MG/DL (ref 6–23)
CALCIUM SERPL-MCNC: 9.1 MG/DL (ref 8.6–10.3)
CHLORIDE SERPL-SCNC: 100 MMOL/L (ref 98–107)
CO2 SERPL-SCNC: 19 MMOL/L (ref 21–32)
CREAT SERPL-MCNC: 1.04 MG/DL (ref 0.5–1.3)
EGFRCR SERPLBLD CKD-EPI 2021: 77 ML/MIN/1.73M*2
EOSINOPHIL # BLD AUTO: 0.11 X10*3/UL (ref 0–0.7)
EOSINOPHIL NFR BLD AUTO: 2.2 %
ERYTHROCYTE [DISTWIDTH] IN BLOOD BY AUTOMATED COUNT: 27.6 % (ref 11.5–14.5)
GLUCOSE SERPL-MCNC: 121 MG/DL (ref 74–99)
HCT VFR BLD AUTO: 32.1 % (ref 41–52)
HGB BLD-MCNC: 9.8 G/DL (ref 13.5–17.5)
HYPOCHROMIA BLD QL SMEAR: NORMAL
IMM GRANULOCYTES # BLD AUTO: 0.02 X10*3/UL (ref 0–0.7)
IMM GRANULOCYTES NFR BLD AUTO: 0.4 % (ref 0–0.9)
LYMPHOCYTES # BLD AUTO: 1.1 X10*3/UL (ref 1.2–4.8)
LYMPHOCYTES NFR BLD AUTO: 21.9 %
MCH RBC QN AUTO: 24.9 PG (ref 26–34)
MCHC RBC AUTO-ENTMCNC: 30.5 G/DL (ref 32–36)
MCV RBC AUTO: 82 FL (ref 80–100)
MONOCYTES # BLD AUTO: 0.46 X10*3/UL (ref 0.1–1)
MONOCYTES NFR BLD AUTO: 9.2 %
NEUTROPHILS # BLD AUTO: 3.27 X10*3/UL (ref 1.2–7.7)
NEUTROPHILS NFR BLD AUTO: 65.1 %
NRBC BLD-RTO: 0 /100 WBCS (ref 0–0)
OVALOCYTES BLD QL SMEAR: NORMAL
PLATELET # BLD AUTO: 310 X10*3/UL (ref 150–450)
POTASSIUM SERPL-SCNC: 3.9 MMOL/L (ref 3.5–5.3)
PROT SERPL-MCNC: 7.8 G/DL (ref 6.4–8.2)
RBC # BLD AUTO: 3.94 X10*6/UL (ref 4.5–5.9)
RBC MORPH BLD: NORMAL
SODIUM SERPL-SCNC: 130 MMOL/L (ref 136–145)
WBC # BLD AUTO: 5 X10*3/UL (ref 4.4–11.3)

## 2024-04-27 PROCEDURE — 36415 COLL VENOUS BLD VENIPUNCTURE: CPT

## 2024-09-28 DIAGNOSIS — E78.5 HYPERLIPIDEMIA, UNSPECIFIED HYPERLIPIDEMIA TYPE: ICD-10-CM

## 2024-09-30 RX ORDER — ATORVASTATIN CALCIUM 40 MG/1
40 TABLET, FILM COATED ORAL NIGHTLY
Qty: 15 TABLET | Refills: 0 | Status: SHIPPED | OUTPATIENT
Start: 2024-09-30

## 2025-02-21 NOTE — TELEPHONE ENCOUNTER
Patient would like you to call him personally to go over his labs and what he needs to do, what is going on before he goes to the ED.    149

## 2025-04-01 ENCOUNTER — OFFICE VISIT (OUTPATIENT)
Dept: PRIMARY CARE | Facility: CLINIC | Age: 70
End: 2025-04-01
Payer: MEDICARE

## 2025-04-01 VITALS
BODY MASS INDEX: 20.55 KG/M2 | OXYGEN SATURATION: 100 % | HEART RATE: 62 BPM | WEIGHT: 131.2 LBS | SYSTOLIC BLOOD PRESSURE: 172 MMHG | DIASTOLIC BLOOD PRESSURE: 82 MMHG | TEMPERATURE: 97.7 F

## 2025-04-01 DIAGNOSIS — Z12.5 ENCOUNTER FOR SCREENING FOR MALIGNANT NEOPLASM OF PROSTATE: ICD-10-CM

## 2025-04-01 DIAGNOSIS — Z86.73 HISTORY OF STROKE: ICD-10-CM

## 2025-04-01 DIAGNOSIS — J44.9 CHRONIC OBSTRUCTIVE PULMONARY DISEASE, UNSPECIFIED COPD TYPE (MULTI): ICD-10-CM

## 2025-04-01 DIAGNOSIS — N18.30 STAGE 3 CHRONIC KIDNEY DISEASE, UNSPECIFIED WHETHER STAGE 3A OR 3B CKD (MULTI): ICD-10-CM

## 2025-04-01 DIAGNOSIS — E78.00 HYPERCHOLESTEREMIA: ICD-10-CM

## 2025-04-01 DIAGNOSIS — E55.9 VITAMIN D DEFICIENCY: ICD-10-CM

## 2025-04-01 DIAGNOSIS — D50.9 IRON DEFICIENCY ANEMIA, UNSPECIFIED IRON DEFICIENCY ANEMIA TYPE: ICD-10-CM

## 2025-04-01 DIAGNOSIS — E03.9 HYPOTHYROIDISM, UNSPECIFIED TYPE: ICD-10-CM

## 2025-04-01 DIAGNOSIS — F17.200 NICOTINE DEPENDENCE, UNCOMPLICATED, UNSPECIFIED NICOTINE PRODUCT TYPE: ICD-10-CM

## 2025-04-01 DIAGNOSIS — I25.10 CORONARY ARTERY DISEASE INVOLVING NATIVE CORONARY ARTERY OF NATIVE HEART WITHOUT ANGINA PECTORIS: ICD-10-CM

## 2025-04-01 DIAGNOSIS — I10 BENIGN HYPERTENSION: ICD-10-CM

## 2025-04-01 DIAGNOSIS — R13.10 DYSPHAGIA, UNSPECIFIED TYPE: Primary | ICD-10-CM

## 2025-04-01 PROCEDURE — 3079F DIAST BP 80-89 MM HG: CPT | Performed by: INTERNAL MEDICINE

## 2025-04-01 PROCEDURE — G2211 COMPLEX E/M VISIT ADD ON: HCPCS | Performed by: INTERNAL MEDICINE

## 2025-04-01 PROCEDURE — 99214 OFFICE O/P EST MOD 30 MIN: CPT | Performed by: INTERNAL MEDICINE

## 2025-04-01 PROCEDURE — 3077F SYST BP >= 140 MM HG: CPT | Performed by: INTERNAL MEDICINE

## 2025-04-01 PROCEDURE — 1159F MED LIST DOCD IN RCRD: CPT | Performed by: INTERNAL MEDICINE

## 2025-04-01 PROCEDURE — 1160F RVW MEDS BY RX/DR IN RCRD: CPT | Performed by: INTERNAL MEDICINE

## 2025-04-01 RX ORDER — OMEPRAZOLE 40 MG/1
40 CAPSULE, DELAYED RELEASE ORAL
Qty: 30 CAPSULE | Refills: 0 | Status: SHIPPED | OUTPATIENT
Start: 2025-04-01 | End: 2025-05-01

## 2025-04-01 NOTE — PROGRESS NOTES
Subjective   Patient ID: Denys Denton is a 69 y.o. male who presents for swallowing issues (X few weeks/Liquids, solids, soft food all causing coughing, choking/Feels throat is constricted- esophagus up to throat).  HPI      Last seen 4-24    Same day sick    Trouble swallowing both solid and liquid x 2 weeks  No known trauma  Dysphagia  Start omeprazole 4 mg daily  Check barium swallow  GI consult    h/o anemia 2ry to AVM bleed  1 unit PRBC's given  Didn't follow up with GI  Check labs     H/o stroke  exercises, aspirin  Neuro and cardio  follow up  EKG ok 12-23     hypothyroidism pt stopped rx     GERD stable off rx     CAD stable on rx no side effects  follow up cardio / Dr Chapin     hypertension high on rx no side effects  No meds today  Follow closely     hypercholesterolemia on rx no side effects     h/o strokes 2013     nicotine cessation discussed     diet / exercise rev'd  Follow weights    Review of Systems   All other systems reviewed and are negative.      Objective   /82 Comment: hasn't taken BP med yet today  Pulse 62   Temp 36.5 °C (97.7 °F)   Wt 59.5 kg (131 lb 3.2 oz)   SpO2 100%   BMI 20.55 kg/m²   Lab Results   Component Value Date    WBC 5.0 04/27/2024    HGB 9.8 (L) 04/27/2024    HCT 32.1 (L) 04/27/2024     04/27/2024    CHOL 156 12/15/2023    TRIG 51 12/15/2023    HDL 58.0 12/15/2023    ALT 10 04/27/2024    AST 18 04/27/2024     (L) 04/27/2024    K 3.9 04/27/2024     04/27/2024    CREATININE 1.04 04/27/2024    BUN 20 04/27/2024    CO2 19 (L) 04/27/2024    TSH 11.71 (H) 08/30/2022    INR 1.0 04/15/2024    HGBA1C 5.4 12/15/2023           Physical Exam  Vitals reviewed.   Constitutional:       Appearance: Normal appearance. He is normal weight.   HENT:      Head: Normocephalic and atraumatic.      Mouth/Throat:      Pharynx: No posterior oropharyngeal erythema.   Eyes:      General: No scleral icterus.     Conjunctiva/sclera: Conjunctivae normal.      Pupils:  Pupils are equal, round, and reactive to light.   Cardiovascular:      Rate and Rhythm: Normal rate and regular rhythm.      Heart sounds: Normal heart sounds.   Pulmonary:      Effort: No respiratory distress.      Breath sounds: No wheezing.   Abdominal:      General: Abdomen is flat. Bowel sounds are normal. There is no distension.      Palpations: Abdomen is soft. There is no mass.      Tenderness: There is no abdominal tenderness. There is no rebound.   Musculoskeletal:         General: Normal range of motion.      Cervical back: Normal range of motion and neck supple.   Skin:     General: Skin is warm and dry.   Neurological:      General: No focal deficit present.      Mental Status: He is alert and oriented to person, place, and time. Mental status is at baseline.   Psychiatric:         Mood and Affect: Mood normal.         Behavior: Behavior normal.         Thought Content: Thought content normal.         Judgment: Judgment normal.         Problem List Items Addressed This Visit             ICD-10-CM    Anemia D64.9    Relevant Orders    CBC and Auto Differential    Benign hypertension I10    CAD (coronary artery disease) I25.10    Hypothyroidism E03.9    Relevant Orders    TSH with reflex to Free T4 if abnormal    Vitamin D deficiency E55.9    Relevant Orders    Vitamin D 25-Hydroxy,Total (for eval of Vitamin D levels)    Chronic obstructive pulmonary disease, unspecified COPD type (Multi) J44.9    Stage 3 chronic kidney disease, unspecified whether stage 3a or 3b CKD (Multi) N18.30     Other Visit Diagnoses         Codes    Dysphagia, unspecified type    -  Primary R13.10    Relevant Medications    omeprazole (PriLOSEC) 40 mg DR capsule    Other Relevant Orders    Referral to Gastroenterology    FL modified barium swallow study    History of stroke     Z86.73    Hypercholesteremia     E78.00    Relevant Orders    Comprehensive Metabolic Panel    Lipid Panel    Encounter for screening for malignant  neoplasm of prostate     Z12.5    Relevant Orders    Prostate Specific Antigen, Screen    Nicotine dependence, uncomplicated, unspecified nicotine product type     F17.200    BMI 20.0-20.9, adult     Z68.20          Assessment/Plan   Last seen 4-24    Same day sick    Trouble swallowing both solid and liquid x 2 weeks  No known trauma  Dysphagia  Start omeprazole 4 mg daily  Check barium swallow  GI consult    h/o anemia 2ry to AVM bleed  1 unit PRBC's given  Didn't follow up with GI  Check labs     H/o stroke  exercises, aspirin  Neuro and cardio  follow up  EKG ok 12-23     hypothyroidism pt stopped rx     GERD stable off rx     CAD stable on rx no side effects  follow up cardio / Dr Chapin     hypertension high on rx no side effects  No meds today  Follow closely     hypercholesterolemia on rx no side effects     h/o strokes 2013     nicotine cessation discussed     diet / exercise rev'd  Follow weights    Prostate 2-22  Colonoscopy due 2026  CT chest 7-22 ok  immunizations rev'd  BMI 20.5    Check labs    Follow up 3 weeks / behind on medicare

## 2025-04-02 ENCOUNTER — HOSPITAL ENCOUNTER (OUTPATIENT)
Dept: RADIOLOGY | Facility: HOSPITAL | Age: 70
Discharge: HOME | End: 2025-04-02
Payer: MEDICARE

## 2025-04-02 ENCOUNTER — HOSPITAL ENCOUNTER (EMERGENCY)
Facility: HOSPITAL | Age: 70
Discharge: HOME | End: 2025-04-02
Payer: MEDICARE

## 2025-04-02 VITALS
OXYGEN SATURATION: 100 % | RESPIRATION RATE: 18 BRPM | BODY MASS INDEX: 20.4 KG/M2 | TEMPERATURE: 97.9 F | SYSTOLIC BLOOD PRESSURE: 163 MMHG | HEIGHT: 67 IN | WEIGHT: 130 LBS | HEART RATE: 69 BPM | DIASTOLIC BLOOD PRESSURE: 73 MMHG

## 2025-04-02 DIAGNOSIS — E87.1 HYPONATREMIA: Primary | ICD-10-CM

## 2025-04-02 DIAGNOSIS — R13.10 DYSPHAGIA, UNSPECIFIED TYPE: ICD-10-CM

## 2025-04-02 DIAGNOSIS — R13.14 PHARYNGOESOPHAGEAL DYSPHAGIA: Primary | ICD-10-CM

## 2025-04-02 LAB
25(OH)D3+25(OH)D2 SERPL-MCNC: 11 NG/ML (ref 30–100)
ABO GROUP (TYPE) IN BLOOD: NORMAL
ALBUMIN SERPL BCP-MCNC: 4 G/DL (ref 3.4–5)
ALBUMIN SERPL-MCNC: 3.9 G/DL (ref 3.6–5.1)
ALP SERPL-CCNC: 47 U/L (ref 35–144)
ALP SERPL-CCNC: 50 U/L (ref 33–136)
ALT SERPL W P-5'-P-CCNC: 13 U/L (ref 10–52)
ALT SERPL-CCNC: 13 U/L (ref 9–46)
ANION GAP SERPL CALC-SCNC: 14 MMOL/L (ref 10–20)
ANION GAP SERPL CALCULATED.4IONS-SCNC: 6 MMOL/L (CALC) (ref 7–17)
ANTIBODY SCREEN: NORMAL
AST SERPL W P-5'-P-CCNC: 28 U/L (ref 9–39)
AST SERPL-CCNC: 29 U/L (ref 10–35)
BASOPHILS # BLD AUTO: 0.05 X10*3/UL (ref 0–0.1)
BASOPHILS # BLD AUTO: 41 CELLS/UL (ref 0–200)
BASOPHILS NFR BLD AUTO: 0.8 %
BASOPHILS NFR BLD AUTO: 1 %
BILIRUB SERPL-MCNC: 0.3 MG/DL (ref 0.2–1.2)
BILIRUB SERPL-MCNC: 0.3 MG/DL (ref 0–1.2)
BUN SERPL-MCNC: 15 MG/DL (ref 6–23)
BUN SERPL-MCNC: 15 MG/DL (ref 7–25)
CALCIUM SERPL-MCNC: 8.6 MG/DL (ref 8.6–10.3)
CALCIUM SERPL-MCNC: 8.8 MG/DL (ref 8.6–10.3)
CHLORIDE SERPL-SCNC: 103 MMOL/L (ref 98–110)
CHLORIDE SERPL-SCNC: 96 MMOL/L (ref 98–107)
CHOLEST SERPL-MCNC: 166 MG/DL
CHOLEST/HDLC SERPL: 2.6 (CALC)
CO2 SERPL-SCNC: 21 MMOL/L (ref 21–32)
CO2 SERPL-SCNC: 24 MMOL/L (ref 20–32)
CREAT SERPL-MCNC: 0.85 MG/DL (ref 0.5–1.3)
CREAT SERPL-MCNC: 0.91 MG/DL (ref 0.7–1.28)
EGFRCR SERPLBLD CKD-EPI 2021: 90 ML/MIN/1.73M2
EGFRCR SERPLBLD CKD-EPI 2021: >90 ML/MIN/1.73M*2
EOSINOPHIL # BLD AUTO: 0.11 X10*3/UL (ref 0–0.4)
EOSINOPHIL # BLD AUTO: 71 CELLS/UL (ref 15–500)
EOSINOPHIL NFR BLD AUTO: 1.4 %
EOSINOPHIL NFR BLD AUTO: 2.1 %
ERYTHROCYTE [DISTWIDTH] IN BLOOD BY AUTOMATED COUNT: 19 % (ref 11–15)
ERYTHROCYTE [DISTWIDTH] IN BLOOD BY AUTOMATED COUNT: 20.9 % (ref 11.5–14.5)
GLUCOSE SERPL-MCNC: 88 MG/DL (ref 65–99)
GLUCOSE SERPL-MCNC: 90 MG/DL (ref 74–99)
HCT VFR BLD AUTO: 23.9 % (ref 38.5–50)
HCT VFR BLD AUTO: 27.6 % (ref 41–52)
HDLC SERPL-MCNC: 65 MG/DL
HGB BLD-MCNC: 7.3 G/DL (ref 13.2–17.1)
HGB BLD-MCNC: 8.5 G/DL (ref 13.5–17.5)
HYPOCHROMIA BLD QL SMEAR: NORMAL
IMM GRANULOCYTES # BLD AUTO: 0.02 X10*3/UL (ref 0–0.5)
IMM GRANULOCYTES NFR BLD AUTO: 0.4 % (ref 0–0.9)
LDLC SERPL CALC-MCNC: 86 MG/DL (CALC)
LYMPHOCYTES # BLD AUTO: 0.84 X10*3/UL (ref 0.8–3)
LYMPHOCYTES # BLD AUTO: 1040 CELLS/UL (ref 850–3900)
LYMPHOCYTES NFR BLD AUTO: 16.1 %
LYMPHOCYTES NFR BLD AUTO: 20.4 %
MCH RBC QN AUTO: 27 PG (ref 27–33)
MCH RBC QN AUTO: 27.2 PG (ref 26–34)
MCHC RBC AUTO-ENTMCNC: 30.5 G/DL (ref 32–36)
MCHC RBC AUTO-ENTMCNC: 30.8 G/DL (ref 32–36)
MCV RBC AUTO: 88 FL (ref 80–100)
MCV RBC AUTO: 88.5 FL (ref 80–100)
MONOCYTES # BLD AUTO: 0.36 X10*3/UL (ref 0.05–0.8)
MONOCYTES # BLD AUTO: 372 CELLS/UL (ref 200–950)
MONOCYTES NFR BLD AUTO: 6.9 %
MONOCYTES NFR BLD AUTO: 7.3 %
NEUTROPHILS # BLD AUTO: 3.83 X10*3/UL (ref 1.6–5.5)
NEUTROPHILS # BLD AUTO: 3575 CELLS/UL (ref 1500–7800)
NEUTROPHILS NFR BLD AUTO: 70.1 %
NEUTROPHILS NFR BLD AUTO: 73.5 %
NONHDLC SERPL-MCNC: 101 MG/DL (CALC)
NRBC BLD-RTO: 0 /100 WBCS (ref 0–0)
OVALOCYTES BLD QL SMEAR: NORMAL
PLATELET # BLD AUTO: 330 X10*3/UL (ref 150–450)
PLATELET # BLD AUTO: 392 THOUSAND/UL (ref 140–400)
PMV BLD REES-ECKER: 8.5 FL (ref 7.5–12.5)
POTASSIUM SERPL-SCNC: 3.9 MMOL/L (ref 3.5–5.3)
POTASSIUM SERPL-SCNC: 4.2 MMOL/L (ref 3.5–5.3)
PROT SERPL-MCNC: 8.5 G/DL (ref 6.1–8.1)
PROT SERPL-MCNC: 8.6 G/DL (ref 6.4–8.2)
PSA SERPL-MCNC: 0.34 NG/ML
RBC # BLD AUTO: 2.7 MILLION/UL (ref 4.2–5.8)
RBC # BLD AUTO: 3.13 X10*6/UL (ref 4.5–5.9)
RBC MORPH BLD: NORMAL
RH FACTOR (ANTIGEN D): NORMAL
SODIUM SERPL-SCNC: 127 MMOL/L (ref 136–145)
SODIUM SERPL-SCNC: 133 MMOL/L (ref 135–146)
T4 FREE SERPL-MCNC: 0.2 NG/DL (ref 0.8–1.8)
TARGETS BLD QL SMEAR: NORMAL
TRIGL SERPL-MCNC: 68 MG/DL
TSH SERPL-ACNC: >150 MIU/L (ref 0.4–4.5)
WBC # BLD AUTO: 5.1 THOUSAND/UL (ref 3.8–10.8)
WBC # BLD AUTO: 5.2 X10*3/UL (ref 4.4–11.3)

## 2025-04-02 PROCEDURE — 86900 BLOOD TYPING SEROLOGIC ABO: CPT | Performed by: PHYSICIAN ASSISTANT

## 2025-04-02 PROCEDURE — 99283 EMERGENCY DEPT VISIT LOW MDM: CPT

## 2025-04-02 PROCEDURE — 36415 COLL VENOUS BLD VENIPUNCTURE: CPT | Performed by: PHYSICIAN ASSISTANT

## 2025-04-02 PROCEDURE — 80053 COMPREHEN METABOLIC PANEL: CPT | Performed by: PHYSICIAN ASSISTANT

## 2025-04-02 PROCEDURE — 85025 COMPLETE CBC W/AUTO DIFF WBC: CPT | Performed by: PHYSICIAN ASSISTANT

## 2025-04-02 PROCEDURE — 74230 X-RAY XM SWLNG FUNCJ C+: CPT

## 2025-04-02 PROCEDURE — 86901 BLOOD TYPING SEROLOGIC RH(D): CPT | Performed by: PHYSICIAN ASSISTANT

## 2025-04-02 PROCEDURE — 74230 X-RAY XM SWLNG FUNCJ C+: CPT | Performed by: STUDENT IN AN ORGANIZED HEALTH CARE EDUCATION/TRAINING PROGRAM

## 2025-04-02 PROCEDURE — 2500000005 HC RX 250 GENERAL PHARMACY W/O HCPCS: Performed by: INTERNAL MEDICINE

## 2025-04-02 PROCEDURE — 92611 MOTION FLUOROSCOPY/SWALLOW: CPT | Mod: GN | Performed by: PHARMACIST

## 2025-04-02 RX ADMIN — BARIUM SULFATE 5 ML: 400 SUSPENSION ORAL at 14:05

## 2025-04-02 RX ADMIN — BARIUM SULFATE 90 ML: 0.81 POWDER, FOR SUSPENSION ORAL at 14:06

## 2025-04-02 RX ADMIN — BARIUM SULFATE 15 ML: 400 PASTE ORAL at 14:06

## 2025-04-02 RX ADMIN — BARIUM SULFATE 700 MG: 700 TABLET ORAL at 14:06

## 2025-04-02 RX ADMIN — BARIUM SULFATE 70 ML: 400 SUSPENSION ORAL at 14:05

## 2025-04-02 ASSESSMENT — PAIN - FUNCTIONAL ASSESSMENT: PAIN_FUNCTIONAL_ASSESSMENT: 0-10

## 2025-04-02 ASSESSMENT — PAIN SCALES - GENERAL
PAINLEVEL_OUTOF10: 0 - NO PAIN
PAINLEVEL_OUTOF10: 0 - NO PAIN

## 2025-04-02 NOTE — RESULT ENCOUNTER NOTE
Please call the patient regarding his abnormal result.  Severe anemia  Severe hypothyroidism  Critically low vit D  To [place] [facility] ASAP

## 2025-04-02 NOTE — ED TRIAGE NOTES
Pt presents ambulatory via POV through triage from home for c/o abnormal lab work. Pt states he had routine labs done yesterday that showed amenia, hypothyroid, and vitamin D deficiency. Pt denies any pain, states is SOB when ambulating long distances. Call light within reach.

## 2025-04-02 NOTE — ED PROVIDER NOTES
HPI   Chief Complaint   Patient presents with    Abnormal labs       Patient is coming in from abnormal labs on outpatient workup.  He was found to have anemia.  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  Patient states that he feels normal and has no complaints.  He occasionally has shortness of breath going up stairs but states it is no different and he does not state that it is worrisome to himself.  He denies any vomiting or diarrhea.  No dark stools.  Patient was also found to have low vitamin D.  He denies any other complaints or issues however states that he is being evaluated for feeling of difficulty swallowing occasionally but it is not an acute issue.  He has an outpatient appointment scheduled for 130 today.      History provided by:  Patient          Patient History   Past Medical History:   Diagnosis Date    Personal history of other diseases of the circulatory system     History of hypertension    Personal history of other specified conditions 08/30/2022    History of chest pain    Personal history of transient ischemic attack (TIA), and cerebral infarction without residual deficits 05/05/2021    History of cerebrovascular accident    Pure hypercholesterolemia, unspecified 08/30/2022    Hypercholesterolemia     Past Surgical History:   Procedure Laterality Date    COLOSTOMY  05/08/2014    Colostomy    KNEE SURGERY  05/08/2014    Knee Surgery    MR HEAD ANGIO WO IV CONTRAST  12/15/2023    MR HEAD ANGIO WO IV CONTRAST 12/15/2023 GEN MRI    MR NECK ANGIO WO IV CONTRAST  12/15/2023    MR NECK ANGIO WO IV CONTRAST 12/15/2023 GEN MRI    OTHER SURGICAL HISTORY  05/05/2021    Carpal tunnel surgery    OTHER SURGICAL HISTORY  05/05/2021    Sinus surgery    OTHER SURGICAL HISTORY  06/23/2021    Complete colonoscopy    OTHER SURGICAL HISTORY  06/17/2014    Carotid Artery Catheterization     No family history on file.  Social History     Tobacco Use    Smoking status: Every Day     Current packs/day:  1.00     Types: Cigarettes     Passive exposure: Current    Smokeless tobacco: Never   Vaping Use    Vaping status: Never Used   Substance Use Topics    Alcohol use: Yes     Alcohol/week: 28.0 standard drinks of alcohol     Types: 28 Cans of beer per week     Comment: daily colleen drinker 2-3 x many years    Drug use: Never       Physical Exam   ED Triage Vitals [04/02/25 1120]   Temperature Heart Rate Respirations BP   36.6 °C (97.9 °F) 69 18 156/81      Pulse Ox Temp src Heart Rate Source Patient Position   100 % -- -- --      BP Location FiO2 (%)     -- --       Physical Exam  Vitals and nursing note reviewed.   Constitutional:       General: He is not in acute distress.     Appearance: Normal appearance. He is not toxic-appearing.   HENT:      Head: Normocephalic and atraumatic.      Nose: Nose normal.      Mouth/Throat:      Mouth: Mucous membranes are moist.      Pharynx: Oropharynx is clear.   Eyes:      Extraocular Movements: Extraocular movements intact.      Conjunctiva/sclera: Conjunctivae normal.      Pupils: Pupils are equal, round, and reactive to light.   Cardiovascular:      Rate and Rhythm: Normal rate and regular rhythm.      Pulses: Normal pulses.      Heart sounds: Normal heart sounds.   Pulmonary:      Effort: Pulmonary effort is normal. No respiratory distress.      Breath sounds: Normal breath sounds.   Abdominal:      General: Abdomen is flat. Bowel sounds are normal.      Palpations: Abdomen is soft.      Tenderness: There is no abdominal tenderness.   Musculoskeletal:         General: Normal range of motion.      Cervical back: Normal range of motion and neck supple.   Skin:     General: Skin is warm and dry.      Coloration: Skin is not jaundiced or pale.      Findings: No bruising.   Neurological:      General: No focal deficit present.      Mental Status: He is alert and oriented to person, place, and time. Mental status is at baseline.   Psychiatric:         Mood and Affect: Mood normal.          Behavior: Behavior normal.           ED Course & MDM   Diagnoses as of 04/02/25 1211   Hyponatremia                 No data recorded     Vaishnavi Coma Scale Score: 15 (04/02/25 1128 : Ruddy Rasmussen RN)                           Medical Decision Making  Summary:  Medical Decision Making:   Patient presented as described in HPI. Patient case including ROS, PE, and treatment and plan discussed with ED attending if attached as cosigner. Results from labs and or imaging included below if completed. Denys Denton  is a 69 y.o. coming in for Patient presents with:  Abnormal labs  .  Patient does not appear toxic or ill.  He has no complaints.  Due to patient's complaint lab work was reassessed.  Patient's hemoglobin is now greater than 8.  He does have slight hyponatremia.  He is advised increase his salt intake.  He denies any weakness or any other symptoms and reports no acute symptoms.  He will follow-up closely with his PCP regarding his vitamin D deficiency and continue his outpatient workup for his dysphagia.      Disposition is completed with shared decision making with the patient or guardian present with the patient. They were advised to follow up with PCP or recommended provider in 2-3 days for another evaluation and exam. I advised the patient to return or go to closest emergency room immediately if symptoms change, get worse, or new symptoms develop prior to follow up. I explained the plan and treatment course. Patient/guardian is in agreement with plan, treatment course, and follow up and state that they will comply.    Labs Reviewed  CBC WITH AUTO DIFFERENTIAL - Abnormal     WBC                           5.2                    nRBC                          0.0                    RBC                           3.13 (*)               Hemoglobin                    8.5 (*)                Hematocrit                    27.6 (*)               MCV                           88                     MCH                            27.2                   MCHC                          30.8 (*)               RDW                           20.9 (*)               Platelets                     330                    Neutrophils %                 73.5                   Immature Granulocytes %, Automated   0.4                    Lymphocytes %                 16.1                   Monocytes %                   6.9                    Eosinophils %                 2.1                    Basophils %                   1.0                    Neutrophils Absolute          3.83                   Immature Granulocytes Absolute, Au*   0.02                   Lymphocytes Absolute          0.84                   Monocytes Absolute            0.36                   Eosinophils Absolute          0.11                   Basophils Absolute            0.05                COMPREHENSIVE METABOLIC PANEL - Abnormal     Glucose                       90                     Sodium                        127 (*)                Potassium                     3.9                    Chloride                      96 (*)                 Bicarbonate                   21                     Anion Gap                     14                     Urea Nitrogen                 15                     Creatinine                    0.85                   eGFR                          >90                    Calcium                       8.8                    Albumin                       4.0                    Alkaline Phosphatase          50                     Total Protein                 8.6 (*)                AST                           28                     Bilirubin, Total              0.3                    ALT                           13                  TYPE AND SCREEN     ABO TYPE                      A                      Rh TYPE                       POS                 MORPHOLOGY   No orders to display                         Tests/Medications/Escalations of  Care considered but not given: As in MDM    Patient care discussed with: N/A  Social Determinants affecting care: N/A    Final diagnosis and disposition as documented     Diagnoses as of 04/02/25 1218  Hyponatremia       Shared decision making was completed and determined that patient will be discharged. I discussed the differential; results and discharge plan with the patient and/or family/friend/caregiver if present.  I emphasized the importance of follow-up with the physician I referred them to in the timeframe recommended.  I explained reasons for the patient to return to the Emergency Department. They agreed that if they feel their condition is worsening or if they have any other concern they should call 911 immediately for further assistance. I gave the patient an opportunity to ask all questions they had and answered all of them accordingly. They understand return precautions and discharge instructions. The patient and/or family/friend/caregiver expressed understanding verbally and that they would comply.     Disposition: Discharge      This note has been transcribed using voice recognition and may contain grammatical errors, misplaced words, incorrect words, incorrect phrases or other errors.         Procedure  Procedures     René Cutler PA-C  04/02/25 1213

## 2025-04-03 ENCOUNTER — HOSPITAL ENCOUNTER (EMERGENCY)
Facility: HOSPITAL | Age: 70
Discharge: HOME | End: 2025-04-03
Attending: EMERGENCY MEDICINE
Payer: MEDICARE

## 2025-04-03 VITALS
TEMPERATURE: 96.8 F | HEART RATE: 64 BPM | SYSTOLIC BLOOD PRESSURE: 138 MMHG | DIASTOLIC BLOOD PRESSURE: 74 MMHG | BODY MASS INDEX: 20.4 KG/M2 | RESPIRATION RATE: 15 BRPM | HEIGHT: 67 IN | OXYGEN SATURATION: 99 % | WEIGHT: 130 LBS

## 2025-04-03 DIAGNOSIS — E03.9 HYPOTHYROIDISM, UNSPECIFIED TYPE: Primary | ICD-10-CM

## 2025-04-03 DIAGNOSIS — R13.10 DYSPHAGIA, UNSPECIFIED TYPE: ICD-10-CM

## 2025-04-03 PROBLEM — R13.14 PHARYNGOESOPHAGEAL DYSPHAGIA: Status: ACTIVE | Noted: 2025-04-03

## 2025-04-03 PROBLEM — R13.14 PHARYNGOESOPHAGEAL DYSPHAGIA: Status: RESOLVED | Noted: 2025-04-03 | Resolved: 2025-04-03

## 2025-04-03 PROCEDURE — 99282 EMERGENCY DEPT VISIT SF MDM: CPT | Performed by: EMERGENCY MEDICINE

## 2025-04-03 PROCEDURE — 99283 EMERGENCY DEPT VISIT LOW MDM: CPT

## 2025-04-03 RX ORDER — LEVOTHYROXINE SODIUM 50 UG/1
50 TABLET ORAL DAILY
Qty: 30 TABLET | Refills: 0 | Status: SHIPPED | OUTPATIENT
Start: 2025-04-03 | End: 2025-05-03

## 2025-04-03 ASSESSMENT — PAIN SCALES - GENERAL
PAINLEVEL_OUTOF10: 0 - NO PAIN

## 2025-04-03 ASSESSMENT — LIFESTYLE VARIABLES
EVER FELT BAD OR GUILTY ABOUT YOUR DRINKING: NO
HAVE PEOPLE ANNOYED YOU BY CRITICIZING YOUR DRINKING: NO
TOTAL SCORE: 0
HAVE YOU EVER FELT YOU SHOULD CUT DOWN ON YOUR DRINKING: NO
EVER HAD A DRINK FIRST THING IN THE MORNING TO STEADY YOUR NERVES TO GET RID OF A HANGOVER: NO

## 2025-04-03 ASSESSMENT — COLUMBIA-SUICIDE SEVERITY RATING SCALE - C-SSRS
2. HAVE YOU ACTUALLY HAD ANY THOUGHTS OF KILLING YOURSELF?: NO
6. HAVE YOU EVER DONE ANYTHING, STARTED TO DO ANYTHING, OR PREPARED TO DO ANYTHING TO END YOUR LIFE?: NO
1. IN THE PAST MONTH, HAVE YOU WISHED YOU WERE DEAD OR WISHED YOU COULD GO TO SLEEP AND NOT WAKE UP?: NO

## 2025-04-03 ASSESSMENT — PAIN - FUNCTIONAL ASSESSMENT
PAIN_FUNCTIONAL_ASSESSMENT: 0-10
PAIN_FUNCTIONAL_ASSESSMENT: 0-10

## 2025-04-03 NOTE — ED PROVIDER NOTES
HPI   Chief Complaint   Patient presents with    Abnormal Labs     Pt sent in by PCP yesterday for abnormal labs. Provider c/f low hemoglobin, hyponatremia, and elevated TSH. Pt had repeat blood work here that showed an increase in hemoglobin and was discharged home and told to follow up with primary. Per pt, he was contacted by primary this morning and told to come back due to worsening hyponatremia and to have his TSH addressed.        HPI  70 yo M with a PMH of hypothyroidism, CVA, htn, and duodenal AV malformaiton presenting after being contacted by his PCP for abnormal labs. Patient also presented here yesterday with concern for severe anemia, after he was called by his PCP. The office called him this morning and told him he has to go in for treatment as his thyroid is basically not working. He does state that he stopped taking his synthroid about a year ago as he became aggravated by having to change the dose multiple times. He denies any new symptoms, but states that he has chronic fatigue which is unchanged. He also adds that he has difficulty swallowing for a few weeks, with no recent changes. Yesterday he had a swallow study done, which was ordered by his PCP and other work-up which has been started outpatient. He denies weight loss. He denies fevers/chills, chest pain, palpitations, abdominal pain, n/v, diarrhea/constipation, lower extremity swelling, numbness/tingling, lightheadedness, dizziness, temperature sensitivity, muscular pain.       Patient History   Past Medical History:   Diagnosis Date    Personal history of other diseases of the circulatory system     History of hypertension    Personal history of other specified conditions 08/30/2022    History of chest pain    Personal history of transient ischemic attack (TIA), and cerebral infarction without residual deficits 05/05/2021    History of cerebrovascular accident    Pure hypercholesterolemia, unspecified 08/30/2022    Hypercholesterolemia      Past Surgical History:   Procedure Laterality Date    COLOSTOMY  05/08/2014    Colostomy    KNEE SURGERY  05/08/2014    Knee Surgery    MR HEAD ANGIO WO IV CONTRAST  12/15/2023    MR HEAD ANGIO WO IV CONTRAST 12/15/2023 GEN MRI    MR NECK ANGIO WO IV CONTRAST  12/15/2023    MR NECK ANGIO WO IV CONTRAST 12/15/2023 GEN MRI    OTHER SURGICAL HISTORY  05/05/2021    Carpal tunnel surgery    OTHER SURGICAL HISTORY  05/05/2021    Sinus surgery    OTHER SURGICAL HISTORY  06/23/2021    Complete colonoscopy    OTHER SURGICAL HISTORY  06/17/2014    Carotid Artery Catheterization     No family history on file.  Social History     Tobacco Use    Smoking status: Every Day     Current packs/day: 1.00     Types: Cigarettes     Passive exposure: Current    Smokeless tobacco: Never   Vaping Use    Vaping status: Never Used   Substance Use Topics    Alcohol use: Yes     Alcohol/week: 28.0 standard drinks of alcohol     Types: 28 Cans of beer per week     Comment: daily colleen drinker 2-3 x many years    Drug use: Never       Physical Exam   ED Triage Vitals [04/03/25 1139]   Temperature Heart Rate Respirations BP   36 °C (96.8 °F) 62 17 155/75      Pulse Ox Temp Source Heart Rate Source Patient Position   97 % Temporal Monitor Sitting      BP Location FiO2 (%)     Left arm --       Physical Exam  General:  Well developed. Alert. No acute distress.  Skin:  Warm, dry. normal skin turgor. no rashes. no lesions.   Head: NCAT  EENT: MMM  Cardiovascular:  Regular rate and rhythm, normal S1 and S2, no gallops, no murmurs and no pericardial rub.  Pulmonary:  CTAB in all fields  Abdomen:  (+) BS. soft. non-tender. non-distended. no abdominal masses. no guarding or rigidity.  Neurologic:  grossly intact  Musculoskeletal: moves all extremities spontaneously  Psychiatric:  appropriate mood and affect    ED Course & MDM   ED Course as of 04/03/25 1303   Thu Apr 03, 2025   1213 Emergency Medicine Senior Resident Attestation:   Patient is a  69-year-old male with history of hypertension, anemia, vitamin D deficiency, COPD, CKD, hypothyroidism, hypertension who presents with abnormal lab results.  Patient was sent in by his primary care physician due to abnormal lab results in the outpatient setting.  Per chart review, sodium is chronically sodium 135, has been approximately 130 in the past.  Labs were done yesterday March 2, 2025 and it was 127.  Patient has a TSH of greater than 150 from April 1, 2025 with free T4 of 0.2.  Patient does not appear to be on Synthroid.   Hemoglobin is 8.5 from yesterday April 2, 2025 from baseline of approximately 10 however his hemoglobin was last monitored a year ago on April 27, 2024 when it was 9.8 and on April 15, 2024 this was 7.1.  He states he has a history of anemia secondary to a septal defect and is intestines that he is having outpatient monitoring of.  Denies any nausea, vomiting, hematochezia, melena, hematemesis, hemoptysis or any overt bleeding.  Denies any abdominal pain, chest pain, syncopal events, increased fatigue, lightheadedness, dizziness concerning for symptomatic anemia.  He is overall well-appearing, has no pretibial edema, exophthalmos, no obvious goiter or neck mass palpated.  He is protecting his airway, has no trismus, denies significant dysphagia at this time and is able to tolerate liquids and solids.  I do not believe further labs or imaging is needed at this time.  Will discuss with endocrinology, patient's primary care physician Dr. Garza for management.  I do not have any acute indication to admit patient at this time.  Does not appear to be in endocrinologic emergency such as myxedema coma. Overall asymptomatic anemia and hyponatremia.    Shruthi Friedman PGY-3, DO   [SA]      ED Course User Index  [SA] Shruthi Friedman DO         Diagnoses as of 04/03/25 1303   Hypothyroidism, unspecified type   Dysphagia, unspecified type               No data recorded                          Medical Decision Making  70 yo M with a PMH of CAD and hypothyroidism presenting for hypothyroidism. TSH > 150, however he has no significant symptoms, including chest pain. HDS with unremarkable exam, and no evidence to suggest myxedema. He has no sick symptoms to suspect for abnormal TSH levels, and although he has a hx of CAD, this is stable. Discussed with patient's PCP and endocrinology. Will re-initiate levothyroxine at 50 mcg daily, and have him follow-up with his PCP and endocrinology. Regarding the dysphagia, this is an ongoing issue. There is no goiter or significant thyroid enlargement on exam, however this may also be contributing. Outpatient work-up already initiated, and patient has been evaluated by SLP with swallow study. Will refer to GI for further evaluation. This was discussed with the patient, who was agreeable and understanding of the plan. Return precautions discussed.     Riaz Pereira MD  Family Medicine Resident.     Riaz Pereira MD  Resident  04/03/25 2368

## 2025-04-03 NOTE — PROCEDURES
"Speech-Language Pathology    Outpatient Modified Barium Swallow Study    Patient Name: Denys Denton  MRN: [id number]  : 1955  Today's Date: 25  Time Calculation  Start Time: 1340  Stop Time: 1410  Time Calculation (min): 30 min        *See joint MBSS report under \"Imaging\" tab for complete comments from SLP and radiologist.*    Modified Barium Swallow Study completed. Informed verbal consent obtained prior to completion of exam. The study was completed per protocol with various liquid barium consistencies, pudding, solids and a 13mm barium tablet. A 1.9 cm or .75 inch (outer diameter) ring was placed on the chin in the lateral view and on the lateral, left side of the neck in the a-p view in order to complete objective measurements during swallowing. The anatomic structures and function of the oropharynx, larynx, hypopharynx and cervical esophagus were evaluated.    SLP: Cristiana Larsen SLP   Contact info: Available via secure chat      Reason for Referral: Pt reports dysphagia to liquids, solids, and pills for the last 2-3 weeks characterized by intermittent sensation of stasis in region of the upper/mid-chest and coughing with PO intake. Pt denies any dysphagia symptoms prior to 3 weeks ago. Pt states that he also began having difficulty breathing while sleeping 2-3 weeks ago. Pt does have a hx of GERD and was recommended to start an anti-acid mediation 2 days ago but has not started it yet. Pt denies heartburn.  Patient Hx: Abnormal weight loss, anemia, CAD, CHASE, fatigue, GERD, hypothyroidism, COPD, TIA, CVA. Pt was seen in the ED earlier this date for anemia but was discharged.  Respiratory Status: Room air  Current diet: Regular/thin    Pain:  Pain Scale: 0-10  Ratin      FINAL SPEECH RECOMMENDATIONS    DIET:   - Regular (IDDSI Level 7)  - Thin liquids (IDDSI Level 0)  - Pill whole in thin water vs puree, one at a time    STRATEGIES:  - Upright positioning for all PO intake  - Slow rate " of intake  - Small bites  - Small sips  - One bite/sip at a time  - Head in neutral position while swallowing    SLP PLAN:  Skilled SLP Services: No further skilled SLP intervention is warranted for dysphagia at this time; if pt's symptoms are not resolved after GI consult/treatment, could consider trial of SLP services at that time.    Discussed POC: Patient  Discussed Risks/Benefits: Yes  Patient/Caregiver Agreeable: Yes    Education Provided: Pt viewed flouro images while SLP educated re: swallow anatomy/physiology, results of assessment, risk for aspiration, and recommended safe swallow strategies. Pt verbalized understanding of information.    Treatment Provided Today: N/A    Additional Medical Consults Suggested:   - Agree with consult to GI    Repeat study/ dc plan: No repeat recommended at this time; repeat MBSS as needed if symptoms of oropharyngeal dysphagia worsen.       Mechanics of the Swallow Summary:  ORAL PHASE:  Lip Closure - Escape progressing to mid-chin   Tongue Control During Bolus Hold - Cohesive bolus between tongue to palatal seal   Bolus prep/mastication - Timely and efficient mastication skills   Bolus transport/lingual motion - Slowed Tongue Motion for A-P movement of the bolus   Oral residue - Residue collection on oral structure (mild; clearing with spontaneous re-swallows)    PHARYNGEAL PHASE:  Initiation of pharyngeal swallow - Collection of bolus at the level of the valleculae   Soft palate elevation - No bolus between soft palate/pharyngeal wall   Laryngeal elevation - Complete superior movement of thyroid cartilage with contact of arytenoids to epiglottic petiole   Anterior hyoid excursion - Complete anterior movement   Epiglottic movement - Complete inversion    Laryngeal vestibule closure - Complete - no air/contrast in laryngeal vestibule   Pharyngeal stripping wave - Complete  Pharyngeal contraction (A/P view) - Complete  Pharyngoesophageal segment opening - Partial  distension/partial duration with partial obstruction of flow of bolus   Tongue base retraction - Trace column of contrast or air between tongue base and pharyngeal wall   Pharyngeal residue - Trace residue within or on the pharyngeal structures     ESOPHAGEAL PHASE:  Esophageal clearance - Complete clearance       SLP Impressions with Severity Rating:   Pt presents with minimal oropharyngeal dysphagia upon completion of modified barium swallow study this date. Swallowing physiology is detailed above. Impairments most impacting swallowing safety and efficiency include mildly delayed A/P bolus transit and decreased distention of UES during the swallow. CP appears to create a mass effect on the bolus. This did not significantly impact bolus clearance during the study. Patient demonstrated no penetration and no aspiration of contrast; however, note that while attempting to swallow barium tablet with water, pt demonstrated immediate cough response suspicious for aspiration. Pt ultimately swallowed the tablet whole in applesauce due to difficulty clearing tablet into pharynx with water.    A/P esophageal sweep revealed prompt clearance of liquids and pudding through the esophagus. Tablet retained near the level of the UES for several seconds before clearing into the stomach. *Of note: The A-P bolus follow-through is not intended to be utilized as a diagnostic assessment of the esophagus, rather a tool to observe the biomechanical aspects of the swallow continuum and to inform the need for further evaluation by medical specialists, as applicable.       OUTCOME MEASURES:  Functional Oral Intake Scale  Functional Oral Intake Scale: Level 7        total oral diet with no restrictions       Rosenbek's Penetration Aspiration Scale  Thin Liquids: 1. NO ASPIRATION & NO PENETRATION - no aspiration, contrast does not enter airway  [place] Thick Liquids: 1. NO ASPIRATION & NO PENETRATION - no aspiration, contrast does not enter  airway  Puree: 1. NO ASPIRATION & NO PENETRATION - no aspiration, contrast does not enter airway  Solids: 1. NO ASPIRATION & NO PENETRATION - no aspiration, contrast does not enter airway

## 2025-04-04 ENCOUNTER — PATIENT OUTREACH (OUTPATIENT)
Dept: CARE COORDINATION | Facility: CLINIC | Age: 70
End: 2025-04-04
Payer: MEDICARE

## 2025-04-04 NOTE — PROGRESS NOTES
[facility]  ED Visit 4/2/2025  C/O: Abnormal labs on outpatient workup - sent by PCP   Dx: Hyponatremia, Anemia, low vitamin D  --------------------------------------------------------------------  [facility]  ED Visit 4/3/2025  C/O:  Abnormal labs on outpatient workup - sent by PCP  Dx: Hypothyroidism    Started on Levothyroxine    Outreach call to patient to support a smooth transition of care from recent admission. Left voicemail message with CM name and contact number.     Nedra Mojica RN/CM  [facility]  [phone]

## 2025-04-28 ENCOUNTER — APPOINTMENT (OUTPATIENT)
Dept: RADIOLOGY | Facility: HOSPITAL | Age: 70
End: 2025-04-28
Payer: MEDICARE

## 2025-06-03 ENCOUNTER — APPOINTMENT (OUTPATIENT)
Dept: RADIOLOGY | Facility: HOSPITAL | Age: 70
End: 2025-06-03
Payer: MEDICARE

## 2025-06-03 ENCOUNTER — APPOINTMENT (OUTPATIENT)
Dept: CARDIOLOGY | Facility: HOSPITAL | Age: 70
End: 2025-06-03
Payer: MEDICARE

## 2025-06-03 ENCOUNTER — HOSPITAL ENCOUNTER (EMERGENCY)
Facility: HOSPITAL | Age: 70
Discharge: HOME | End: 2025-06-04
Attending: STUDENT IN AN ORGANIZED HEALTH CARE EDUCATION/TRAINING PROGRAM
Payer: MEDICARE

## 2025-06-03 DIAGNOSIS — D64.9 ANEMIA, UNSPECIFIED TYPE: Primary | ICD-10-CM

## 2025-06-03 DIAGNOSIS — N28.89 RENAL MASS: ICD-10-CM

## 2025-06-03 DIAGNOSIS — K56.2 VOLVULUS (MULTI): ICD-10-CM

## 2025-06-03 DIAGNOSIS — R06.02 SHORTNESS OF BREATH: ICD-10-CM

## 2025-06-03 LAB
ABO GROUP (TYPE) IN BLOOD: NORMAL
ALBUMIN SERPL BCP-MCNC: 3.7 G/DL (ref 3.4–5)
ALP SERPL-CCNC: 61 U/L (ref 33–136)
ALT SERPL W P-5'-P-CCNC: 11 U/L (ref 10–52)
ANION GAP SERPL CALC-SCNC: 13 MMOL/L (ref 10–20)
ANTIBODY SCREEN: NORMAL
AST SERPL W P-5'-P-CCNC: 20 U/L (ref 9–39)
BASOPHILS # BLD AUTO: 0.02 X10*3/UL (ref 0–0.1)
BASOPHILS NFR BLD AUTO: 0.3 %
BILIRUB SERPL-MCNC: 0.3 MG/DL (ref 0–1.2)
BLOOD EXPIRATION DATE: NORMAL
BNP SERPL-MCNC: 493 PG/ML (ref 0–99)
BUN SERPL-MCNC: 23 MG/DL (ref 6–23)
CALCIUM SERPL-MCNC: 8.5 MG/DL (ref 8.6–10.3)
CARDIAC TROPONIN I PNL SERPL HS: 13 NG/L (ref 0–20)
CARDIAC TROPONIN I PNL SERPL HS: 14 NG/L (ref 0–20)
CHLORIDE SERPL-SCNC: 102 MMOL/L (ref 98–107)
CO2 SERPL-SCNC: 20 MMOL/L (ref 21–32)
CREAT SERPL-MCNC: 1.16 MG/DL (ref 0.5–1.3)
DISPENSE STATUS: NORMAL
EGFRCR SERPLBLD CKD-EPI 2021: 67 ML/MIN/1.73M*2
EOSINOPHIL # BLD AUTO: 0.17 X10*3/UL (ref 0–0.4)
EOSINOPHIL NFR BLD AUTO: 2.6 %
ERYTHROCYTE [DISTWIDTH] IN BLOOD BY AUTOMATED COUNT: 20.2 % (ref 11.5–14.5)
FERRITIN SERPL-MCNC: 45 NG/ML (ref 20–300)
GLUCOSE SERPL-MCNC: 93 MG/DL (ref 74–99)
HCT VFR BLD AUTO: 16.3 % (ref 41–52)
HCT VFR BLD AUTO: 28.9 % (ref 41–52)
HGB BLD-MCNC: 4.7 G/DL (ref 13.5–17.5)
HGB BLD-MCNC: 9.1 G/DL (ref 13.5–17.5)
HYPOCHROMIA BLD QL SMEAR: NORMAL
IMM GRANULOCYTES # BLD AUTO: 0.05 X10*3/UL (ref 0–0.5)
IMM GRANULOCYTES NFR BLD AUTO: 0.8 % (ref 0–0.9)
INR PPP: 1.1 (ref 0.9–1.1)
IRON SATN MFR SERPL: 20 % (ref 25–45)
IRON SERPL-MCNC: 86 UG/DL (ref 35–150)
LACTATE SERPL-SCNC: 0.6 MMOL/L (ref 0.4–2)
LYMPHOCYTES # BLD AUTO: 1 X10*3/UL (ref 0.8–3)
LYMPHOCYTES NFR BLD AUTO: 15.3 %
MAGNESIUM SERPL-MCNC: 1.89 MG/DL (ref 1.6–2.4)
MCH RBC QN AUTO: 22.8 PG (ref 26–34)
MCHC RBC AUTO-ENTMCNC: 28.8 G/DL (ref 32–36)
MCV RBC AUTO: 79 FL (ref 80–100)
MONOCYTES # BLD AUTO: 0.44 X10*3/UL (ref 0.05–0.8)
MONOCYTES NFR BLD AUTO: 6.7 %
NEUTROPHILS # BLD AUTO: 4.85 X10*3/UL (ref 1.6–5.5)
NEUTROPHILS NFR BLD AUTO: 74.3 %
NRBC BLD-RTO: 0.3 /100 WBCS (ref 0–0)
OVALOCYTES BLD QL SMEAR: NORMAL
PLATELET # BLD AUTO: 267 X10*3/UL (ref 150–450)
POLYCHROMASIA BLD QL SMEAR: NORMAL
POTASSIUM SERPL-SCNC: 3.9 MMOL/L (ref 3.5–5.3)
PRODUCT BLOOD TYPE: 6200
PRODUCT CODE: NORMAL
PROT SERPL-MCNC: 8.3 G/DL (ref 6.4–8.2)
PROTHROMBIN TIME: 12.2 SECONDS (ref 9.8–12.4)
RBC # BLD AUTO: 2.06 X10*6/UL (ref 4.5–5.9)
RBC MORPH BLD: NORMAL
RH FACTOR (ANTIGEN D): NORMAL
SODIUM SERPL-SCNC: 131 MMOL/L (ref 136–145)
STOMATOCYTES BLD QL SMEAR: NORMAL
TARGETS BLD QL SMEAR: NORMAL
TIBC SERPL-MCNC: 424 UG/DL (ref 240–445)
UIBC SERPL-MCNC: 338 UG/DL (ref 110–370)
UNIT ABO: NORMAL
UNIT NUMBER: NORMAL
UNIT RH: NORMAL
UNIT VOLUME: 350
WBC # BLD AUTO: 6.5 X10*3/UL (ref 4.4–11.3)
XM INTEP: NORMAL

## 2025-06-03 PROCEDURE — 80053 COMPREHEN METABOLIC PANEL: CPT | Performed by: PHYSICIAN ASSISTANT

## 2025-06-03 PROCEDURE — 85025 COMPLETE CBC W/AUTO DIFF WBC: CPT | Performed by: PHYSICIAN ASSISTANT

## 2025-06-03 PROCEDURE — 36415 COLL VENOUS BLD VENIPUNCTURE: CPT | Performed by: PHYSICIAN ASSISTANT

## 2025-06-03 PROCEDURE — 93005 ELECTROCARDIOGRAM TRACING: CPT

## 2025-06-03 PROCEDURE — 36430 TRANSFUSION BLD/BLD COMPNT: CPT

## 2025-06-03 PROCEDURE — 83735 ASSAY OF MAGNESIUM: CPT | Performed by: PHYSICIAN ASSISTANT

## 2025-06-03 PROCEDURE — 71045 X-RAY EXAM CHEST 1 VIEW: CPT | Performed by: RADIOLOGY

## 2025-06-03 PROCEDURE — 85014 HEMATOCRIT: CPT | Performed by: STUDENT IN AN ORGANIZED HEALTH CARE EDUCATION/TRAINING PROGRAM

## 2025-06-03 PROCEDURE — 96374 THER/PROPH/DIAG INJ IV PUSH: CPT | Mod: 59

## 2025-06-03 PROCEDURE — 99291 CRITICAL CARE FIRST HOUR: CPT | Performed by: PHYSICIAN ASSISTANT

## 2025-06-03 PROCEDURE — 82728 ASSAY OF FERRITIN: CPT | Performed by: STUDENT IN AN ORGANIZED HEALTH CARE EDUCATION/TRAINING PROGRAM

## 2025-06-03 PROCEDURE — 74174 CTA ABD&PLVS W/CONTRAST: CPT

## 2025-06-03 PROCEDURE — 85018 HEMOGLOBIN: CPT | Performed by: STUDENT IN AN ORGANIZED HEALTH CARE EDUCATION/TRAINING PROGRAM

## 2025-06-03 PROCEDURE — 86901 BLOOD TYPING SEROLOGIC RH(D): CPT | Performed by: PHYSICIAN ASSISTANT

## 2025-06-03 PROCEDURE — 99285 EMERGENCY DEPT VISIT HI MDM: CPT | Mod: 25

## 2025-06-03 PROCEDURE — 86923 COMPATIBILITY TEST ELECTRIC: CPT

## 2025-06-03 PROCEDURE — 84484 ASSAY OF TROPONIN QUANT: CPT | Performed by: PHYSICIAN ASSISTANT

## 2025-06-03 PROCEDURE — 96375 TX/PRO/DX INJ NEW DRUG ADDON: CPT | Mod: 59

## 2025-06-03 PROCEDURE — 2550000001 HC RX 255 CONTRASTS: Performed by: STUDENT IN AN ORGANIZED HEALTH CARE EDUCATION/TRAINING PROGRAM

## 2025-06-03 PROCEDURE — 71045 X-RAY EXAM CHEST 1 VIEW: CPT

## 2025-06-03 PROCEDURE — 83880 ASSAY OF NATRIURETIC PEPTIDE: CPT | Performed by: PHYSICIAN ASSISTANT

## 2025-06-03 PROCEDURE — P9016 RBC LEUKOCYTES REDUCED: HCPCS

## 2025-06-03 PROCEDURE — 85610 PROTHROMBIN TIME: CPT | Performed by: PHYSICIAN ASSISTANT

## 2025-06-03 PROCEDURE — 83540 ASSAY OF IRON: CPT | Performed by: STUDENT IN AN ORGANIZED HEALTH CARE EDUCATION/TRAINING PROGRAM

## 2025-06-03 PROCEDURE — 2500000004 HC RX 250 GENERAL PHARMACY W/ HCPCS (ALT 636 FOR OP/ED): Performed by: STUDENT IN AN ORGANIZED HEALTH CARE EDUCATION/TRAINING PROGRAM

## 2025-06-03 PROCEDURE — 83605 ASSAY OF LACTIC ACID: CPT | Performed by: PHYSICIAN ASSISTANT

## 2025-06-03 PROCEDURE — 86900 BLOOD TYPING SEROLOGIC ABO: CPT | Performed by: PHYSICIAN ASSISTANT

## 2025-06-03 PROCEDURE — 74174 CTA ABD&PLVS W/CONTRAST: CPT | Performed by: STUDENT IN AN ORGANIZED HEALTH CARE EDUCATION/TRAINING PROGRAM

## 2025-06-03 RX ORDER — FUROSEMIDE 10 MG/ML
20 INJECTION INTRAMUSCULAR; INTRAVENOUS ONCE
Status: COMPLETED | OUTPATIENT
Start: 2025-06-03 | End: 2025-06-03

## 2025-06-03 RX ORDER — PANTOPRAZOLE SODIUM 40 MG/10ML
80 INJECTION, POWDER, LYOPHILIZED, FOR SOLUTION INTRAVENOUS ONCE
Status: COMPLETED | OUTPATIENT
Start: 2025-06-03 | End: 2025-06-03

## 2025-06-03 RX ADMIN — PANTOPRAZOLE SODIUM 80 MG: 40 INJECTION, POWDER, FOR SOLUTION INTRAVENOUS at 20:34

## 2025-06-03 RX ADMIN — FUROSEMIDE 20 MG: 10 INJECTION, SOLUTION INTRAMUSCULAR; INTRAVENOUS at 20:34

## 2025-06-03 RX ADMIN — IOHEXOL 90 ML: 350 INJECTION, SOLUTION INTRAVENOUS at 19:44

## 2025-06-03 ASSESSMENT — LIFESTYLE VARIABLES
HAVE PEOPLE ANNOYED YOU BY CRITICIZING YOUR DRINKING: NO
HAVE YOU EVER FELT YOU SHOULD CUT DOWN ON YOUR DRINKING: NO
TOTAL SCORE: 0
EVER HAD A DRINK FIRST THING IN THE MORNING TO STEADY YOUR NERVES TO GET RID OF A HANGOVER: NO
EVER FELT BAD OR GUILTY ABOUT YOUR DRINKING: NO

## 2025-06-03 ASSESSMENT — COLUMBIA-SUICIDE SEVERITY RATING SCALE - C-SSRS
6. HAVE YOU EVER DONE ANYTHING, STARTED TO DO ANYTHING, OR PREPARED TO DO ANYTHING TO END YOUR LIFE?: NO
1. IN THE PAST MONTH, HAVE YOU WISHED YOU WERE DEAD OR WISHED YOU COULD GO TO SLEEP AND NOT WAKE UP?: NO
2. HAVE YOU ACTUALLY HAD ANY THOUGHTS OF KILLING YOURSELF?: NO

## 2025-06-03 ASSESSMENT — PAIN - FUNCTIONAL ASSESSMENT: PAIN_FUNCTIONAL_ASSESSMENT: 0-10

## 2025-06-03 ASSESSMENT — PAIN SCALES - GENERAL: PAINLEVEL_OUTOF10: 0 - NO PAIN

## 2025-06-03 NOTE — ED TRIAGE NOTES
Pt arrived through triage with c/o shortness of breath and lack of energy. Pt states normally when he feels like this it is because his blood count is low. Pt denies other issues or complaints. Pt was able to ambulate to room on his own.

## 2025-06-03 NOTE — ED PROVIDER NOTES
HPI   Chief Complaint   Patient presents with    Shortness of Breath    Weakness, Gen       This is a 70-year-old male coming in for shortness of breath.  He reports that the shortness of breath has been occurring for weeks however feels worse over the last few days.  He believes it is due to his anemia.  Patient has seen a PCP before but he states that he is transitioning to a new primary care because he did not like the old one.  Denies any chest pain associated with his symptoms.  He reports shortness of breath on exertion has had similar episodes before when he was anemic.  Patient denies any GI bleeding that is obvious.  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  He is supposed to see a new GI doctor.  He denies any other symptoms.  No vomiting.      History provided by:  Patient          Patient History   Medical History[1]  Surgical History[2]  Family History[3]  Social History[4]    Physical Exam   ED Triage Vitals [06/03/25 1331]   Temperature Heart Rate Respirations BP   36.9 °C (98.4 °F) 86 16 125/68      Pulse Ox Temp Source Heart Rate Source Patient Position   96 % Temporal Monitor Sitting      BP Location FiO2 (%)     Right arm --       Physical Exam  Vitals and nursing note reviewed.   Constitutional:       General: He is not in acute distress.     Appearance: Normal appearance. He is not ill-appearing or toxic-appearing.   HENT:      Head: Normocephalic and atraumatic.      Nose: Nose normal.      Mouth/Throat:      Mouth: Mucous membranes are moist.      Pharynx: Oropharynx is clear.   Eyes:      Extraocular Movements: Extraocular movements intact.      Conjunctiva/sclera: Conjunctivae normal.      Pupils: Pupils are equal, round, and reactive to light.   Cardiovascular:      Rate and Rhythm: Normal rate and regular rhythm.      Pulses: Normal pulses.       Heart sounds: Normal heart sounds.   Pulmonary:      Effort: Pulmonary effort is normal. No tachypnea or respiratory distress.      Breath sounds: Normal breath sounds. No wheezing or rales.   Abdominal:      General: Abdomen is flat. Bowel sounds are normal.      Palpations: Abdomen is soft.      Tenderness: There is no abdominal tenderness.   Musculoskeletal:         General: Normal range of motion.      Cervical back: Normal range of motion and neck supple.      Right lower leg: No edema.      Left lower leg: No edema.   Skin:     General: Skin is warm and dry.      Coloration: Skin is not jaundiced or pale.      Findings: No bruising.   Neurological:      General: No focal deficit present.      Mental Status: He is alert and oriented to person, place, and time. Mental status is at baseline.   Psychiatric:         Mood and Affect: Mood normal.         Behavior: Behavior normal.           ED Course & MDM   Diagnoses as of 06/03/25 1619   Anemia, unspecified type                 No data recorded                                 Medical Decision Making  Summary:  Medical Decision Making:   Patient presented as described in HPI. Patient case including ROS, PE, and treatment and plan discussed with ED attending if attached as cosigner. Results from labs and or imaging included below if completed. Denys YOLANDA Denton  is a 70 y.o. coming in for Patient presents with:  Shortness of Breath  Weakness, Gen  .  Patient complains of shortness of breath.  Has been occurring for multiple months.  He states he is in between having a PCP and has an appointment to  see a new one because he did not care for his old 1.  Patient was found to have a hemoglobin of 4.7.  He is not on anticoagulation medication.  This is likely from the AV malformation.  His vitals are stable otherwise.  Troponin is negative.  BNP is slightly elevated.  He has normal lung sounds.  Due to the low H&H and the anemia show decision making is completed  patient.  Patient was consentable to a blood transfusion secondary to the anemia.  His vitals are stable.  Offered patient hospitalization and transfer because we do not have GI coverage here however patient states that he does not want to be transferred.  He has had these issues for multiple years and states that he knows how he feels when he does have low blood counts.  He will be transfused with a total of 3 units of packed red blood cells and then discharged with PCP and GI follow-up.  I  attempted to have registration schedule appointment with GI for the patient however the patient states that he would rather wait till he sees his PCP to see if they recommend a GI instead.  Will be transfused with the packed red blood cells and then discharge.      Disposition is completed with shared decision making with the patient or guardian present with the patient. They were advised to follow up with PCP or recommended provider in 2-3 days for another evaluation and exam. I advised the patient to return or go to closest emergency room immediately if symptoms change, get worse, or new symptoms develop prior to follow up. I explained the plan and treatment course. Patient/guardian is in agreement with plan, treatment course, and follow up and state that they will comply.    Labs Reviewed  CBC WITH AUTO DIFFERENTIAL - Abnormal     WBC                           6.5                    nRBC                          0.3 (*)                RBC                           2.06 (*)               Hemoglobin                    4.7 (*)                Hematocrit                    16.3 (*)               MCV                           79 (*)                 MCH                           22.8 (*)               MCHC                          28.8 (*)               RDW                           20.2 (*)               Platelets                     267                    Neutrophils %                 74.3                   Immature Granulocytes  %, Automated   0.8                    Lymphocytes %                 15.3                   Monocytes %                   6.7                    Eosinophils %                 2.6                    Basophils %                   0.3                    Neutrophils Absolute          4.85                   Immature Granulocytes Absolute, Au*   0.05                   Lymphocytes Absolute          1.00                   Monocytes Absolute            0.44                   Eosinophils Absolute          0.17                   Basophils Absolute            0.02                COMPREHENSIVE METABOLIC PANEL - Abnormal     Glucose                       93                     Sodium                        131 (*)                Potassium                     3.9                    Chloride                      102                    Bicarbonate                   20 (*)                 Anion Gap                     13                     Urea Nitrogen                 23                     Creatinine                    1.16                   eGFR                          67                     Calcium                       8.5 (*)                Albumin                       3.7                    Alkaline Phosphatase          61                     Total Protein                 8.3 (*)                AST                           20                     Bilirubin, Total              0.3                    ALT                           11                  B-TYPE NATRIURETIC PEPTIDE - Abnormal     BNP                           493 (*)                  Narrative:    <100 pg/mL - Heart failure unlikely                100-299 pg/mL - Intermediate probability of acute heart                                failure exacerbation. Correlate with clinical                                context and patient history.                  >=300 pg/mL - Heart Failure likely. Correlate with clinical                                context and patient  history.                                BNP testing is performed using different testing methodology at [facility] than at other [facility]. Direct result comparisons should only be made within the same method.                   PROTIME-INR - Normal     Protime                       12.2                   INR                           1.1                 MAGNESIUM - Normal     Magnesium                     1.89                LACTATE - Normal     Lactate                       0.6                      Narrative: Venipuncture immediately after or during the administration of Metamizole may lead to falsely low results. Testing should be performed immediately prior to Metamizole dosing.  SERIAL TROPONIN-INITIAL - Normal     Troponin I, High Sensitivity   14                       Narrative: Less than 99th percentile of normal range cutoff-                Female and children under 18 years old <14 ng/L; Male <21 ng/L: Negative                Repeat testing should be performed if clinically indicated.                                 Female and children under 18 years old 14-50 ng/L; Male 21-50 ng/L:                Consistent with possible cardiac damage and possible increased clinical                 risk. Serial measurements may help to assess extent of myocardial damage.                                 >50 ng/L: Consistent with cardiac damage, increased clinical risk and                myocardial infarction. Serial measurements may help assess extent of                 myocardial damage.                                  NOTE: Children less than 1 year old may have higher baseline troponin                 levels and results should be interpreted in conjunction with the overall                 clinical context.                                 NOTE: Troponin I testing is performed using a different                 testing methodology at [facility] than at other                 system  hospitals. Direct result comparisons should only                 be made within the same method.  SERIAL TROPONIN, 1 HOUR - Normal     Troponin I, High Sensitivity   13                       Narrative: Less than 99th percentile of normal range cutoff-                Female and children under 18 years old <14 ng/L; Male <21 ng/L: Negative                Repeat testing should be performed if clinically indicated.                                 Female and children under 18 years old 14-50 ng/L; Male 21-50 ng/L:                Consistent with possible cardiac damage and possible increased clinical                 risk. Serial measurements may help to assess extent of myocardial damage.                                 >50 ng/L: Consistent with cardiac damage, increased clinical risk and                myocardial infarction. Serial measurements may help assess extent of                 myocardial damage.                                  NOTE: Children less than 1 year old may have higher baseline troponin                 levels and results should be interpreted in conjunction with the overall                 clinical context.                                 NOTE: Troponin I testing is performed using a different                 testing methodology at [facility] than at other                 [facility]. Direct result comparisons should only                 be made within the same method.  TROPONIN SERIES- (INITIAL, 1 HR)       Narrative: The following orders were created for panel order Troponin I Series, High Sensitivity (0, 1 HR).                Procedure                               Abnormality         Status                                   ---------                               -----------         ------                                   Troponin I, High Sensiti...[[id number]]  Normal              Final result                             Troponin, High Sensitivi...[[id number]]  Normal               Final result                                             Please view results for these tests on the individual orders.  TYPE AND SCREEN     ABO TYPE                      A                      Rh TYPE                       POS                    ANTIBODY SCREEN               NEG                 PREPARE RBC     PRODUCT CODE                  [id number]                Unit Number                                          Unit ABO                      A                      Unit RH                       POS                    XM INTEP                      COMP                   Dispense Status               IS                     Blood Expiration Date                                PRODUCT BLOOD TYPE            6200                   UNIT VOLUME                   350                    PRODUCT CODE                  [id number]                Unit Number                                          Unit ABO                      A                      Unit RH                       POS                    XM INTEP                      COMP                   Dispense Status               XM                     Blood Expiration Date                                PRODUCT BLOOD TYPE            6200                   UNIT VOLUME                   350                 PREPARE RBC  MORPHOLOGY   XR chest 1 view   Final Result    1.  Emphysematous changes in the lungs with bibasilar atelectasis.                      MACRO:    None          Signed by: Roberto Joiner 6/3/2025 2:17 PM    Dictation workstation:   [id number]                            Tests/Medications/Escalations of Care considered but not given: As in MDM    Patient care discussed with: N/A  Social Determinants affecting care: N/A    Final diagnosis and disposition as documented     Diagnoses as of 06/03/25 1623  Anemia, unspecified type       Shared decision making was completed and determined that patient will be discharged. I discussed the differential; results and  discharge plan with the patient and/or family/friend/caregiver if present.  I emphasized the importance of follow-up with the physician I referred them to in the timeframe recommended.  I explained reasons for the patient to return to the Emergency Department. They agreed that if they feel their condition is worsening or if they have any other concern they should call 911 immediately for further assistance. I gave the patient an opportunity to ask all questions they had and answered all of them accordingly. They understand return precautions and discharge instructions. The patient and/or family/friend/caregiver expressed understanding verbally and that they would comply.     Disposition: Discharge      This note has been transcribed using voice recognition and may contain grammatical errors, misplaced words, incorrect words, incorrect phrases or other errors.         Procedure  Critical Care    Performed by: René Cutler PA-C  Authorized by: Nemo Quinones DO    Critical care provider statement:     Critical care time (minutes):  31    Critical care was necessary to treat or prevent imminent or life-threatening deterioration of the following conditions:  Circulatory failure    Critical care was time spent personally by me on the following activities:  Development of treatment plan with patient or surrogate, evaluation of patient's response to treatment, examination of patient, obtaining history from patient or surrogate, ordering and performing treatments and interventions, ordering and review of laboratory studies, ordering and review of radiographic studies, pulse oximetry, re-evaluation of patient's condition and review of old charts           [1]   Past Medical History:  Diagnosis Date    Personal history of other diseases of the circulatory system     History of hypertension    Personal history of other specified conditions 08/30/2022    History of chest pain    Personal history of transient ischemic attack  (TIA), and cerebral infarction without residual deficits 05/05/2021    History of cerebrovascular accident    Pure hypercholesterolemia, unspecified 08/30/2022    Hypercholesterolemia   [2]   Past Surgical History:  Procedure Laterality Date    COLOSTOMY  05/08/2014    Colostomy    KNEE SURGERY  05/08/2014    Knee Surgery    MR HEAD ANGIO WO IV CONTRAST  12/15/2023    MR HEAD ANGIO WO IV CONTRAST 12/15/2023 GEN MRI    MR NECK ANGIO WO IV CONTRAST  12/15/2023    MR NECK ANGIO WO IV CONTRAST 12/15/2023 GEN MRI    OTHER SURGICAL HISTORY  05/05/2021    Carpal tunnel surgery    OTHER SURGICAL HISTORY  05/05/2021    Sinus surgery    OTHER SURGICAL HISTORY  06/23/2021    Complete colonoscopy    OTHER SURGICAL HISTORY  06/17/2014    Carotid Artery Catheterization   [3] No family history on file.  [4]   Social History  Tobacco Use    Smoking status: Every Day     Current packs/day: 1.00     Types: Cigarettes     Passive exposure: Current    Smokeless tobacco: Never   Vaping Use    Vaping status: Never Used   Substance Use Topics    Alcohol use: Yes     Alcohol/week: 28.0 standard drinks of alcohol     Types: 28 Cans of beer per week     Comment: daily colleen drinker 2-3 x many years    Drug use: Never        René Cutler PA-C  06/03/25 [id number]

## 2025-06-04 ENCOUNTER — TELEPHONE (OUTPATIENT)
Dept: HEMATOLOGY/ONCOLOGY | Facility: CLINIC | Age: 70
End: 2025-06-04

## 2025-06-04 VITALS
HEART RATE: 61 BPM | WEIGHT: 125 LBS | RESPIRATION RATE: 17 BRPM | OXYGEN SATURATION: 94 % | BODY MASS INDEX: 19.62 KG/M2 | TEMPERATURE: 97.9 F | DIASTOLIC BLOOD PRESSURE: 65 MMHG | SYSTOLIC BLOOD PRESSURE: 126 MMHG | HEIGHT: 67 IN

## 2025-06-04 PROBLEM — K56.2 VOLVULUS (MULTI): Status: ACTIVE | Noted: 2025-06-04

## 2025-06-04 PROBLEM — N28.89 RENAL MASS: Status: ACTIVE | Noted: 2025-06-04

## 2025-06-04 PROBLEM — R06.02 SHORTNESS OF BREATH: Status: ACTIVE | Noted: 2023-12-14

## 2025-06-04 LAB
ATRIAL RATE: 65 BPM
P AXIS: 63 DEGREES
P OFFSET: 174 MS
P ONSET: 113 MS
PR INTERVAL: 212 MS
Q ONSET: 219 MS
QRS COUNT: 10 BEATS
QRS DURATION: 92 MS
QT INTERVAL: 426 MS
QTC CALCULATION(BAZETT): 443 MS
QTC FREDERICIA: 437 MS
R AXIS: 61 DEGREES
T AXIS: 54 DEGREES
T OFFSET: 432 MS
VENTRICULAR RATE: 65 BPM

## 2025-06-04 PROCEDURE — 99222 1ST HOSP IP/OBS MODERATE 55: CPT | Performed by: SURGERY

## 2025-06-04 ASSESSMENT — ACTIVITIES OF DAILY LIVING (ADL): LACK_OF_TRANSPORTATION: NO

## 2025-06-04 NOTE — TELEPHONE ENCOUNTER
Spoke with patient (he goes by Kaden). Explained [facility] referral, he was already aware of incidental finding on his kidney. He states that he has not yet met this new PCP he's scheduled with in July so he would prefer to work with our team to address the finding. Patient is scheduled for VV with [facility] on 6/6. Diagnostic Clinic phone number provided. No further needs at this time.

## 2025-06-04 NOTE — PROGRESS NOTES
For full history, physical exam, and prior hospital course, please see previous ED provider note. This is in addition to the primary record.    Chief Complaint   Patient presents with    Shortness of Breath    Weakness, Gen       Comments/Additional Findings: Patient was signed out to me by Dr Quinones at change of shift.   Pending items at this time include admission.    Discussed with patient plan and he does not want to be transferred and would like to wait till morning for admission here in the ED.  Plan will be to sign out patient to oncoming physician at shift change.    I discussed the differential, results and plan with the patient and/or family/friend/caregiver if present. All questions answered.              ED Course as of 06/04/25 0509   Tue Jun 03, 2025 1930 70-year-old presents the emergency department shortness of breath.  Found to have a hemoglobin of 4.7.  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  Has not had a workup from a GI standpoint in probably 10 years per patient.  He is refusing rectal exam but denying hematochezia or melena.  It does appear an iron deficiency anemia.  Iron studies added.  CT angio obtained as patient was amendable.  Concern for bleed given the acute drop in his hemoglobin.  Patient adamant regarding discharge and GI follow-up.  Referrals made to outpatient GI as well as hematology for continuation and workup regarding his anemia.  Discussed with patient who is agreeable with the plan. [HD]   1931 Given the elevated BNP and the fact that he is getting 3 units of blood will give a dose of Lasix. [HD]   2230 IMPRESSION:  1. No evidence of active gastrointestinal hemorrhage.      2. Approximately 180 degree swirling of the sigmoid mesentery  representing a partial volvulus with gaseous distention of the  sigmoid colon extending far into the upper abdomen but no evidence of  upstream bowel dilatation suggests  high-grade obstruction at this  time.      3. Diffuse fluid-filled small bowel could relate to malabsorption or  enteritis.      4. Pulmonary edema and moderate cardiomegaly.      5. Intermediate density mass in the left kidney measuring 1 cm that  is favored to represent a proteinaceous/hemorrhagic cyst with pseudo  enhancement versus true enhancement but difficult to exclude solid  neoplasm. This could be further characterized with a nonemergent  ultrasound or renal mass protocol MRI with contrast. YELLOW ALERT: An  incidental finding alert was sent per protocol.      6. Small wide neck bilateral direct inguinal hernias containing small  bowel in the left and bladder wall on the right.   [HD]   2254 Amendable to admission  [HD]   2359 HEMOGLOBIN(!): 9.1 [WL]   Wed Jun 04, 2025   0025 Case discussed with Dr Hylton and states since there is no one around for endoscopy through the night patient will need to stay in the ED till morning.  Discussed this with patient he is agreeable with the plan. [WL]      ED Course User Index  [HD] Nemo Quinones DO  [WL] Denys Lockett DO         Diagnoses as of 06/04/25 0509   Anemia, unspecified type   Shortness of breath   Volvulus (Multi)   Renal mass       CT angio abdomen pelvis w and or wo IV IV contrast   Final Result   1. No evidence of active gastrointestinal hemorrhage.        2. Approximately 180 degree swirling of the sigmoid mesentery   representing a partial volvulus with gaseous distention of the   sigmoid colon extending far into the upper abdomen but no evidence of   upstream bowel dilatation suggests high-grade obstruction at this   time.        3. Diffuse fluid-filled small bowel could relate to malabsorption or   enteritis.        4. Pulmonary edema and moderate cardiomegaly.        5. Intermediate density mass in the left kidney measuring 1 cm that   is favored to represent a proteinaceous/hemorrhagic cyst with pseudo   enhancement versus true enhancement but  difficult to exclude solid   neoplasm. This could be further characterized with a nonemergent   ultrasound or renal mass protocol MRI with contrast. YELLOW ALERT: An   incidental finding alert was sent per protocol.        6. Small wide neck bilateral direct inguinal hernias containing small   bowel in the left and bladder wall on the right.        MACRO:   None.        Signed by: Donte Souza 6/3/2025 8:27 PM   Dictation workstation:   [id number]      XR chest 1 view   Final Result   1.  Emphysematous changes in the lungs with bibasilar atelectasis.                  MACRO:   None        Signed by: Roberto Joiner 6/3/2025 2:17 PM   Dictation workstation:   [id number]        Labs Reviewed   CBC WITH AUTO DIFFERENTIAL - Abnormal       Result Value    WBC 6.5      nRBC 0.3 (*)     RBC 2.06 (*)     Hemoglobin 4.7 (*)     Hematocrit 16.3 (*)     MCV 79 (*)     MCH 22.8 (*)     MCHC 28.8 (*)     RDW 20.2 (*)     Platelets 267      Neutrophils % 74.3      Immature Granulocytes %, Automated 0.8      Lymphocytes % 15.3      Monocytes % 6.7      Eosinophils % 2.6      Basophils % 0.3      Neutrophils Absolute 4.85      Immature Granulocytes Absolute, Automated 0.05      Lymphocytes Absolute 1.00      Monocytes Absolute 0.44      Eosinophils Absolute 0.17      Basophils Absolute 0.02     COMPREHENSIVE METABOLIC PANEL - Abnormal    Glucose 93      Sodium 131 (*)     Potassium 3.9      Chloride 102      Bicarbonate 20 (*)     Anion Gap 13      Urea Nitrogen 23      Creatinine 1.16      eGFR 67      Calcium 8.5 (*)     Albumin 3.7      Alkaline Phosphatase 61      Total Protein 8.3 (*)     AST 20      Bilirubin, Total 0.3      ALT 11     B-TYPE NATRIURETIC PEPTIDE - Abnormal     (*)     Narrative:        <100 pg/mL - Heart failure unlikely  100-299 pg/mL - Intermediate probability of acute heart                  failure exacerbation. Correlate with clinical                  context and patient history.    >=300  pg/mL - Heart Failure likely. Correlate with clinical                  context and patient history.    BNP testing is performed using different testing methodology at [facility] than at other [facility]. Direct result comparisons should only be made within the same method.      IRON AND TIBC - Abnormal    Iron 86      UIBC 338      TIBC 424      % Saturation 20 (*)    HEMOGLOBIN AND HEMATOCRIT, BLOOD - Abnormal    Hemoglobin 9.1 (*)     Hematocrit 28.9 (*)    PROTIME-INR - Normal    Protime 12.2      INR 1.1     MAGNESIUM - Normal    Magnesium 1.89     LACTATE - Normal    Lactate 0.6      Narrative:     Venipuncture immediately after or during the administration of Metamizole may lead to falsely low results. Testing should be performed immediately prior to Metamizole dosing.   SERIAL TROPONIN-INITIAL - Normal    Troponin I, High Sensitivity 14      Narrative:     Less than 99th percentile of normal range cutoff-  Female and children under 18 years old <14 ng/L; Male <21 ng/L: Negative  Repeat testing should be performed if clinically indicated.     Female and children under 18 years old 14-50 ng/L; Male 21-50 ng/L:  Consistent with possible cardiac damage and possible increased clinical   risk. Serial measurements may help to assess extent of myocardial damage.     >50 ng/L: Consistent with cardiac damage, increased clinical risk and  myocardial infarction. Serial measurements may help assess extent of   myocardial damage.      NOTE: Children less than 1 year old may have higher baseline troponin   levels and results should be interpreted in conjunction with the overall   clinical context.     NOTE: Troponin I testing is performed using a different   testing methodology at [facility] than at other   [facility]. Direct result comparisons should only   be made within the same method.   SERIAL TROPONIN, 1 HOUR - Normal    Troponin I, High Sensitivity 13      Narrative:     Less  than 99th percentile of normal range cutoff-  Female and children under 18 years old <14 ng/L; Male <21 ng/L: Negative  Repeat testing should be performed if clinically indicated.     Female and children under 18 years old 14-50 ng/L; Male 21-50 ng/L:  Consistent with possible cardiac damage and possible increased clinical   risk. Serial measurements may help to assess extent of myocardial damage.     >50 ng/L: Consistent with cardiac damage, increased clinical risk and  myocardial infarction. Serial measurements may help assess extent of   myocardial damage.      NOTE: Children less than 1 year old may have higher baseline troponin   levels and results should be interpreted in conjunction with the overall   clinical context.     NOTE: Troponin I testing is performed using a different   testing methodology at [facility] than at other   [facility]. Direct result comparisons should only   be made within the same method.   FERRITIN - Normal    Ferritin 45     TROPONIN SERIES- (INITIAL, 1 HR)    Narrative:     The following orders were created for panel order Troponin I Series, High Sensitivity (0, 1 HR).  Procedure                               Abnormality         Status                     ---------                               -----------         ------                     Troponin I, High Sensiti...[[id number]]  Normal              Final result               Troponin, High Sensitivi...[[id number]]  Normal              Final result                 Please view results for these tests on the individual orders.   TYPE AND SCREEN    ABO TYPE A      Rh TYPE POS      ANTIBODY SCREEN NEG     PREPARE RBC    PRODUCT CODE X2543D06      Unit Number [id number]      Unit ABO A      Unit RH POS      XM INTEP COMP      Dispense Status TR      Blood Expiration Date 6/17/2025 11:59:00 PM EDT      PRODUCT BLOOD TYPE 6200      UNIT VOLUME 350      PRODUCT CODE J3103S48      Unit Number [id number]      Unit  ABO A      Unit RH POS      XM INTEP COMP      Dispense Status TR      Blood Expiration Date 6/17/2025 11:59:00 PM EDT      PRODUCT BLOOD TYPE 6200      UNIT VOLUME 350     PREPARE RBC    PRODUCT CODE A2986X95      Unit Number [id number]      Unit ABO A      Unit RH POS      XM INTEP COMP      Dispense Status TR      Blood Expiration Date 6/19/2025 11:59:00 PM EDT      PRODUCT BLOOD TYPE 6200      UNIT VOLUME 350     MORPHOLOGY    RBC Morphology See Below      Polychromasia Mild      Hypochromia Marked      Target Cells Few      Ovalocytes Few      Stomatocytes Few             Procedure  Procedures             Note: This note was dictated by speech recognition. Minor errors in transcription may be present.

## 2025-06-04 NOTE — TELEPHONE ENCOUNTER
Referral placed to [facility] by Dr. Nemo Quinones, [facility], for renal lesion, noted incidentally on CTA abd/pelvis imaging yesterday, 6/3/25. Advise virtual visit w/ diagnostic clinic for further evaluation or alternatively could review w/ his PCP at upcoming visit.   ABDULKADIR George.CNP  [facility]

## 2025-06-04 NOTE — PROGRESS NOTES
This was a signout on June 4, 2025 at 0600 hrs.  Dr. Mast is here to see the patient.  Patient has no abdominal pain and no surgical abdomen on examination.  He is going to follow-up with Dr. Mast regarding the hernia and this incidental finding of a volvulus.  Patient feels comfortable being discharged at this time.  Will also follow-up with his anemia as scheduled.    Jocelin Allred, DO

## 2025-06-04 NOTE — PROGRESS NOTES
06/04/25 0728   Discharge Planning   Living Arrangements Spouse/significant other;Children   Support Systems Children;Spouse/significant other   Assistance Needed A&OX4; independent with ADLs with no DME; drives; room air baseline and currently room air; new PCP Dr Juan Doty   Type of Residence Private residence   Number of Stairs to Enter Residence 3   Number of Stairs Within Residence 13   Do you have animals or pets at home? Yes   Type of Animals or Pets 1 dog   Who is requesting discharge planning? Provider   Expected Discharge Disposition Home  (Patient denies no home going needs. Pt is a confirmed dc from ED)   Financial Resource Strain   How hard is it for you to pay for the very basics like food, housing, medical care, and heating? Not hard   Housing Stability   In the last 12 months, was there a time when you were not able to pay the mortgage or rent on time? N   In the past 12 months, how many times have you moved where you were living? 0   At any time in the past 12 months, were you homeless or living in a shelter (including now)? N   Transportation Needs   In the past 12 months, has lack of transportation kept you from medical appointments or from getting medications? no   In the past 12 months, has lack of transportation kept you from meetings, work, or from getting things needed for daily living? No   Intensity of Service   Intensity of Service 0-30 min     06/04/2025 0730am  Spoke with patient bedside in ED

## 2025-06-04 NOTE — CONSULTS
General Surgery History and Physical    Referring Provider:  No Assigned PCP Generic Provider, MD  No ref. provider found     Chief Complaint:  Chief Complaint   Patient presents with    Shortness of Breath    Weakness, Gen       History of Present Illness:  Denys Denton is a 70 y.o. male who was evaluated in ED for progressive worsening of shortness of breath. He had similar problem before when he was anemic. He does have a history of an AV malformation of the small intestine which is caused some of his bleeding before in the past. Patient denies any GI bleeding that is obvious. He denies any other symptoms. No vomiting and no abdominal pain. He was noted to have Hg at 4.7. his baseline is around 9.   CTA showed:    IMPRESSION:  1. No evidence of active gastrointestinal hemorrhage.      2. Approximately 180 degree swirling of the sigmoid mesentery  representing a partial volvulus with gaseous distention of the  sigmoid colon extending far into the upper abdomen but no evidence of  upstream bowel dilatation suggests high-grade obstruction at this  time.      3. Diffuse fluid-filled small bowel could relate to malabsorption or  enteritis.      6. Small wide neck bilateral direct inguinal hernias containing small  bowel in the left and bladder wall on the right.    He got 3U of RBC, his Hg went up to 9/1.     When I saw him, he denies any abdominal pain or groin pain.   Regular BM and flatus  Does have problem with constipation   His IH reducible.                 Past Medical History:  Medical History[1]     Past Surgical History:  Surgical History[2]     Medications:  Current Outpatient Medications   Medication Instructions    amLODIPine (NORVASC) 5 mg, Daily    atorvastatin (LIPITOR) 40 mg, oral, Nightly    cetirizine (ZYRTEC) 5 mg, Daily    folic acid (FOLVITE) 1 mg, Daily RT    levothyroxine (SYNTHROID, LEVOXYL) 50 mcg, oral, Daily, Take on an empty stomach at the same time each day, either 30 to 60 minutes  prior to breakfast    lisinopriL-hydrochlorothiazide 20-12.5 mg tablet 1 tablet, Daily    omeprazole (PRILOSEC) 40 mg, oral, Daily before breakfast, Do not crush or chew.        Allergies:  RX Allergies[3]     Family History:  Family History[4]     Social History:  Social History[5]     Review of Systems:  A complete 12 point review of systems was performed and is negative except as noted in the history of present illness.      Vital Signs:  Vitals:    06/04/25 0715   BP:    Pulse:    Resp:    Temp:    SpO2: 94%      Body mass index is 19.58 kg/m².    Physical Exam:  General: No acute distress.  Neuro: Alert and oriented ×3. Follows commands.  Head: Atraumatic  Eyes: Pupils equal reactive to light. Extraocular motions intact.  Ears: Hears normal speaking voice.  Mouth, Nose, Throat: Mucous membranes moist.    Neck: Supple. No visible masses.  Breast: not examined.   Lung: Patent airway. Breathing not labored.   Vascular: Palpable radial pulses bilaterally.  Abdomen: Soft. Nondistended. Nontender.    Groin: reducible small IH on both sides. Not tender.   Rectal: Not examined.   Genitourinary: Not examined.   Musculoskeletal: Moves all extremities.  Extremities: No yanosis.   Lymphatic: No palpable lymph nodes.  Skin: No rashes or lesions.  Psychological: Normal affect      Laboratory Values:  CBC:   Lab Results   Component Value Date    WBC 6.5 06/03/2025    RBC 2.06 (L) 06/03/2025    HGB 9.1 (L) 06/03/2025    HCT 28.9 (L) 06/03/2025     06/03/2025       RFP:   Lab Results   Component Value Date     (L) 06/03/2025    K 3.9 06/03/2025     06/03/2025    CO2 20 (L) 06/03/2025    BUN 23 06/03/2025    CREATININE 1.16 06/03/2025    CALCIUM 8.5 (L) 06/03/2025    MG 1.89 06/03/2025        LFTs:   Lab Results   Component Value Date    PROT 8.3 (H) 06/03/2025    ALBUMIN 3.7 06/03/2025    BILITOT 0.3 06/03/2025    ALKPHOS 61 06/03/2025    AST 20 06/03/2025    ALT 11 06/03/2025            Imaging:  I have  personally reviewed the images and the radiologist's report.  Imaging  CT angio abdomen pelvis w and or wo IV IV contrast  Result Date: 6/3/2025  1. No evidence of active gastrointestinal hemorrhage.   2. Approximately 180 degree swirling of the sigmoid mesentery representing a partial volvulus with gaseous distention of the sigmoid colon extending far into the upper abdomen but no evidence of upstream bowel dilatation suggests high-grade obstruction at this time.   3. Diffuse fluid-filled small bowel could relate to malabsorption or enteritis.   4. Pulmonary edema and moderate cardiomegaly.   5. Intermediate density mass in the left kidney measuring 1 cm that is favored to represent a proteinaceous/hemorrhagic cyst with pseudo enhancement versus true enhancement but difficult to exclude solid neoplasm. This could be further characterized with a nonemergent ultrasound or renal mass protocol MRI with contrast. YELLOW ALERT: An incidental finding alert was sent per protocol.   6. Small wide neck bilateral direct inguinal hernias containing small bowel in the left and bladder wall on the right.   MACRO: None.   Signed by: Donte Souza 6/3/2025 8:27 PM Dictation workstation:   [id number]    XR chest 1 view  Result Date: 6/3/2025  1.  Emphysematous changes in the lungs with bibasilar atelectasis.       MACRO: None   Signed by: Roberto Joiner 6/3/2025 2:17 PM Dictation workstation:   [id number]      Cardiology, Vascular, and Other Imaging  No other imaging results found for the past 7 days        Assessment:  This is a 70 y.o. male who has below conditions:  Anemia: unknown etiology, ? 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 finding, no clinical symptoms   Reducible inguinal hernia on both sides     Plan:  Follow up with me as outpatient for IH and sigmoid colon condition  Consult GI or follow up with GI if discharged   Anemia work up per medicine  and ED   Call if any concern       Kt Mast MD [facility]  General Surgery  Office: [phone]  Fax:     [phone]  7:26 AM   06/04/25          [1]   Past Medical History:  Diagnosis Date    Personal history of other diseases of the circulatory system     History of hypertension    Personal history of other specified conditions 08/30/2022    History of chest pain    Personal history of transient ischemic attack (TIA), and cerebral infarction without residual deficits 05/05/2021    History of cerebrovascular accident    Pure hypercholesterolemia, unspecified 08/30/2022    Hypercholesterolemia   [2]   Past Surgical History:  Procedure Laterality Date    COLOSTOMY  05/08/2014    Colostomy    KNEE SURGERY  05/08/2014    Knee Surgery    MR HEAD ANGIO WO IV CONTRAST  12/15/2023    MR HEAD ANGIO WO IV CONTRAST 12/15/2023 GEN MRI    MR NECK ANGIO WO IV CONTRAST  12/15/2023    MR NECK ANGIO WO IV CONTRAST 12/15/2023 GEN MRI    OTHER SURGICAL HISTORY  05/05/2021    Carpal tunnel surgery    OTHER SURGICAL HISTORY  05/05/2021    Sinus surgery    OTHER SURGICAL HISTORY  06/23/2021    Complete colonoscopy    OTHER SURGICAL HISTORY  06/17/2014    Carotid Artery Catheterization   [3]   Allergies  Allergen Reactions    Clopidogrel GI bleeding and Other     May/June 2013    Clopidogrel Bisulfate Other     Bleeding    Sulfa (Sulfonamide Antibiotics) Hives and Rash    Sulfamethoxazole-Trimethoprim Rash and Unknown   [4] No family history on file.  [5]   Social History  Socioeconomic History    Marital status:    Tobacco Use    Smoking status: Every Day     Current packs/day: 1.00     Types: Cigarettes     Passive exposure: Current    Smokeless tobacco: Never   Vaping Use    Vaping status: Never Used   Substance and Sexual Activity    Alcohol use: Yes     Alcohol/week: 28.0 standard drinks of alcohol     Types: 28 Cans of beer per week     Comment: daily colleen drinker 2-3 x many years    Drug use: Never    Sexual activity: Defer      Social Drivers of Health     Financial Resource Strain: Low Risk  (12/15/2023)    Overall Financial Resource Strain (CARDIA)     Difficulty of Paying Living Expenses: Not hard at all   Food Insecurity: No Food Insecurity (12/15/2023)    Hunger Vital Sign     Worried About Running Out of Food in the Last Year: Never true     Ran Out of Food in the Last Year: Never true   Transportation Needs: No Transportation Needs (12/15/2023)    PRAPARE - Transportation     Lack of Transportation (Medical): No     Lack of Transportation (Non-Medical): No   Stress: No Stress Concern Present (12/15/2023)    [facility] - Occupational Stress Questionnaire     Feeling of Stress : Not at all   Social Connections: Unknown (12/15/2023)    Social Connection and Isolation Panel [NHANES]     Marital Status:    Intimate Partner Violence: Not At Risk (12/15/2023)    Humiliation, Afraid, Rape, and Kick questionnaire     Fear of Current or Ex-Partner: No     Emotionally Abused: No     Physically Abused: No     Sexually Abused: No   Housing Stability: Low Risk  (12/15/2023)    Housing Stability Vital Sign     Unable to Pay for Housing in the Last Year: No     Number of Places Lived in the Last Year: 1     Unstable Housing in the Last Year: No

## 2025-06-05 NOTE — PROGRESS NOTES
[facility]  Diagnostic Clinic  New Patient Virtual Visit    Date of Service: 25      Patient Name: Denys Denton   : 1955  MRN: [id number]   PCP: No Assigned PCP Generic Provider, MD   Referred by: Nemo Quinones MD    Problem: renal lesion    HPI: Denys Denton is a 70 y.o. 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 Nemo Quinones, [facility] ED.     Mr. Denton initially presented to [facility] ED on 6/3/25 with complaint of SOB. He reported history of AV malformation of the small intestine, causing bleeding and chronic anemia. He was concerned his Hgb was low & needed RBC transfusion, due to past experience. CTA abd/pelvis showed partial volvulus of the sigmoid mesentery, as well as incidental finding of a 1cm intermediate density mass in the left kidney, favored to represent proteinaceous/hemorrhagic cyst but solid neoplasm not excluded. He was transfused 3 units of RBCs for Hgb 4.7 & advised to follow-up w/ GI and primary care. Furthermore, he was referred to the diagnostic clinic for evaluation of his indeterminate renal lesion.    He presents virtually via video to clinic today from his home & reports feeling overall improved since her ED visit & RBC transfusions. Reports longstanding history of anemia w/ AVM of SB, requiring transfusions, not currently established w/ PCP or Gastro. No prior anemia work-up. He is aware of CT findings from ED, including 1cm left renal lesion. He denies fevers/chills, night sweats, unintentional wt loss, flank pain, hematuria, or irritative voiding symptoms. He is a current cigarette smoker, has contemplated quitting in the past but no plans to quit at present.    History of tobacco use:   Current cigarette smoker, 50 yr history, 1 ppd.    Personal history of malignancy:   None.    Family  history of malignancy:   Mother - pancreas cancer.  Paternal grandmother - cancer.  Maternal grandfather - cancer.    Health Maintenance:  Last screening colonoscopy 5/7/21: No specimens collected. Repeat c-scope due in 5 yrs for surveillance (2026).    Lab Results   Component Value Date    PSA 0.34 04/01/2025       PATHOLOGY:  N/A     IMAGING:    === 06/03/25 ===    CT ABDOMEN PELVIS ANGIOGRAM W AND/OR WO IV CONTRAST    - Impression -  1. No evidence of active gastrointestinal hemorrhage.    2. Approximately 180 degree swirling of the sigmoid mesentery  representing a partial volvulus with gaseous distention of the  sigmoid colon extending far into the upper abdomen but no evidence of  upstream bowel dilatation suggests high-grade obstruction at this  time.    3. Diffuse fluid-filled small bowel could relate to malabsorption or  enteritis.    4. Pulmonary edema and moderate cardiomegaly.    5. Intermediate density mass in the left kidney measuring 1 cm that  is favored to represent a proteinaceous/hemorrhagic cyst with pseudo  enhancement versus true enhancement but difficult to exclude solid  neoplasm. This could be further characterized with a nonemergent  ultrasound or renal mass protocol MRI with contrast. YELLOW ALERT: An  incidental finding alert was sent per protocol.    6. Small wide neck bilateral direct inguinal hernias containing small  bowel in the left and bladder wall on the right.    MACRO:  None.    Signed by: Donte Souza 6/3/2025 8:27 PM  Dictation workstation:   [id number]      === 06/03/25 ===    XR CHEST 1 VIEW    - Impression -  1.  Emphysematous changes in the lungs with bibasilar atelectasis.        MACRO:  None    Signed by: Roberto Joiner 6/3/2025 2:17 PM  Dictation workstation:   [id number]    LABS:  6/3/25 ED lab work reviewed.    PAST MEDICAL HISTORY:  Medical History[1]    PAST SURGICAL HISTORY:  Surgical History[2]    SOCIAL HISTORY:  Social Connections: Unknown (12/15/2023)     Social Connection and Isolation Panel [NHANES]     Frequency of Communication with Friends and Family: Not on file     Frequency of Social Gatherings with Friends and Family: Not on file     Attends Gnosticism Services: Not on file     Active Member of Clubs or Organizations: Not on file     Attends Club or Organization Meetings: Not on file     Marital Status:        FAMILY HISTORY:  Family History[3]    MEDICATIONS:  Medications Ordered Prior to Encounter[4]      REVIEW OF SYSTEMS:  Review of Systems   Constitutional:  Negative for appetite change, chills, fever and unexpected weight change.   Respiratory:  Negative for shortness of breath.    Gastrointestinal:  Negative for abdominal pain.   Endocrine:        Denies night sweats   Genitourinary:  Negative for dysuria and hematuria.         +urinary stream slow to start   Musculoskeletal:  Negative for flank pain.   Neurological:  Negative for extremity weakness and speech difficulty.       OBJECTIVE:  There were no vitals taken for this visit.  General: Alert, in no acute distress, pleasant, conversant.  Remainder of exam deferred due to virtual video visit.     ASSESSMENT:  Denys Denton is a 70 y.o. 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    PLAN:  1. Kidney lesion, native, left (Primary)  2. Tobacco use disorder  -- MRI kidney w/wo ordered for further evaluation of indeterminate renal lesion.   -- Appt to establish w/ primary care scheduled for 7/2.   -- Strongly encourage tobacco cessation. Discussed tobacco cessation clinic & resources; pt will consider.  -- Pt reports Gastro provider for upcoming appt is out-of-network. I asked the schedulers to assist w/ arranging new appt. Also asked the schedulers to assist w/ arranging benign hematology appt for anemia work-up.  -- All patient questions  answered. I provided the patient w/ the contact information for the diagnostic clinic; advised him to call in the interim with any questions/issues.     ABDULKADIR George.CNP  [facility]         Virtual or Telephone Consent    An interactive audio and video telecommunication system which permits real time communications between the patient (at the originating site) and provider (at the distant site) was utilized to provide this telehealth service.   Verbal consent was requested and obtained from Denys Denton on this date, 06/06/25 for a telehealth visit and the patient's location was confirmed at the time of the visit.         [1]   Past Medical History:  Diagnosis Date    Personal history of other diseases of the circulatory system     History of hypertension    Personal history of other specified conditions 08/30/2022    History of chest pain    Personal history of transient ischemic attack (TIA), and cerebral infarction without residual deficits 05/05/2021    History of cerebrovascular accident    Pure hypercholesterolemia, unspecified 08/30/2022    Hypercholesterolemia   [2]   Past Surgical History:  Procedure Laterality Date    COLOSTOMY  05/08/2014    Colostomy    KNEE SURGERY  05/08/2014    Knee Surgery    MR HEAD ANGIO WO IV CONTRAST  12/15/2023    MR HEAD ANGIO WO IV CONTRAST 12/15/2023 GEN MRI    MR NECK ANGIO WO IV CONTRAST  12/15/2023    MR NECK ANGIO WO IV CONTRAST 12/15/2023 GEN MRI    OTHER SURGICAL HISTORY  05/05/2021    Carpal tunnel surgery    OTHER SURGICAL HISTORY  05/05/2021    Sinus surgery    OTHER SURGICAL HISTORY  06/23/2021    Complete colonoscopy    OTHER SURGICAL HISTORY  06/17/2014    Carotid Artery Catheterization   [3] No family history on file.  [4]   Current Outpatient Medications on File Prior to Visit   Medication Sig Dispense Refill    amLODIPine (Norvasc) 5 mg tablet Take 1 tablet (5 mg) by mouth once daily.      atorvastatin (Lipitor) 40 mg tablet TAKE 1  TABLET BY MOUTH ONCE DAILY AT BEDTIME 15 tablet 0    cetirizine (ZyrTEC) 10 mg tablet Take 0.5 tablets (5 mg) by mouth once daily.      folic acid (Folvite) 1 mg tablet Take 1 tablet (1 mg) by mouth once daily.      levothyroxine (Synthroid, Levoxyl) 50 mcg tablet Take 1 tablet (50 mcg) by mouth early in the morning.. Take on an empty stomach at the same time each day, either 30 to 60 minutes prior to breakfast 30 tablet 0    lisinopriL-hydrochlorothiazide 20-12.5 mg tablet Take 1 tablet by mouth once daily.      omeprazole (PriLOSEC) 40 mg DR capsule Take 1 capsule (40 mg) by mouth once daily in the morning. Take before meals. Do not crush or chew. 30 capsule 0     No current facility-administered medications on file prior to visit.

## 2025-06-06 ENCOUNTER — TELEMEDICINE (OUTPATIENT)
Dept: HEMATOLOGY/ONCOLOGY | Facility: CLINIC | Age: 70
End: 2025-06-06
Payer: MEDICARE

## 2025-06-06 DIAGNOSIS — F17.200 TOBACCO USE DISORDER: ICD-10-CM

## 2025-06-06 DIAGNOSIS — N28.9 KIDNEY LESION, NATIVE, LEFT: Primary | ICD-10-CM

## 2025-06-06 PROCEDURE — 1159F MED LIST DOCD IN RCRD: CPT | Performed by: NURSE PRACTITIONER

## 2025-06-06 PROCEDURE — 1160F RVW MEDS BY RX/DR IN RCRD: CPT | Performed by: NURSE PRACTITIONER

## 2025-06-06 PROCEDURE — 99204 OFFICE O/P NEW MOD 45 MIN: CPT | Performed by: NURSE PRACTITIONER

## 2025-06-06 ASSESSMENT — ENCOUNTER SYMPTOMS
FEVER: 0
FLANK PAIN: 0
DYSURIA: 0
UNEXPECTED WEIGHT CHANGE: 0
EXTREMITY WEAKNESS: 0
APPETITE CHANGE: 0
CHILLS: 0
ABDOMINAL PAIN: 0
SPEECH DIFFICULTY: 0
HEMATURIA: 0
ENDOCRINE COMMENTS: DENIES NIGHT SWEATS
SHORTNESS OF BREATH: 0

## 2025-06-10 ENCOUNTER — APPOINTMENT (OUTPATIENT)
Dept: SURGERY | Facility: CLINIC | Age: 70
End: 2025-06-10
Payer: MEDICARE

## 2025-06-10 VITALS
SYSTOLIC BLOOD PRESSURE: 165 MMHG | WEIGHT: 124.6 LBS | HEART RATE: 76 BPM | OXYGEN SATURATION: 94 % | BODY MASS INDEX: 19.52 KG/M2 | DIASTOLIC BLOOD PRESSURE: 81 MMHG

## 2025-06-10 DIAGNOSIS — K55.20 AVM (ARTERIOVENOUS MALFORMATION) OF COLON: Primary | ICD-10-CM

## 2025-06-10 DIAGNOSIS — D64.9 ANEMIA, UNSPECIFIED TYPE: ICD-10-CM

## 2025-06-10 PROCEDURE — 99214 OFFICE O/P EST MOD 30 MIN: CPT | Performed by: SURGERY

## 2025-06-10 PROCEDURE — 3079F DIAST BP 80-89 MM HG: CPT | Performed by: SURGERY

## 2025-06-10 PROCEDURE — 3077F SYST BP >= 140 MM HG: CPT | Performed by: SURGERY

## 2025-06-10 NOTE — PROGRESS NOTES
General Surgery Office Follow Up    Chief Complaint:  Follow up     Interval History:  Denys Denton is a 70 y.o. male who came today for follow up.   Patient was recently evaluated in ED on 6/4 for marked anemia with Hg at 4.8  He has known history of small bowel and colonic AVM which was felt to be the source for blood loss per ED and GI. He had transfusion of 3U of RBC. Hg went up to 9.1.   I was asked to see pt then due to CT findings of possible partial sigmoid colon volvulus and bilateral inguinal hernia. He was and is currently asymptomatic with no groin pain, abdominal pain and moves bowel regularly although he does have on and off issue with constipation. He moves bowel every other day per patient.   He came today for follow up and discussing management of hernia and sigmoid colon issue.     History of right colon resection for colon mass   Ventral hernia repair  Ex lap for SBO     Past Medical History:  Medical History[1]     Past Surgical History:  Surgical History[2]     Medications:  Current Outpatient Medications   Medication Instructions    amLODIPine (NORVASC) 5 mg, Daily    atorvastatin (LIPITOR) 40 mg, oral, Nightly    cetirizine (ZYRTEC) 5 mg, Daily    folic acid (FOLVITE) 1 mg, Daily RT    levothyroxine (SYNTHROID, LEVOXYL) 50 mcg, oral, Daily, Take on an empty stomach at the same time each day, either 30 to 60 minutes prior to breakfast    lisinopriL-hydrochlorothiazide 20-12.5 mg tablet 1 tablet, Daily    omeprazole (PRILOSEC) 40 mg, oral, Daily before breakfast, Do not crush or chew.        Allergies:  RX Allergies[3]     Family History:  Family History[4]     Social History:  Social History[5]     Review of Systems:  A complete 12 point review of systems was performed. It is otherwise negative except as noted in the above interval history.     Vital Signs:  Vitals:    06/10/25 1310   BP: 165/81   Pulse: 76   SpO2: 94%      Body mass index is 19.52 kg/m².      Physical Exam:  General: No  acute distress.   Neuro: Alert and oriented ×3. Follows commands.  Face: Atraumatic, no visible skin lesion.   Head: Atraumatic  Eyes: Pupils equal reactive to light. Extraocular motions intact.  Ears: Hears normal speaking voice.  Mouth, Nose, Throat: Mucous membranes moist.    Neck: Supple. No visible masses.  Breast: Not examined.   Lung: Patent airway and no labored breath.   Vascular: Palpable radial pulses bilaterally.  Abdomen: soft, NT, ND. Well healed surgical scar at midline.    Groins: reducible small IH.   Rectal and Perianal: Not examined.   Genitourinary: Not examined.   Musculoskeletal: Moves all extremities.     Extremities: No cyanosis.   Lymphatic: No palpable lymph nodes.  Skin: No rashes or lesions.  Psychological: Normal affect      Laboratory Values:  CBC:   Lab Results   Component Value Date    WBC 6.5 06/03/2025    WBC 5.1 04/01/2025    RBC 2.06 (L) 06/03/2025    RBC 2.70 (L) 04/01/2025    HGB 9.1 (L) 06/03/2025    HGB 7.3 (L) 04/01/2025    HCT 28.9 (L) 06/03/2025    HCT 23.9 (L) 04/01/2025     06/03/2025     04/01/2025       RFP:   Lab Results   Component Value Date     (L) 06/03/2025     (L) 04/01/2025    K 3.9 06/03/2025    K 4.2 04/01/2025     06/03/2025     04/01/2025    CO2 20 (L) 06/03/2025    CO2 24 04/01/2025    BUN 23 06/03/2025    BUN 15 04/01/2025    CREATININE 1.16 06/03/2025    CREATININE 0.91 04/01/2025    CALCIUM 8.5 (L) 06/03/2025    CALCIUM 8.6 04/01/2025    MG 1.89 06/03/2025        LFTs:   Lab Results   Component Value Date    PROT 8.3 (H) 06/03/2025    PROT 8.5 (H) 04/01/2025    ALBUMIN 3.7 06/03/2025    ALBUMIN 3.9 04/01/2025    BILITOT 0.3 06/03/2025    BILITOT 0.3 04/01/2025    BILIDIR 0.0 12/15/2023    ALKPHOS 61 06/03/2025    ALKPHOS 47 04/01/2025    AST 20 06/03/2025    AST 29 04/01/2025    ALT 11 06/03/2025    ALT 13 04/01/2025            Imaging:  I have personally reviewed the images and the radiologist's report.  Imaging  CT  angio abdomen pelvis w and or wo IV IV contrast  Result Date: 6/3/2025  1. No evidence of active gastrointestinal hemorrhage.   2. Approximately 180 degree swirling of the sigmoid mesentery representing a partial volvulus with gaseous distention of the sigmoid colon extending far into the upper abdomen but no evidence of upstream bowel dilatation suggests high-grade obstruction at this time.   3. Diffuse fluid-filled small bowel could relate to malabsorption or enteritis.   4. Pulmonary edema and moderate cardiomegaly.   5. Intermediate density mass in the left kidney measuring 1 cm that is favored to represent a proteinaceous/hemorrhagic cyst with pseudo enhancement versus true enhancement but difficult to exclude solid neoplasm. This could be further characterized with a nonemergent ultrasound or renal mass protocol MRI with contrast. YELLOW ALERT: An incidental finding alert was sent per protocol.   6. Small wide neck bilateral direct inguinal hernias containing small bowel in the left and bladder wall on the right.   MACRO: None.   Signed by: Donte Souza 6/3/2025 8:27 PM Dictation workstation:   [id number]      Cardiology, Vascular, and Other Imaging  No other imaging results found for the past 7 days        Assessment:  This is a 70 y.o. 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   Patient is scheduled to see pcp Dr Jacky Doty in July   His hernias do not need urgent repair. He will come back to discuss hernia surgery: robotic vs open surgery after above. He was instructed to call or ED if s/s  incarceration, obstruction and strangulation.       Kt Mast MD [facility]  General Surgery  Office: [phone]  Fax:     [phone]  2:30 PM   06/10/25        [1]   Past Medical History:  Diagnosis Date    Personal history of other diseases of the circulatory system     History of hypertension    Personal history of other specified conditions 08/30/2022    History of chest pain    Personal history of transient ischemic attack (TIA), and cerebral infarction without residual deficits 05/05/2021    History of cerebrovascular accident    Pure hypercholesterolemia, unspecified 08/30/2022    Hypercholesterolemia   [2]   Past Surgical History:  Procedure Laterality Date    COLOSTOMY  05/08/2014    Colostomy    KNEE SURGERY  05/08/2014    Knee Surgery    MR HEAD ANGIO WO IV CONTRAST  12/15/2023    MR HEAD ANGIO WO IV CONTRAST 12/15/2023 GEN MRI    MR NECK ANGIO WO IV CONTRAST  12/15/2023    MR NECK ANGIO WO IV CONTRAST 12/15/2023 GEN MRI    OTHER SURGICAL HISTORY  05/05/2021    Carpal tunnel surgery    OTHER SURGICAL HISTORY  05/05/2021    Sinus surgery    OTHER SURGICAL HISTORY  06/23/2021    Complete colonoscopy    OTHER SURGICAL HISTORY  06/17/2014    Carotid Artery Catheterization   [3]   Allergies  Allergen Reactions    Clopidogrel GI bleeding and Other     May/June 2013    Clopidogrel Bisulfate Other     Bleeding    Sulfa (Sulfonamide Antibiotics) Hives and Rash    Sulfamethoxazole-Trimethoprim Rash and Unknown   [4] No family history on file.  [5]   Social History  Socioeconomic History    Marital status:    Tobacco Use    Smoking status: Every Day     Current packs/day: 1.00     Types: Cigarettes     Passive exposure: Current    Smokeless tobacco: Never   Vaping Use    Vaping status: Never Used   Substance and Sexual Activity    Alcohol use: Yes     Alcohol/week: 28.0 standard drinks of alcohol     Types: 28 Cans of beer per week     Comment: daily colleen drinker 2-3 x many years    Drug use: Never    Sexual  activity: Defer     Social Drivers of Health     Financial Resource Strain: Low Risk  (6/4/2025)    Overall Financial Resource Strain (CARDIA)     Difficulty of Paying Living Expenses: Not hard at all   Food Insecurity: No Food Insecurity (12/15/2023)    Hunger Vital Sign     Worried About Running Out of Food in the Last Year: Never true     Ran Out of Food in the Last Year: Never true   Transportation Needs: No Transportation Needs (6/4/2025)    PRAPARE - Transportation     Lack of Transportation (Medical): No     Lack of Transportation (Non-Medical): No   Stress: No Stress Concern Present (12/15/2023)    [employer] [place] [employer] - Occupational Stress Questionnaire     Feeling of Stress : Not at all   Social Connections: Unknown (12/15/2023)    Social Connection and Isolation Panel [NHANES]     Marital Status:    Intimate Partner Violence: Not At Risk (12/15/2023)    Humiliation, Afraid, Rape, and Kick questionnaire     Fear of Current or Ex-Partner: No     Emotionally Abused: No     Physically Abused: No     Sexually Abused: No   Housing Stability: Low Risk  (6/4/2025)    Housing Stability Vital Sign     Unable to Pay for Housing in the Last Year: No     Number of Times Moved in the Last Year: 0     Homeless in the Last Year: No

## 2025-06-18 ENCOUNTER — APPOINTMENT (OUTPATIENT)
Facility: HOSPITAL | Age: 70
End: 2025-06-18
Payer: MEDICARE

## 2025-06-18 DIAGNOSIS — F17.200 TOBACCO USE DISORDER: ICD-10-CM

## 2025-06-18 DIAGNOSIS — N28.9 KIDNEY LESION, NATIVE, LEFT: ICD-10-CM

## 2025-06-18 PROCEDURE — 2550000001 HC RX 255 CONTRASTS: Performed by: NURSE PRACTITIONER

## 2025-06-18 PROCEDURE — 74183 MRI ABD W/O CNTR FLWD CNTR: CPT

## 2025-06-18 PROCEDURE — 74183 MRI ABD W/O CNTR FLWD CNTR: CPT | Performed by: RADIOLOGY

## 2025-06-18 PROCEDURE — A9575 INJ GADOTERATE MEGLUMI 0.1ML: HCPCS | Performed by: NURSE PRACTITIONER

## 2025-06-18 RX ORDER — GADOTERATE MEGLUMINE 376.9 MG/ML
11 INJECTION INTRAVENOUS
Status: COMPLETED | OUTPATIENT
Start: 2025-06-18 | End: 2025-06-18

## 2025-06-18 RX ADMIN — GADOTERATE MEGLUMINE 11 ML: 376.9 INJECTION INTRAVENOUS at 09:54

## 2025-07-02 ENCOUNTER — OFFICE VISIT (OUTPATIENT)
Dept: PRIMARY CARE | Facility: CLINIC | Age: 70
End: 2025-07-02
Payer: MEDICARE

## 2025-07-02 VITALS
DIASTOLIC BLOOD PRESSURE: 72 MMHG | HEART RATE: 64 BPM | HEIGHT: 65 IN | BODY MASS INDEX: 20.53 KG/M2 | SYSTOLIC BLOOD PRESSURE: 144 MMHG | WEIGHT: 123.2 LBS

## 2025-07-02 DIAGNOSIS — N18.2 STAGE 2 CHRONIC KIDNEY DISEASE: ICD-10-CM

## 2025-07-02 DIAGNOSIS — J44.9 CHRONIC OBSTRUCTIVE PULMONARY DISEASE, UNSPECIFIED COPD TYPE (MULTI): ICD-10-CM

## 2025-07-02 DIAGNOSIS — E55.9 VITAMIN D DEFICIENCY: ICD-10-CM

## 2025-07-02 DIAGNOSIS — E78.00 HYPERCHOLESTEROLEMIA: ICD-10-CM

## 2025-07-02 DIAGNOSIS — I25.10 CORONARY ARTERY DISEASE INVOLVING NATIVE CORONARY ARTERY OF NATIVE HEART WITHOUT ANGINA PECTORIS: ICD-10-CM

## 2025-07-02 DIAGNOSIS — E03.9 ACQUIRED HYPOTHYROIDISM: ICD-10-CM

## 2025-07-02 DIAGNOSIS — I10 PRIMARY HYPERTENSION: Primary | ICD-10-CM

## 2025-07-02 DIAGNOSIS — D50.9 IRON DEFICIENCY ANEMIA, UNSPECIFIED IRON DEFICIENCY ANEMIA TYPE: ICD-10-CM

## 2025-07-02 PROBLEM — R63.4 ABNORMAL WEIGHT LOSS: Status: RESOLVED | Noted: 2023-12-14 | Resolved: 2025-07-02

## 2025-07-02 PROBLEM — R06.02 SHORTNESS OF BREATH: Status: RESOLVED | Noted: 2023-12-14 | Resolved: 2025-07-02

## 2025-07-02 PROBLEM — R53.83 FATIGUE: Status: RESOLVED | Noted: 2023-12-14 | Resolved: 2025-07-02

## 2025-07-02 PROBLEM — E78.5 HYPERLIPIDEMIA: Status: RESOLVED | Noted: 2023-12-14 | Resolved: 2025-07-02

## 2025-07-02 PROBLEM — F10.10 HARMFUL USE OF ALCOHOL: Status: ACTIVE | Noted: 2025-07-02

## 2025-07-02 PROBLEM — D64.9 ANEMIA: Status: RESOLVED | Noted: 2023-12-14 | Resolved: 2025-07-02

## 2025-07-02 PROBLEM — I65.29 CAROTID ARTERY STENOSIS: Status: RESOLVED | Noted: 2023-12-14 | Resolved: 2025-07-02

## 2025-07-02 PROCEDURE — 3077F SYST BP >= 140 MM HG: CPT | Performed by: FAMILY MEDICINE

## 2025-07-02 PROCEDURE — 3078F DIAST BP <80 MM HG: CPT | Performed by: FAMILY MEDICINE

## 2025-07-02 PROCEDURE — 3008F BODY MASS INDEX DOCD: CPT | Performed by: FAMILY MEDICINE

## 2025-07-02 PROCEDURE — 1159F MED LIST DOCD IN RCRD: CPT | Performed by: FAMILY MEDICINE

## 2025-07-02 PROCEDURE — 99204 OFFICE O/P NEW MOD 45 MIN: CPT | Performed by: FAMILY MEDICINE

## 2025-07-02 PROCEDURE — 99214 OFFICE O/P EST MOD 30 MIN: CPT | Performed by: FAMILY MEDICINE

## 2025-07-02 PROCEDURE — 1160F RVW MEDS BY RX/DR IN RCRD: CPT | Performed by: FAMILY MEDICINE

## 2025-07-02 PROCEDURE — 1125F AMNT PAIN NOTED PAIN PRSNT: CPT | Performed by: FAMILY MEDICINE

## 2025-07-02 RX ORDER — BRIMONIDINE TARTRATE AND TIMOLOL MALEATE 2; 5 MG/ML; MG/ML
1 SOLUTION OPHTHALMIC EVERY 12 HOURS SCHEDULED
COMMUNITY
Start: 2024-12-17 | End: 2025-07-02 | Stop reason: ALTCHOICE

## 2025-07-02 ASSESSMENT — PAIN SCALES - GENERAL: PAINLEVEL_OUTOF10: 5

## 2025-07-02 ASSESSMENT — PATIENT HEALTH QUESTIONNAIRE - PHQ9
1. LITTLE INTEREST OR PLEASURE IN DOING THINGS: NOT AT ALL
SUM OF ALL RESPONSES TO PHQ9 QUESTIONS 1 AND 2: 0
2. FEELING DOWN, DEPRESSED OR HOPELESS: NOT AT ALL

## 2025-07-02 NOTE — PATIENT INSTRUCTIONS
Check the blood test today. Will adjust the thyroid medication as needed.     Keep appointments with cardiology, gastroenterology and hematology.     Follow the blood pressure at home and call me or Dr. Chapin if this is > 140/90 consistently.

## 2025-07-02 NOTE — PROGRESS NOTES
"Subjective   Patient ID: RUBI Denton is a 70 y.o. male who presents for [facility].    HPI   He presents today to establish as a patient.  He does have a venous malformation in the small bowel which causes anemia.  Last hemoglobin was 9.1 in the beginning of June and his iron was okay.  Ferritin was 45.  Also had a CMP in June.  Does have a low sodium which is apparently chronic for him going back to 2021.  He has chronic kidney disease listed on his chart but his last GFR was 67.  Cholesterol was checked in April and looks very good with a cholesterol HDL ratio of 2.6 and an HDL of 65.  He recently had an MRI for a concerning lesion in the kidney that was found out to be a Bosniak 2 hemorrhagic cyst  TSH was > 150 and t4 ws 0.2.     Works as a .   He does see cardiology regularly.    Review of Systems  Denies any chest pain or any shortness of breath today.  States his fatigue has been better lately since he had the transfusion.  Is not complaining of diarrhea or constipation.  No swelling of the feet.    Objective   /72   Pulse 64   Ht 1.638 m (5' 4.5\")   Wt 55.9 kg (123 lb 3.2 oz)   BMI 20.82 kg/m²     Physical Exam  Constitutional:       Appearance: Normal appearance.   Neck:      Thyroid: No thyromegaly or thyroid tenderness.      Vascular: No carotid bruit.   Cardiovascular:      Rate and Rhythm: Normal rate and regular rhythm.      Heart sounds: No murmur heard.  Pulmonary:      Effort: Pulmonary effort is normal.      Breath sounds: Normal breath sounds.   Musculoskeletal:      Cervical back: Neck supple.   Neurological:      Mental Status: He is alert.   Psychiatric:         Mood and Affect: Mood normal.         Assessment/Plan   Diagnoses and all orders for this visit:  Primary hypertension  Acquired hypothyroidism  -     TSH with reflex to Free T4 if abnormal; Future  Coronary artery disease involving native coronary artery of native heart without angina " pectoris  Hypercholesterolemia  Vitamin D deficiency  Iron deficiency anemia, unspecified iron deficiency anemia type  -     Hemoglobin and Hematocrit, Blood; Future  Chronic obstructive pulmonary disease, unspecified COPD type (Multi)  Stage 2 chronic kidney disease  He will continue to follow with cardiology, gastroenterology and hematology.  I will recheck his H&H today to make sure it is held up since he had the transfusion.  Was last at 9.  Will recheck the TSH since has been on the thyroid medicine for 6 weeks now.  Will make sure we get this up into a good range as that certainly contributing to his fatigue.  He will continue his current medications.  He can follow-up with me in 6 months though we will be following the labs and contact him with those in the meantime.  I did discuss with him that though he is diagnosed with CKD level 3, he is really just the level 2 right now as his GFR is now above 60.

## 2025-07-03 LAB
HCT VFR BLD AUTO: 28.5 % (ref 38.5–50)
HGB BLD-MCNC: 8.5 G/DL (ref 13.2–17.1)
T4 FREE SERPL-MCNC: 0.2 NG/DL (ref 0.8–1.8)
TSH SERPL-ACNC: 135.86 MIU/L (ref 0.4–4.5)

## 2025-07-08 DIAGNOSIS — E03.9 HYPOTHYROIDISM, UNSPECIFIED TYPE: ICD-10-CM

## 2025-07-08 RX ORDER — LEVOTHYROXINE SODIUM 50 UG/1
50 TABLET ORAL DAILY
Qty: 30 TABLET | Refills: 0 | Status: SHIPPED | OUTPATIENT
Start: 2025-07-08 | End: 2025-09-06

## 2025-08-04 ENCOUNTER — OFFICE VISIT (OUTPATIENT)
Dept: HEMATOLOGY/ONCOLOGY | Facility: CLINIC | Age: 70
End: 2025-08-04
Payer: MEDICARE

## 2025-08-04 VITALS
SYSTOLIC BLOOD PRESSURE: 164 MMHG | HEART RATE: 52 BPM | RESPIRATION RATE: 16 BRPM | DIASTOLIC BLOOD PRESSURE: 78 MMHG | HEIGHT: 64 IN | TEMPERATURE: 97.5 F | WEIGHT: 125.22 LBS | OXYGEN SATURATION: 98 % | BODY MASS INDEX: 21.38 KG/M2

## 2025-08-04 DIAGNOSIS — N18.9 CHRONIC KIDNEY DISEASE, UNSPECIFIED CKD STAGE: ICD-10-CM

## 2025-08-04 DIAGNOSIS — D64.9 ANEMIA, UNSPECIFIED TYPE: Primary | ICD-10-CM

## 2025-08-04 DIAGNOSIS — D50.0 IRON DEFICIENCY ANEMIA DUE TO CHRONIC BLOOD LOSS: ICD-10-CM

## 2025-08-04 DIAGNOSIS — E03.9 HYPOTHYROIDISM, UNSPECIFIED TYPE: ICD-10-CM

## 2025-08-04 DIAGNOSIS — Q27.30 AVM (ARTERIOVENOUS MALFORMATION) (HHS-HCC): ICD-10-CM

## 2025-08-04 DIAGNOSIS — R59.1 LYMPHADENOPATHY: ICD-10-CM

## 2025-08-04 DIAGNOSIS — D64.89 ANEMIA DUE TO OTHER CAUSE, NOT CLASSIFIED: ICD-10-CM

## 2025-08-04 DIAGNOSIS — K90.49 MALABSORPTION DUE TO INTOLERANCE, NOT ELSEWHERE CLASSIFIED: ICD-10-CM

## 2025-08-04 LAB
ALBUMIN SERPL BCP-MCNC: 3.7 G/DL (ref 3.4–5)
ALP SERPL-CCNC: 41 U/L (ref 33–136)
ALT SERPL W P-5'-P-CCNC: 21 U/L (ref 10–52)
ANION GAP SERPL CALC-SCNC: 12 MMOL/L (ref 10–20)
AST SERPL W P-5'-P-CCNC: 48 U/L (ref 9–39)
BASOPHILS # BLD AUTO: 0.03 X10*3/UL (ref 0–0.1)
BASOPHILS NFR BLD AUTO: 0.9 %
BILIRUB SERPL-MCNC: 0.3 MG/DL (ref 0–1.2)
BUN SERPL-MCNC: 20 MG/DL (ref 6–23)
CALCIUM SERPL-MCNC: 9.1 MG/DL (ref 8.6–10.3)
CHLORIDE SERPL-SCNC: 101 MMOL/L (ref 98–107)
CO2 SERPL-SCNC: 22 MMOL/L (ref 21–32)
CREAT SERPL-MCNC: 1.17 MG/DL (ref 0.5–1.3)
CRP SERPL-MCNC: 0.44 MG/DL
DACRYOCYTES BLD QL SMEAR: NORMAL
DAT-POLYSPECIFIC: NORMAL
EGFRCR SERPLBLD CKD-EPI 2021: 66 ML/MIN/1.73M*2
EOSINOPHIL # BLD AUTO: 0.13 X10*3/UL (ref 0–0.4)
EOSINOPHIL NFR BLD AUTO: 3.8 %
ERYTHROCYTE [DISTWIDTH] IN BLOOD BY AUTOMATED COUNT: 20 % (ref 11.5–14.5)
ERYTHROCYTE [SEDIMENTATION RATE] IN BLOOD BY WESTERGREN METHOD: 57 MM/H (ref 0–20)
FERRITIN SERPL-MCNC: 66 NG/ML (ref 20–300)
FOLATE SERPL-MCNC: 11.3 NG/ML
GLUCOSE SERPL-MCNC: 90 MG/DL (ref 74–99)
HAPTOGLOB SERPL NEPH-MCNC: 110 MG/DL (ref 30–200)
HCT VFR BLD AUTO: 22.4 % (ref 41–52)
HGB BLD-MCNC: 6.7 G/DL (ref 13.5–17.5)
HGB RETIC QN: 20 PG (ref 28–38)
HYPOCHROMIA BLD QL SMEAR: NORMAL
IGA SERPL-MCNC: 296 MG/DL (ref 70–400)
IGG SERPL-MCNC: 2810 MG/DL (ref 700–1600)
IGM SERPL-MCNC: 85 MG/DL (ref 40–230)
IMM GRANULOCYTES # BLD AUTO: 0.01 X10*3/UL (ref 0–0.5)
IMM GRANULOCYTES NFR BLD AUTO: 0.3 % (ref 0–0.9)
IMMATURE RETIC FRACTION: 25.5 %
IRON SATN MFR SERPL: 2 % (ref 25–45)
IRON SERPL-MCNC: 11 UG/DL (ref 35–150)
LDH SERPL L TO P-CCNC: 251 U/L (ref 84–246)
LYMPHOCYTES # BLD AUTO: 0.78 X10*3/UL (ref 0.8–3)
LYMPHOCYTES NFR BLD AUTO: 22.6 %
MCH RBC QN AUTO: 25 PG (ref 26–34)
MCHC RBC AUTO-ENTMCNC: 29.9 G/DL (ref 32–36)
MCV RBC AUTO: 84 FL (ref 80–100)
MONOCYTES # BLD AUTO: 0.19 X10*3/UL (ref 0.05–0.8)
MONOCYTES NFR BLD AUTO: 5.5 %
NEUTROPHILS # BLD AUTO: 2.31 X10*3/UL (ref 1.6–5.5)
NEUTROPHILS NFR BLD AUTO: 66.9 %
NRBC BLD-RTO: ABNORMAL /100{WBCS}
PLATELET # BLD AUTO: 246 X10*3/UL (ref 150–450)
POTASSIUM SERPL-SCNC: 3.7 MMOL/L (ref 3.5–5.3)
PROT SERPL-MCNC: 8.6 G/DL (ref 6.4–8.2)
PROT SERPL-MCNC: 8.8 G/DL (ref 6.4–8.2)
RBC # BLD AUTO: 2.68 X10*6/UL (ref 4.5–5.9)
RBC MORPH BLD: NORMAL
RETICS #: 0.06 X10*6/UL (ref 0.02–0.11)
RETICS/RBC NFR AUTO: 2.3 % (ref 0.5–2)
SCHISTOCYTES BLD QL SMEAR: NORMAL
SODIUM SERPL-SCNC: 131 MMOL/L (ref 136–145)
TIBC SERPL-MCNC: 447 UG/DL (ref 240–445)
UIBC SERPL-MCNC: 436 UG/DL (ref 110–370)
VIT B12 SERPL-MCNC: 476 PG/ML (ref 211–911)
WBC # BLD AUTO: 3.5 X10*3/UL (ref 4.4–11.3)

## 2025-08-04 PROCEDURE — 1160F RVW MEDS BY RX/DR IN RCRD: CPT

## 2025-08-04 PROCEDURE — 83550 IRON BINDING TEST: CPT

## 2025-08-04 PROCEDURE — 86880 COOMBS TEST DIRECT: CPT

## 2025-08-04 PROCEDURE — 84439 ASSAY OF FREE THYROXINE: CPT

## 2025-08-04 PROCEDURE — 85025 COMPLETE CBC W/AUTO DIFF WBC: CPT

## 2025-08-04 PROCEDURE — 82746 ASSAY OF FOLIC ACID SERUM: CPT | Mod: GEALAB

## 2025-08-04 PROCEDURE — 99417 PROLNG OP E/M EACH 15 MIN: CPT

## 2025-08-04 PROCEDURE — 81450 HL NEO GSAP 5-50DNA/DNA&RNA: CPT

## 2025-08-04 PROCEDURE — 36415 COLL VENOUS BLD VENIPUNCTURE: CPT

## 2025-08-04 PROCEDURE — 80053 COMPREHEN METABOLIC PANEL: CPT

## 2025-08-04 PROCEDURE — 83615 LACTATE (LD) (LDH) ENZYME: CPT

## 2025-08-04 PROCEDURE — 3008F BODY MASS INDEX DOCD: CPT

## 2025-08-04 PROCEDURE — 82668 ASSAY OF ERYTHROPOIETIN: CPT

## 2025-08-04 PROCEDURE — 99215 OFFICE O/P EST HI 40 MIN: CPT

## 2025-08-04 PROCEDURE — 85652 RBC SED RATE AUTOMATED: CPT

## 2025-08-04 PROCEDURE — 83521 IG LIGHT CHAINS FREE EACH: CPT | Mod: GEALAB

## 2025-08-04 PROCEDURE — 84155 ASSAY OF PROTEIN SERUM: CPT | Mod: GEALAB

## 2025-08-04 PROCEDURE — 84207 ASSAY OF VITAMIN B-6: CPT

## 2025-08-04 PROCEDURE — 82784 ASSAY IGA/IGD/IGG/IGM EACH: CPT | Mod: GEALAB

## 2025-08-04 PROCEDURE — 82607 VITAMIN B-12: CPT | Mod: GEALAB

## 2025-08-04 PROCEDURE — 84443 ASSAY THYROID STIM HORMONE: CPT

## 2025-08-04 PROCEDURE — 86140 C-REACTIVE PROTEIN: CPT

## 2025-08-04 PROCEDURE — 82728 ASSAY OF FERRITIN: CPT

## 2025-08-04 PROCEDURE — 85045 AUTOMATED RETICULOCYTE COUNT: CPT

## 2025-08-04 PROCEDURE — 83010 ASSAY OF HAPTOGLOBIN QUANT: CPT | Mod: GEALAB

## 2025-08-04 PROCEDURE — 1126F AMNT PAIN NOTED NONE PRSNT: CPT

## 2025-08-04 PROCEDURE — 1159F MED LIST DOCD IN RCRD: CPT

## 2025-08-04 RX ORDER — EPINEPHRINE 0.3 MG/.3ML
0.3 INJECTION SUBCUTANEOUS EVERY 5 MIN PRN
OUTPATIENT
Start: 2025-08-06

## 2025-08-04 RX ORDER — ALBUTEROL SULFATE 0.83 MG/ML
3 SOLUTION RESPIRATORY (INHALATION) AS NEEDED
Status: CANCELLED | OUTPATIENT
Start: 2025-08-05

## 2025-08-04 RX ORDER — HEPARIN SODIUM,PORCINE/PF 10 UNIT/ML
50 SYRINGE (ML) INTRAVENOUS AS NEEDED
Status: CANCELLED | OUTPATIENT
Start: 2025-08-04

## 2025-08-04 RX ORDER — EPINEPHRINE 0.3 MG/.3ML
0.3 INJECTION SUBCUTANEOUS EVERY 5 MIN PRN
Status: CANCELLED | OUTPATIENT
Start: 2025-08-05

## 2025-08-04 RX ORDER — HEPARIN 100 UNIT/ML
500 SYRINGE INTRAVENOUS AS NEEDED
Status: CANCELLED | OUTPATIENT
Start: 2025-08-04

## 2025-08-04 RX ORDER — ALBUTEROL SULFATE 0.83 MG/ML
3 SOLUTION RESPIRATORY (INHALATION) AS NEEDED
OUTPATIENT
Start: 2025-08-06

## 2025-08-04 RX ORDER — FAMOTIDINE 10 MG/ML
20 INJECTION, SOLUTION INTRAVENOUS ONCE AS NEEDED
Status: CANCELLED | OUTPATIENT
Start: 2025-08-05

## 2025-08-04 RX ORDER — DIPHENHYDRAMINE HYDROCHLORIDE 50 MG/ML
50 INJECTION, SOLUTION INTRAMUSCULAR; INTRAVENOUS AS NEEDED
OUTPATIENT
Start: 2025-08-06

## 2025-08-04 RX ORDER — DIPHENHYDRAMINE HYDROCHLORIDE 50 MG/ML
50 INJECTION, SOLUTION INTRAMUSCULAR; INTRAVENOUS AS NEEDED
Status: CANCELLED | OUTPATIENT
Start: 2025-08-05

## 2025-08-04 RX ORDER — FAMOTIDINE 10 MG/ML
20 INJECTION, SOLUTION INTRAVENOUS ONCE AS NEEDED
OUTPATIENT
Start: 2025-08-06

## 2025-08-04 ASSESSMENT — PAIN SCALES - GENERAL: PAINLEVEL_OUTOF10: 0-NO PAIN

## 2025-08-04 NOTE — PROGRESS NOTES
[facility]    PATIENT VISIT INFORMATION    Visit Type: New Visit    Referring Provider: Juan Doty MD  Reason for referral: Anemia r/t AVM  Primary Practice Provider: Juan Doty MD    CANCER/HEMATOLGOY HISTORY    70-year-old  male presents for evaluation of anemia.  He has been referred by his primary Dr. Doty.  Extensive history with multiple hospitalizations over the last few years.  Anemia requiring blood transfusions several times this past year, most recently Mily 3, 2025 in which she received 2 units of packed red blood cells.  Hemoglobin was down to 4.7.  Anemia dates back 6 years.  He was seen at [facility] for AVM of the colon and several bowel blockages since 2013.  History includes colectomy in 2013.  Pathology was negative for neoplasm.  BNP has been elevated over several years most recently assessed in Mily 3, 2025.  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    Additional medical history includes bilateral carotid artery stenosis, CAD, hypercholesteremia, hyponatremia, hypothyroidism, vitamin D deficiency, protein calorie malnutrition, GERD, volvulus, acute kidney injury, stage III chronic kidney disease, renal mass benign, iron deficiency anemia, COVID infection, excessive use of alcohol, arthritis of knee, osteoarthritis of left knee, TIA, acute metabolic stroke, COPD.    Colonoscopy 5/7/2021 shows hemorrhoids mild to moderate, a single medium sized localized  angioectasia with stigmata of recent bleeding in the ascending colon. Repeat colonoscopy in 5 years for surveillance.   Enteroscopy  5/7/2025    Normal esophagus.  - Normal stomach.  - 2 cm hiatal hernia.  - Two non-bleeding angioectasias in the duodenum.  - A single non-bleeding angioectasia in the jejunum.  Transthoracic Echo (TTE) Complete  12/15/2023  CONCLUSIONS:   1. Left ventricular systolic function is hyperdynamic with a 70% estimated ejection fraction.   2. Spectral  "Doppler shows an impaired relaxation pattern of left ventricular diastolic filling.   3. Mild aortic valve regurgitation.   4. CVP is low.  HISTORY OF PRESENT ILLNESS     ID Statement:     Chief Complaint:     Interval History:     \"Twisted bowel needs surgery.\" \"Hx several blockages. Constipated, not real regular.\"  Losing blood from AVM not seen other than dark stool.   \"SOB when ow on blood.\"  Fatigue and loss of energy.  Bad Eczema.   Cramp on shin at night.   Denies F/C/recent illness/infection, drenching night sweats, unintentional weight loss, anorexia/loss of appetite, HA, dizziness, lightheadedness, PICA, acute changes in vision/hearing, palpitations, CP, SOB, n/v/d/abd pain, bleeding, melena, urinary issues, haematuria, LAD, peripheral neuropathy, fatigue, loss of energy, bone pain, abnormal bruising or bleeding any location now, sores, itchy.    PAST/CURRENT HISTORY     MEDICAL/SURGICAL HISTORY  Medical History[1]   Problem List[2]   Surgical History[3]     SOCIAL HISTORY  -Lives with wife Leeanne and son (34 year old son healthy down syndrome)  -Work place: Work  (golf course maintenance)  -Tobacco/smokeless use: Smoker cigarettes 1 pk/day for 50 plus years  -Alcohol: Few beers per day  -Illicit drug or marijuana use: Denies   -Judaism or Spiritual beliefs: Denies   -Social Determinates of Health Concerns: NA    FAMILY HISTORY  -Mother  pancreatic cancer  -Father AVMs  from old age  -Sister AVMs receives blood transfusions and iron.  -No other known history of hematologic, bleeding, clotting, autoimmune, genetic, or malignant disorders in the family.     OCCUPATIONAL/ENVIRONMENTAL HISTORY/EXPOSURES:  -None reported    Active Problems, Allergy List, Medication List, and PMH/PSH/FH/Social Hx have been reviewed and reconciled in chart. Updates made when necessary.     REVIEW OF SYSTEMS   A review of systems has been completed and are negative for complaints except " "what is stated in the assessment, HPI, IH, ROS, and/or past medical history.    ALLERGIES AND MEDICATIONS     Allergies and Intolerances:   RX Allergies[4]   Medication Profile:   Current Outpatient Medications   Medication Instructions    amLODIPine (NORVASC) 5 mg, Daily    atorvastatin (LIPITOR) 40 mg, oral, Nightly    cetirizine (ZYRTEC) 5 mg, Daily    folic acid (FOLVITE) 1 mg, Daily RT    levothyroxine (SYNTHROID, LEVOXYL) 50 mcg, oral, Daily, Take on an empty stomach at the same time each day, either 30 to 60 minutes prior to breakfast    lisinopriL-hydrochlorothiazide 20-12.5 mg tablet 1 tablet, Daily      Available Vaccination Record:   Immunization History   Administered Date(s) Administered    COVID-19, mRNA, LNP-S, PF, 30 mcg/0.3 mL dose 03/24/2021, 04/23/2021    Tdap vaccine, age 7 year and older (BOOSTRIX, ADACEL) 01/21/2014      PHYSICAL EXAM     Vital Signs/Measurements:   Wt Readings from Last 5 Encounters:   08/04/25 56.8 kg (125 lb 3.5 oz)   07/02/25 55.9 kg (123 lb 3.2 oz)   06/10/25 56.5 kg (124 lb 9.6 oz)   06/03/25 56.7 kg (125 lb)   04/03/25 59 kg (130 lb)         6/4/2025    12:00 AM 6/4/2025     2:00 AM 6/4/2025     4:00 AM 6/4/2025     6:00 AM 6/10/2025     1:10 PM 7/2/2025    10:06 AM 8/4/2025     9:02 AM   Vitals   Systolic  107 124 126 165 144 164   Diastolic  49 51 65 81 72 78   BP Location       Left arm   Heart Rate 73 61   76 64 52   Temp       36.4 °C (97.5 °F)   Resp 17      16   Height      1.638 m (5' 4.5\") 1.628 m (5' 4.09\")    Weight (lb)     124.6 123.2 125.22   BMI     19.52 kg/m2 20.82 kg/m2 21.43 kg/m2   BSA (m2)     1.63 m2 1.59 m2 1.6 m2   Visit Report     Report Report Report       Significant value        Performance:   ECOG Performance Status: 0     Grade ECOG performance status   0 Fully active, able to carry on all pre-disease performance without restriction   1 Restricted in physically strenuous activity but ambulatory and able to carry out work of a light or " sedentary nature, e.g., light housework, office work   2 Ambulatory and capable of all selfcare but unable to carry out any work activities; Up and about more than 50% of waking hours   3 Capable of only limited selfcare, confined to bed or chair more than 50% of waking hours   4 Completely disabled; Cannot carry out any selfcare; Totally confined to bed or chair         Physical Exam:  General: Patient is awake/alert/oriented x3, no distress, no difficulty walking  Skin: Pallor,, good turgor, dry, no prominent lesions, rashes, unusual bruising, or bleeding   Hair: Normal texture and distribution   Nails: Normal color, no deformities    HEENT:   Head: Normocephalic, atraumatic, no visible masses  Eyes: Conjunctiva clear, sclera non-icteric, no exudates or hemorrhages   Ears: nl appearance, hearing intact    Nose: no external lesions, mucosa non-inflamed, no rhinorrhea   Mouth: Mucous membranes moist, no lesions, sores, bleeding, or erythema     Head/Neck: Neck supple, no apparent injury, thyroid without mass or tenderness, No JVD, trachea midline, no bruits appreciated    Respiratory/Thorax: Patent airways, CTAB, chest symmetry, normal inspiratory and expiratory effort    Cardiovascular: Regular rate and rhythm, no murmur or gallop   Gastrointestinal: Bowel sounds normal, firm distended, unable to appreciate organomegaly   Genitourinary: Deferred   Musculoskeletal: Normal gait, normal range of motion, no pain on palpation of spine, no deformity, normal strength, no atrophy, and no CVA tenderness appreciated   Extremities: No amputations or deformities, cyanosis, edema or viscosities, peripheral pulses intact   Neurological: Sensation present to touch, intact senses, motor response and reflexes normal, normal strength   Breast:    Lymphatic: No significant lymphadenopathy, palpable lump right carotid   Psychological: Intact recent and remote memory, judgement, and insight. Appropriate mood, affect, and behavior           RESULTS/DATA     Labs:     Lab Results   Component Value Date    WBC 3.5 (L) 08/04/2025    NEUTROABS 2.31 08/04/2025    IGABSOL 0.01 08/04/2025    LYMPHSABS 0.78 (L) 08/04/2025    MONOSABS 0.19 08/04/2025    EOSABS 0.13 08/04/2025    BASOSABS 0.03 08/04/2025    RBC 2.68 (L) 08/04/2025    MCV 84 08/04/2025    MCHC 29.9 (L) 08/04/2025    HGB 6.7 (L) 08/04/2025    HCT 22.4 (L) 08/04/2025     08/04/2025     Lab Results   Component Value Date    RETICCTPCT 2.3 (H) 08/04/2025      Lab Results   Component Value Date    CREATININE 1.17 08/04/2025    BUN 20 08/04/2025    EGFR 66 08/04/2025     (L) 08/04/2025    K 3.7 08/04/2025     08/04/2025    CO2 22 08/04/2025      Lab Results   Component Value Date    ALT 21 08/04/2025    AST 48 (H) 08/04/2025    ALKPHOS 41 08/04/2025    BILITOT 0.3 08/04/2025      Lab Results   Component Value Date    .86 (H) 07/02/2025     Lab Results   Component Value Date    .86 (H) 07/02/2025     Lab Results   Component Value Date    IRON 11 (L) 08/04/2025    TIBC 447 (H) 08/04/2025    FERRITIN 66 08/04/2025     Lab Results   Component Value Date    SEDRATE 57 (H) 08/04/2025      Lab Results   Component Value Date    CRP 0.44 08/04/2025        Lab Results   Component Value Date     (H) 08/04/2025        Radiology/Studies:   Please see above    ASSESSMENT/PLAN     Assessment and Plan:   #1.  Anemia  #2.  Chronic kidney disease  #3.  AVM of colon  #4.  Hypothyroidism  70-year-old  male presents for evaluation of anemia.  He has been referred by his primary Dr. Doty.  Extensive history with multiple hospitalizations over the last few years.  Anemia requiring blood transfusions several times this past year, most recently Mily 3, 2025 in which she received 2 units of packed red blood cells.  Hemoglobin was down to 4.7.  Anemia dates back 6 years.  He was seen at [facility] for AVM of the colon and several bowel blockages since  2013.  History includes colectomy in 2013.  Pathology was negative for neoplasm.  BNP has been elevated over several years most recently assessed in Mily 3, 2025.  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    Additional medical history includes bilateral carotid artery stenosis, CAD, hypercholesteremia, hyponatremia, hypothyroidism, vitamin D deficiency, protein calorie malnutrition, GERD, volvulus, acute kidney injury, stage III chronic kidney disease, renal mass benign, iron deficiency anemia, COVID infection, excessive use of alcohol, arthritis of knee, osteoarthritis of left knee, TIA, acute metabolic stroke, COPD.    Colonoscopy 5/7/2021 shows hemorrhoids mild to moderate, a single medium sized localized  angioectasia with stigmata of recent bleeding in the ascending colon. Repeat colonoscopy in 5 years for surveillance.   Enteroscopy  5/7/2025    Normal esophagus.  - Normal stomach.  - 2 cm hiatal hernia.  - Two non-bleeding angioectasias in the duodenum.  - A single non-bleeding angioectasia in the jejunum.  Transthoracic Echo (TTE) Complete  12/15/2023  CONCLUSIONS:   1. Left ventricular systolic function is hyperdynamic with a 70% estimated ejection fraction.   2. Spectral Doppler shows an impaired relaxation pattern of left ventricular diastolic filling.   3. Mild aortic valve regurgitation.   4. CVP is low.      Workup:  Discussed anemia etiology with patient and reviewed chronic disease, hemolysis, neoplasm, dysplasia of marrow, iron deficiency and nutritional deficiency, inflammatory and other etiologies.  Discussed workup with patient.  Hemoglobin 6.7 today patient asymptomatic.  Iron labs showed total iron of 11 with a saturation of 2 increased total binding with a ferritin of 66.  Blood transfusion and iron ordered STAT.  Patient to receive blood products tomorrow.  Further workup includes myeloid NGS and SPEP, kappa light chains and heavy chains. YESENIA, which was negative.  Further workup in process.  Discussed  the possibility for bone marrow biopsy.  Is possible the AVM is causing bleeding.  Patient denies any bright red stool however he does note darker stools upon occasion.  Alcohol cessation recommended.  Ultrasound of neck will be ordered for carotid lump palpated on right side.  Should patient become symptomatic, he should go to the emergency room for blood transfusion.    Discussion of Workup:    **Please follow with specialties as scheduled for other health needs and routine screenings.**    Problem List Items Addressed This Visit           ICD-10-CM    Anemia - Primary D64.9    Relevant Orders    CBC and Auto Differential (Completed)    Comprehensive Metabolic Panel (Completed)    Reticulocytes (Completed)    Haptoglobin    Lactate Dehydrogenase (Completed)    Direct Antiglobulin Test (YESENIA) (Completed)    Iron and TIBC with Transferrin Saturation (Completed)    Ferritin (Completed)    Serum Protein Electrophoresis + Immunofixation    [place]/Lambda Free Light Chain, Serum (includes ratio)    Erythropoietin    C-Reactive Protein (Completed)    Sedimentation Rate (Completed)    Folate    Vitamin B12    Vitamin B6    Myeloid Malignancies Panel - Myeloid NGS    Immunoglobulins (IgG, IgA, IgM)    Clinic Appointment Request Virtual Est (folow up review workup)    Morphology (Completed)    Hypothyroidism E03.9    Relevant Orders    CBC and Auto Differential (Completed)    Comprehensive Metabolic Panel (Completed)    Reticulocytes (Completed)    Haptoglobin    Lactate Dehydrogenase (Completed)    Direct Antiglobulin Test (YESENIA) (Completed)    Iron and TIBC with Transferrin Saturation (Completed)    Ferritin (Completed)    Serum Protein Electrophoresis + Immunofixation    Eschbach/Lambda Free Light Chain, Serum (includes ratio)    Erythropoietin    C-Reactive Protein (Completed)    Sedimentation Rate (Completed)    Folate    Vitamin B12    Vitamin B6    Myeloid Malignancies Panel - Myeloid NGS    Immunoglobulins (IgG, IgA, IgM)     Clinic Appointment Request Virtual Est (folow up review workup)    Morphology (Completed)    Other specified anemias D64.89    Iron deficiency anemia due to chronic blood loss D50.0    Malabsorption due to intolerance, not elsewhere classified K90.49     Other Visit Diagnoses         Codes      Chronic kidney disease, unspecified CKD stage     N18.9    Relevant Orders    CBC and Auto Differential (Completed)    Comprehensive Metabolic Panel (Completed)    Reticulocytes (Completed)    Haptoglobin    Lactate Dehydrogenase (Completed)    Direct Antiglobulin Test (YESENIA) (Completed)    Iron and TIBC with Transferrin Saturation (Completed)    Ferritin (Completed)    Serum Protein Electrophoresis + Immunofixation    [place]/Lambda Free Light Chain, Serum (includes ratio)    Erythropoietin    C-Reactive Protein (Completed)    Sedimentation Rate (Completed)    Folate    Vitamin B12    Vitamin B6    Myeloid Malignancies Panel - Myeloid NGS    Immunoglobulins (IgG, IgA, IgM)    Clinic Appointment Request Virtual Est (folow up review workup)    Morphology (Completed)      AVM (arteriovenous malformation) ([facility])     Q27.30    Relevant Orders    Clinic Appointment Request Virtual Est (folow up review workup)      Lymphadenopathy     R59.1           Follow up:    RTC:  - Follow-up 3 weeks to review workup    Medications:  - Venofer 300 mg x 3 doses stat (precert team notified)  - Blood transfusion for tomorrow 8/5/2025    Imaging/Testing:  - Ultrasound soft tissue neck    Referral(s):  - GI acute on chronic anemia    Other Pertinent Appointments:  - GI 8/15/2025      Patient Discussion Summary:  Discussed plan of care in detail. Patient states understanding and in agreement to the plan. Answered all questions to there liking. The patient will call with any additional questions and/or concerns.    Thank you for allowing me to participate in your care. It was a pleasure meeting you.    Sincerely,  Linda Velasco, APRN-CNP      Hematology/Oncology Clinical Nurse Practitioner     [facility]   [address]  [address]  [place] [address]  Office #: [phone]    DISCLAIMER:   In preparing for this visit and writing this note, I reviewed all the previous electronic medical records (testing, labs, imaging, and other procedures, provider notes, and medical charts) of the patient available in the physician portal pertinent to patient care. Significant findings which helped in decision making are recorded in this chart.      This document may have been written by voice recognition software.  There may be some incorrect wording, spelling and/or spelling errors or punctuation errors that were not corrected prior to committing the note to the medical record. Please request clarification if there is documentation error or clarification is needed.   Time based billing: Please see documentation within this specific encounter.       [1]   Past Medical History:  Diagnosis Date    Allergic 20 years ago    Anemia 20 years ago    Arthritis 30 years ago    Clotting disorder (Multi) 20 years ago    Eczema 2 years ago    GERD (gastroesophageal reflux disease) 20 years ago    Hypertension 30 years ago    Personal history of other diseases of the circulatory system     History of hypertension    Personal history of other specified conditions 08/30/2022    History of chest pain    Personal history of transient ischemic attack (TIA), and cerebral infarction without residual deficits 05/05/2021    History of cerebrovascular accident    Pure hypercholesterolemia, unspecified 08/30/2022    Hypercholesterolemia    Stroke (Multi) 20 years ago   [2]   Patient Active Problem List  Diagnosis    Amaurosis fugax of right eye    AVM (arteriovenous malformation) of colon    AVM (arteriovenous malformation) of duodenum, acquired    Bilateral carotid artery stenosis    CAD (coronary artery disease)    COVID-19 virus infection     Gastroesophageal reflux disease    Hypercholesterolemia    Hyponatremia    Hypothyroidism    Iron deficiency anemia    Acute kidney injury (CHARBEL) with acute tubular necrosis (ATN)    Vitamin D deficiency    Protein-calorie malnutrition, unspecified severity (Multi)    Chronic obstructive pulmonary disease, unspecified COPD type (Multi)    TIA (transient ischemic attack)    Acute embolic stroke (Multi)    Stage 3 chronic kidney disease, unspecified whether stage 3a or 3b CKD (Multi)    Arthritis of knee    Primary osteoarthritis of left knee    Volvulus (Multi)    Renal mass    Harmful use of alcohol   [3]   Past Surgical History:  Procedure Laterality Date    CARDIAC CATHETERIZATION  4 years ago    COLON SURGERY  20 years ago    COLOSTOMY  05/08/2014    Colostomy    JOINT REPLACEMENT  2 years ago    KNEE SURGERY  05/08/2014    Knee Surgery    MR HEAD ANGIO WO IV CONTRAST  12/15/2023    MR HEAD ANGIO WO IV CONTRAST 12/15/2023 GEN MRI    MR NECK ANGIO WO IV CONTRAST  12/15/2023    MR NECK ANGIO WO IV CONTRAST 12/15/2023 GEN MRI    OTHER SURGICAL HISTORY  05/05/2021    Carpal tunnel surgery    OTHER SURGICAL HISTORY  05/05/2021    Sinus surgery    OTHER SURGICAL HISTORY  06/23/2021    Complete colonoscopy    OTHER SURGICAL HISTORY  06/17/2014    Carotid Artery Catheterization    SINUS SURGERY  30 years ago    VASECTOMY  35 years ago   [4]   Allergies  Allergen Reactions    Clopidogrel GI bleeding and Other     May/June 2013    Clopidogrel Bisulfate Other     Bleeding    Sulfa (Sulfonamide Antibiotics) Hives and Rash    Sulfamethoxazole-Trimethoprim Rash and Unknown

## 2025-08-05 DIAGNOSIS — E03.9 HYPOTHYROIDISM, UNSPECIFIED TYPE: ICD-10-CM

## 2025-08-05 LAB
KAPPA LC SERPL-MCNC: 6.22 MG/DL (ref 0.33–1.94)
KAPPA LC/LAMBDA SER: 1.55 {RATIO} (ref 0.26–1.65)
LAMBDA LC SERPL-MCNC: 4.02 MG/DL (ref 0.57–2.63)
T4 FREE SERPL-MCNC: <0.32 NG/DL (ref 0.61–1.12)
TSH SERPL-ACNC: 163 MIU/L (ref 0.44–3.98)

## 2025-08-05 RX ORDER — LEVOTHYROXINE SODIUM 88 UG/1
88 TABLET ORAL DAILY
Qty: 60 TABLET | Refills: 0 | Status: SHIPPED | OUTPATIENT
Start: 2025-08-05 | End: 2025-10-04

## 2025-08-05 NOTE — PROGRESS NOTES
He is T4 is very low and his TSH is very high.  With increasing from 50 mcg up to 88 mcg and recheck again in 6 weeks.

## 2025-08-06 ENCOUNTER — TELEPHONE (OUTPATIENT)
Dept: PRIMARY CARE | Facility: CLINIC | Age: 70
End: 2025-08-06

## 2025-08-06 ENCOUNTER — INFUSION (OUTPATIENT)
Dept: HEMATOLOGY/ONCOLOGY | Facility: CLINIC | Age: 70
End: 2025-08-06
Payer: MEDICARE

## 2025-08-06 VITALS
BODY MASS INDEX: 21.53 KG/M2 | HEART RATE: 50 BPM | SYSTOLIC BLOOD PRESSURE: 161 MMHG | TEMPERATURE: 97.2 F | WEIGHT: 125.77 LBS | DIASTOLIC BLOOD PRESSURE: 75 MMHG | RESPIRATION RATE: 18 BRPM | OXYGEN SATURATION: 98 %

## 2025-08-06 DIAGNOSIS — D64.89 ANEMIA DUE TO OTHER CAUSE, NOT CLASSIFIED: ICD-10-CM

## 2025-08-06 DIAGNOSIS — E03.9 HYPOTHYROIDISM, UNSPECIFIED TYPE: ICD-10-CM

## 2025-08-06 DIAGNOSIS — D64.9 ANEMIA, UNSPECIFIED TYPE: ICD-10-CM

## 2025-08-06 LAB
ABO GROUP (TYPE) IN BLOOD: NORMAL
ANTIBODY SCREEN: NORMAL
BLOOD EXPIRATION DATE: NORMAL
DISPENSE STATUS: NORMAL
PRODUCT BLOOD TYPE: 600
PRODUCT CODE: NORMAL
RH FACTOR (ANTIGEN D): NORMAL
UNIT ABO: NORMAL
UNIT NUMBER: NORMAL
UNIT RH: NORMAL
UNIT VOLUME: 350
XM INTEP: NORMAL

## 2025-08-06 PROCEDURE — 86900 BLOOD TYPING SEROLOGIC ABO: CPT

## 2025-08-06 PROCEDURE — 36430 TRANSFUSION BLD/BLD COMPNT: CPT

## 2025-08-06 PROCEDURE — P9040 RBC LEUKOREDUCED IRRADIATED: HCPCS

## 2025-08-06 PROCEDURE — 86923 COMPATIBILITY TEST ELECTRIC: CPT

## 2025-08-06 RX ORDER — DIPHENHYDRAMINE HYDROCHLORIDE 50 MG/ML
50 INJECTION, SOLUTION INTRAMUSCULAR; INTRAVENOUS AS NEEDED
Status: DISCONTINUED | OUTPATIENT
Start: 2025-08-06 | End: 2025-08-06 | Stop reason: HOSPADM

## 2025-08-06 RX ORDER — EPINEPHRINE 0.3 MG/.3ML
0.3 INJECTION SUBCUTANEOUS EVERY 5 MIN PRN
Status: DISCONTINUED | OUTPATIENT
Start: 2025-08-06 | End: 2025-08-06 | Stop reason: HOSPADM

## 2025-08-06 RX ORDER — FAMOTIDINE 10 MG/ML
20 INJECTION, SOLUTION INTRAVENOUS ONCE AS NEEDED
Status: DISCONTINUED | OUTPATIENT
Start: 2025-08-06 | End: 2025-08-06 | Stop reason: HOSPADM

## 2025-08-06 RX ORDER — ALBUTEROL SULFATE 0.83 MG/ML
3 SOLUTION RESPIRATORY (INHALATION) AS NEEDED
Status: DISCONTINUED | OUTPATIENT
Start: 2025-08-06 | End: 2025-08-06 | Stop reason: HOSPADM

## 2025-08-06 RX ORDER — HEPARIN SODIUM,PORCINE/PF 10 UNIT/ML
50 SYRINGE (ML) INTRAVENOUS AS NEEDED
OUTPATIENT
Start: 2025-08-06

## 2025-08-06 RX ORDER — DIPHENHYDRAMINE HYDROCHLORIDE 50 MG/ML
50 INJECTION, SOLUTION INTRAMUSCULAR; INTRAVENOUS AS NEEDED
OUTPATIENT
Start: 2025-08-07

## 2025-08-06 RX ORDER — HEPARIN 100 UNIT/ML
500 SYRINGE INTRAVENOUS AS NEEDED
OUTPATIENT
Start: 2025-08-06

## 2025-08-06 RX ORDER — EPINEPHRINE 0.3 MG/.3ML
0.3 INJECTION SUBCUTANEOUS EVERY 5 MIN PRN
OUTPATIENT
Start: 2025-08-07

## 2025-08-06 RX ORDER — FAMOTIDINE 10 MG/ML
20 INJECTION, SOLUTION INTRAVENOUS ONCE AS NEEDED
OUTPATIENT
Start: 2025-08-07

## 2025-08-06 RX ORDER — ALBUTEROL SULFATE 0.83 MG/ML
3 SOLUTION RESPIRATORY (INHALATION) AS NEEDED
OUTPATIENT
Start: 2025-08-07

## 2025-08-06 ASSESSMENT — PAIN SCALES - GENERAL: PAINLEVEL_OUTOF10: 0-NO PAIN

## 2025-08-06 NOTE — PROGRESS NOTES
Patient arrived for blood transfusions, labs drawn 8/4 - hemoglobin 6.7. Patient received one unit PRBC as ordered, tolerated transfusion without incident. Patient is scheduled for iron infusion on 8/12 - per [facility] CNP, patient is to have repeat CBC drawn that day. [facility] notified that there are currently no orders in for CBC. Gave and reviewed labwork and treatment schedule with patient who verbalized understanding. Denys Denton denied any questions or concerns upon discharge.

## 2025-08-08 ENCOUNTER — HOSPITAL ENCOUNTER (OUTPATIENT)
Dept: RADIOLOGY | Facility: HOSPITAL | Age: 70
Discharge: HOME | End: 2025-08-08
Payer: MEDICARE

## 2025-08-08 DIAGNOSIS — R59.1 LYMPHADENOPATHY: ICD-10-CM

## 2025-08-08 LAB
ELECTRONICALLY SIGNED BY: NORMAL
MYELOID NGS RESULTS: NORMAL

## 2025-08-08 PROCEDURE — 76536 US EXAM OF HEAD AND NECK: CPT

## 2025-08-09 LAB — PYRIDOXAL PHOS SERPL-SCNC: 17 NMOL/L (ref 20–125)

## 2025-08-11 LAB
ALBUMIN: 3.9 G/DL (ref 3.4–5)
ALPHA 1 GLOBULIN: 0.4 G/DL (ref 0.2–0.6)
ALPHA 2 GLOBULIN: 0.8 G/DL (ref 0.4–1.1)
BETA GLOBULIN: 0.8 G/DL (ref 0.5–1.2)
GAMMA GLOBULIN: 2.7 G/DL (ref 0.5–1.4)
IMMUNOFIXATION COMMENT: ABNORMAL
PATH REVIEW - SERUM IMMUNOFIXATION: ABNORMAL
PATH REVIEW-SERUM PROTEIN ELECTROPHORESIS: ABNORMAL
PROTEIN ELECTROPHORESIS COMMENT: ABNORMAL

## 2025-08-12 ENCOUNTER — INFUSION (OUTPATIENT)
Dept: HEMATOLOGY/ONCOLOGY | Facility: CLINIC | Age: 70
End: 2025-08-12
Payer: MEDICARE

## 2025-08-12 VITALS
WEIGHT: 124 LBS | HEART RATE: 50 BPM | TEMPERATURE: 97 F | RESPIRATION RATE: 18 BRPM | OXYGEN SATURATION: 98 % | BODY MASS INDEX: 21.22 KG/M2 | SYSTOLIC BLOOD PRESSURE: 147 MMHG | DIASTOLIC BLOOD PRESSURE: 65 MMHG

## 2025-08-12 DIAGNOSIS — D64.9 ANEMIA, UNSPECIFIED TYPE: Primary | ICD-10-CM

## 2025-08-12 DIAGNOSIS — K90.49 MALABSORPTION DUE TO INTOLERANCE, NOT ELSEWHERE CLASSIFIED: ICD-10-CM

## 2025-08-12 DIAGNOSIS — D50.0 IRON DEFICIENCY ANEMIA DUE TO CHRONIC BLOOD LOSS: ICD-10-CM

## 2025-08-12 DIAGNOSIS — D64.9 ANEMIA, UNSPECIFIED TYPE: ICD-10-CM

## 2025-08-12 LAB
BASOPHILS # BLD AUTO: 0.05 X10*3/UL (ref 0–0.1)
BASOPHILS NFR BLD AUTO: 1.2 %
EOSINOPHIL # BLD AUTO: 0.2 X10*3/UL (ref 0–0.4)
EOSINOPHIL NFR BLD AUTO: 4.8 %
ERYTHROCYTE [DISTWIDTH] IN BLOOD BY AUTOMATED COUNT: 19.4 % (ref 11.5–14.5)
HCT VFR BLD AUTO: 25.4 % (ref 41–52)
HGB BLD-MCNC: 7.8 G/DL (ref 13.5–17.5)
IMM GRANULOCYTES # BLD AUTO: 0.01 X10*3/UL (ref 0–0.5)
IMM GRANULOCYTES NFR BLD AUTO: 0.2 % (ref 0–0.9)
LYMPHOCYTES # BLD AUTO: 0.7 X10*3/UL (ref 0.8–3)
LYMPHOCYTES NFR BLD AUTO: 16.7 %
MCH RBC QN AUTO: 25.4 PG (ref 26–34)
MCHC RBC AUTO-ENTMCNC: 30.7 G/DL (ref 32–36)
MCV RBC AUTO: 83 FL (ref 80–100)
MONOCYTES # BLD AUTO: 0.35 X10*3/UL (ref 0.05–0.8)
MONOCYTES NFR BLD AUTO: 8.4 %
NEUTROPHILS # BLD AUTO: 2.87 X10*3/UL (ref 1.6–5.5)
NEUTROPHILS NFR BLD AUTO: 68.7 %
NRBC BLD-RTO: ABNORMAL /100{WBCS}
PLATELET # BLD AUTO: 267 X10*3/UL (ref 150–450)
RBC # BLD AUTO: 3.07 X10*6/UL (ref 4.5–5.9)
WBC # BLD AUTO: 4.2 X10*3/UL (ref 4.4–11.3)

## 2025-08-12 PROCEDURE — 2500000004 HC RX 250 GENERAL PHARMACY W/ HCPCS (ALT 636 FOR OP/ED)

## 2025-08-12 PROCEDURE — 96365 THER/PROPH/DIAG IV INF INIT: CPT | Mod: INF

## 2025-08-12 PROCEDURE — 85025 COMPLETE CBC W/AUTO DIFF WBC: CPT

## 2025-08-12 RX ORDER — HEPARIN 100 UNIT/ML
500 SYRINGE INTRAVENOUS AS NEEDED
Status: CANCELLED | OUTPATIENT
Start: 2025-08-12

## 2025-08-12 RX ORDER — DIPHENHYDRAMINE HYDROCHLORIDE 50 MG/ML
50 INJECTION, SOLUTION INTRAMUSCULAR; INTRAVENOUS AS NEEDED
Status: CANCELLED | OUTPATIENT
Start: 2025-08-16

## 2025-08-12 RX ORDER — FAMOTIDINE 10 MG/ML
20 INJECTION, SOLUTION INTRAVENOUS ONCE AS NEEDED
Status: DISCONTINUED | OUTPATIENT
Start: 2025-08-12 | End: 2025-08-12 | Stop reason: HOSPADM

## 2025-08-12 RX ORDER — EPINEPHRINE 0.3 MG/.3ML
0.3 INJECTION SUBCUTANEOUS EVERY 5 MIN PRN
Status: CANCELLED | OUTPATIENT
Start: 2025-08-16

## 2025-08-12 RX ORDER — FAMOTIDINE 10 MG/ML
20 INJECTION, SOLUTION INTRAVENOUS ONCE AS NEEDED
Status: CANCELLED | OUTPATIENT
Start: 2025-08-16

## 2025-08-12 RX ORDER — ALBUTEROL SULFATE 0.83 MG/ML
3 SOLUTION RESPIRATORY (INHALATION) AS NEEDED
Status: DISCONTINUED | OUTPATIENT
Start: 2025-08-12 | End: 2025-08-12 | Stop reason: HOSPADM

## 2025-08-12 RX ORDER — DIPHENHYDRAMINE HYDROCHLORIDE 50 MG/ML
50 INJECTION, SOLUTION INTRAMUSCULAR; INTRAVENOUS AS NEEDED
Status: DISCONTINUED | OUTPATIENT
Start: 2025-08-12 | End: 2025-08-12 | Stop reason: HOSPADM

## 2025-08-12 RX ORDER — ALBUTEROL SULFATE 0.83 MG/ML
3 SOLUTION RESPIRATORY (INHALATION) AS NEEDED
Status: CANCELLED | OUTPATIENT
Start: 2025-08-16

## 2025-08-12 RX ORDER — HEPARIN SODIUM,PORCINE/PF 10 UNIT/ML
50 SYRINGE (ML) INTRAVENOUS AS NEEDED
Status: CANCELLED | OUTPATIENT
Start: 2025-08-12

## 2025-08-12 RX ORDER — EPINEPHRINE 0.3 MG/.3ML
0.3 INJECTION SUBCUTANEOUS EVERY 5 MIN PRN
Status: DISCONTINUED | OUTPATIENT
Start: 2025-08-12 | End: 2025-08-12 | Stop reason: HOSPADM

## 2025-08-12 RX ADMIN — IRON SUCROSE 300 MG: 20 INJECTION, SOLUTION INTRAVENOUS at 09:23

## 2025-08-12 ASSESSMENT — PAIN SCALES - GENERAL: PAINLEVEL_OUTOF10: 0-NO PAIN

## 2025-08-15 ENCOUNTER — INFUSION (OUTPATIENT)
Dept: HEMATOLOGY/ONCOLOGY | Facility: CLINIC | Age: 70
End: 2025-08-15
Payer: MEDICARE

## 2025-08-15 ENCOUNTER — OFFICE VISIT (OUTPATIENT)
Dept: GASTROENTEROLOGY | Facility: CLINIC | Age: 70
End: 2025-08-15
Payer: MEDICARE

## 2025-08-15 VITALS
RESPIRATION RATE: 18 BRPM | SYSTOLIC BLOOD PRESSURE: 149 MMHG | HEART RATE: 53 BPM | TEMPERATURE: 96.3 F | BODY MASS INDEX: 21.4 KG/M2 | DIASTOLIC BLOOD PRESSURE: 77 MMHG | WEIGHT: 125 LBS | OXYGEN SATURATION: 94 %

## 2025-08-15 VITALS
WEIGHT: 124 LBS | SYSTOLIC BLOOD PRESSURE: 161 MMHG | HEIGHT: 64 IN | DIASTOLIC BLOOD PRESSURE: 69 MMHG | HEART RATE: 55 BPM | TEMPERATURE: 98.2 F | BODY MASS INDEX: 21.17 KG/M2

## 2025-08-15 DIAGNOSIS — D50.0 IRON DEFICIENCY ANEMIA DUE TO CHRONIC BLOOD LOSS: ICD-10-CM

## 2025-08-15 DIAGNOSIS — K90.49 MALABSORPTION DUE TO INTOLERANCE, NOT ELSEWHERE CLASSIFIED: ICD-10-CM

## 2025-08-15 DIAGNOSIS — D64.9 ANEMIA, UNSPECIFIED TYPE: ICD-10-CM

## 2025-08-15 DIAGNOSIS — K55.20 ARTERIOVENOUS MALFORMATION OF SMALL INTESTINE: Primary | ICD-10-CM

## 2025-08-15 DIAGNOSIS — D62 ACUTE ON CHRONIC BLOOD LOSS ANEMIA: ICD-10-CM

## 2025-08-15 PROCEDURE — 3008F BODY MASS INDEX DOCD: CPT | Performed by: INTERNAL MEDICINE

## 2025-08-15 PROCEDURE — 99202 OFFICE O/P NEW SF 15 MIN: CPT

## 2025-08-15 PROCEDURE — 1159F MED LIST DOCD IN RCRD: CPT | Performed by: INTERNAL MEDICINE

## 2025-08-15 PROCEDURE — 96365 THER/PROPH/DIAG IV INF INIT: CPT | Mod: INF

## 2025-08-15 PROCEDURE — 2500000004 HC RX 250 GENERAL PHARMACY W/ HCPCS (ALT 636 FOR OP/ED)

## 2025-08-15 PROCEDURE — 99204 OFFICE O/P NEW MOD 45 MIN: CPT | Performed by: INTERNAL MEDICINE

## 2025-08-15 RX ORDER — FAMOTIDINE 10 MG/ML
20 INJECTION, SOLUTION INTRAVENOUS ONCE AS NEEDED
OUTPATIENT
Start: 2025-08-16

## 2025-08-15 RX ORDER — EPINEPHRINE 0.3 MG/.3ML
0.3 INJECTION SUBCUTANEOUS EVERY 5 MIN PRN
Status: DISCONTINUED | OUTPATIENT
Start: 2025-08-15 | End: 2025-08-15 | Stop reason: HOSPADM

## 2025-08-15 RX ORDER — EPINEPHRINE 0.3 MG/.3ML
0.3 INJECTION SUBCUTANEOUS EVERY 5 MIN PRN
OUTPATIENT
Start: 2025-08-16

## 2025-08-15 RX ORDER — HEPARIN 100 UNIT/ML
500 SYRINGE INTRAVENOUS AS NEEDED
OUTPATIENT
Start: 2025-08-15

## 2025-08-15 RX ORDER — POLYETHYLENE GLYCOL 3350, SODIUM SULFATE ANHYDROUS, SODIUM BICARBONATE, SODIUM CHLORIDE, POTASSIUM CHLORIDE 236; 22.74; 6.74; 5.86; 2.97 G/4L; G/4L; G/4L; G/4L; G/4L
4000 POWDER, FOR SOLUTION ORAL ONCE
Qty: 4000 ML | Refills: 0 | Status: SHIPPED | OUTPATIENT
Start: 2025-08-15 | End: 2025-08-15

## 2025-08-15 RX ORDER — DIPHENHYDRAMINE HYDROCHLORIDE 50 MG/ML
50 INJECTION, SOLUTION INTRAMUSCULAR; INTRAVENOUS AS NEEDED
OUTPATIENT
Start: 2025-08-16

## 2025-08-15 RX ORDER — HEPARIN SODIUM,PORCINE/PF 10 UNIT/ML
50 SYRINGE (ML) INTRAVENOUS AS NEEDED
OUTPATIENT
Start: 2025-08-15

## 2025-08-15 RX ORDER — FAMOTIDINE 10 MG/ML
20 INJECTION, SOLUTION INTRAVENOUS ONCE AS NEEDED
Status: DISCONTINUED | OUTPATIENT
Start: 2025-08-15 | End: 2025-08-15 | Stop reason: HOSPADM

## 2025-08-15 RX ORDER — ALBUTEROL SULFATE 0.83 MG/ML
3 SOLUTION RESPIRATORY (INHALATION) AS NEEDED
OUTPATIENT
Start: 2025-08-16

## 2025-08-15 RX ORDER — DIPHENHYDRAMINE HYDROCHLORIDE 50 MG/ML
50 INJECTION, SOLUTION INTRAMUSCULAR; INTRAVENOUS AS NEEDED
Status: DISCONTINUED | OUTPATIENT
Start: 2025-08-15 | End: 2025-08-15 | Stop reason: HOSPADM

## 2025-08-15 RX ORDER — ALBUTEROL SULFATE 0.83 MG/ML
3 SOLUTION RESPIRATORY (INHALATION) AS NEEDED
Status: DISCONTINUED | OUTPATIENT
Start: 2025-08-15 | End: 2025-08-15 | Stop reason: HOSPADM

## 2025-08-15 RX ADMIN — IRON SUCROSE 300 MG: 20 INJECTION, SOLUTION INTRAVENOUS at 13:29

## 2025-08-15 ASSESSMENT — PAIN SCALES - GENERAL: PAINLEVEL_OUTOF10: 0-NO PAIN

## 2025-08-18 ENCOUNTER — TELEPHONE (OUTPATIENT)
Dept: GASTROENTEROLOGY | Facility: CLINIC | Age: 70
End: 2025-08-18

## 2025-08-18 ENCOUNTER — APPOINTMENT (OUTPATIENT)
Dept: GASTROENTEROLOGY | Facility: CLINIC | Age: 70
End: 2025-08-18
Payer: MEDICARE

## 2025-08-19 ENCOUNTER — INFUSION (OUTPATIENT)
Dept: HEMATOLOGY/ONCOLOGY | Facility: CLINIC | Age: 70
End: 2025-08-19
Payer: MEDICARE

## 2025-08-19 VITALS
SYSTOLIC BLOOD PRESSURE: 167 MMHG | HEART RATE: 70 BPM | DIASTOLIC BLOOD PRESSURE: 79 MMHG | BODY MASS INDEX: 21.33 KG/M2 | TEMPERATURE: 97 F | RESPIRATION RATE: 16 BRPM | WEIGHT: 124.6 LBS | OXYGEN SATURATION: 97 %

## 2025-08-19 DIAGNOSIS — D50.0 IRON DEFICIENCY ANEMIA DUE TO CHRONIC BLOOD LOSS: ICD-10-CM

## 2025-08-19 DIAGNOSIS — K90.49 MALABSORPTION DUE TO INTOLERANCE, NOT ELSEWHERE CLASSIFIED: ICD-10-CM

## 2025-08-19 DIAGNOSIS — D64.9 ANEMIA, UNSPECIFIED TYPE: ICD-10-CM

## 2025-08-19 PROCEDURE — 2500000004 HC RX 250 GENERAL PHARMACY W/ HCPCS (ALT 636 FOR OP/ED)

## 2025-08-19 PROCEDURE — 96365 THER/PROPH/DIAG IV INF INIT: CPT | Mod: INF

## 2025-08-19 RX ORDER — FAMOTIDINE 10 MG/ML
20 INJECTION, SOLUTION INTRAVENOUS ONCE AS NEEDED
OUTPATIENT
Start: 2025-08-19

## 2025-08-19 RX ORDER — HEPARIN SODIUM,PORCINE/PF 10 UNIT/ML
50 SYRINGE (ML) INTRAVENOUS AS NEEDED
OUTPATIENT
Start: 2025-08-19

## 2025-08-19 RX ORDER — DIPHENHYDRAMINE HYDROCHLORIDE 50 MG/ML
50 INJECTION, SOLUTION INTRAMUSCULAR; INTRAVENOUS AS NEEDED
Status: DISCONTINUED | OUTPATIENT
Start: 2025-08-19 | End: 2025-08-19 | Stop reason: HOSPADM

## 2025-08-19 RX ORDER — DIPHENHYDRAMINE HYDROCHLORIDE 50 MG/ML
50 INJECTION, SOLUTION INTRAMUSCULAR; INTRAVENOUS AS NEEDED
OUTPATIENT
Start: 2025-08-19

## 2025-08-19 RX ORDER — EPINEPHRINE 0.3 MG/.3ML
0.3 INJECTION SUBCUTANEOUS EVERY 5 MIN PRN
OUTPATIENT
Start: 2025-08-19

## 2025-08-19 RX ORDER — HEPARIN 100 UNIT/ML
500 SYRINGE INTRAVENOUS AS NEEDED
OUTPATIENT
Start: 2025-08-19

## 2025-08-19 RX ORDER — ALBUTEROL SULFATE 0.83 MG/ML
3 SOLUTION RESPIRATORY (INHALATION) AS NEEDED
OUTPATIENT
Start: 2025-08-19

## 2025-08-19 RX ORDER — EPINEPHRINE 0.3 MG/.3ML
0.3 INJECTION SUBCUTANEOUS EVERY 5 MIN PRN
Status: DISCONTINUED | OUTPATIENT
Start: 2025-08-19 | End: 2025-08-19 | Stop reason: HOSPADM

## 2025-08-19 RX ORDER — ALBUTEROL SULFATE 0.83 MG/ML
3 SOLUTION RESPIRATORY (INHALATION) AS NEEDED
Status: DISCONTINUED | OUTPATIENT
Start: 2025-08-19 | End: 2025-08-19 | Stop reason: HOSPADM

## 2025-08-19 RX ORDER — FAMOTIDINE 10 MG/ML
20 INJECTION, SOLUTION INTRAVENOUS ONCE AS NEEDED
Status: DISCONTINUED | OUTPATIENT
Start: 2025-08-19 | End: 2025-08-19 | Stop reason: HOSPADM

## 2025-08-19 RX ADMIN — IRON SUCROSE 300 MG: 20 INJECTION, SOLUTION INTRAVENOUS at 09:28

## 2025-08-19 ASSESSMENT — PAIN SCALES - GENERAL: PAINLEVEL_OUTOF10: 0-NO PAIN

## 2025-11-14 ENCOUNTER — APPOINTMENT (OUTPATIENT)
Dept: GENETICS | Facility: CLINIC | Age: 70
End: 2025-11-14
Payer: MEDICARE